# Patient Record
Sex: FEMALE | Race: WHITE | NOT HISPANIC OR LATINO | Employment: OTHER | URBAN - METROPOLITAN AREA
[De-identification: names, ages, dates, MRNs, and addresses within clinical notes are randomized per-mention and may not be internally consistent; named-entity substitution may affect disease eponyms.]

---

## 2017-04-18 ENCOUNTER — TELEPHONE (OUTPATIENT)
Dept: ORTHOPEDIC SURGERY | Facility: CLINIC | Age: 57
End: 2017-04-18

## 2017-05-24 ENCOUNTER — OFFICE VISIT (OUTPATIENT)
Dept: ORTHOPEDIC SURGERY | Facility: CLINIC | Age: 57
End: 2017-05-24

## 2017-05-24 VITALS — HEIGHT: 63 IN | TEMPERATURE: 97.6 F | WEIGHT: 190 LBS | BODY MASS INDEX: 33.66 KG/M2

## 2017-05-24 DIAGNOSIS — M25.512 ACUTE PAIN OF LEFT SHOULDER: ICD-10-CM

## 2017-05-24 DIAGNOSIS — Z96.651 HISTORY OF TOTAL KNEE ARTHROPLASTY, RIGHT: Primary | ICD-10-CM

## 2017-05-24 PROBLEM — Z96.659 HISTORY OF TOTAL KNEE ARTHROPLASTY: Status: ACTIVE | Noted: 2017-05-24

## 2017-05-24 PROCEDURE — 99212 OFFICE O/P EST SF 10 MIN: CPT | Performed by: ORTHOPAEDIC SURGERY

## 2017-05-24 PROCEDURE — 73560 X-RAY EXAM OF KNEE 1 OR 2: CPT | Performed by: ORTHOPAEDIC SURGERY

## 2017-08-10 ENCOUNTER — HOSPITAL ENCOUNTER (OUTPATIENT)
Dept: FAMILY MEDICINE CLINIC | Facility: CLINIC | Age: 57
Setting detail: SPECIMEN
Discharge: HOME | End: 2017-08-10
Attending: FAMILY MEDICINE | Admitting: FAMILY MEDICINE

## 2017-08-10 LAB
ALBUMIN SERPL-MCNC: 4.1 G/DL (ref 3.5–4.8)
ALBUMIN/GLOB SERPL: 1.5 {RATIO} (ref 1–1.7)
ALP SERPL-CCNC: 64 IU/L (ref 32–91)
ALT SERPL-CCNC: 17 IU/L (ref 14–54)
ANION GAP SERPL CALC-SCNC: 13.1 MMOL/L (ref 10–20)
AST SERPL-CCNC: 19 IU/L (ref 15–41)
BASOPHILS # BLD AUTO: 0 10*3/UL (ref 0–0.2)
BASOPHILS NFR BLD AUTO: 0 % (ref 0–2)
BILIRUB SERPL-MCNC: 0.8 MG/DL (ref 0.3–1.2)
BUN SERPL-MCNC: 13 MG/DL (ref 8–20)
BUN/CREAT SERPL: 13 (ref 5.4–26.2)
CALCIUM SERPL-MCNC: 9.2 MG/DL (ref 8.9–10.3)
CHLORIDE SERPL-SCNC: 102 MMOL/L (ref 101–111)
CHOLEST SERPL-MCNC: 212 MG/DL
CHOLEST/HDLC SERPL: 3.5 {RATIO}
CONV CO2: 31 MMOL/L (ref 22–32)
CONV LDL CHOLESTEROL DIRECT: 137 MG/DL (ref 0–100)
CONV TOTAL PROTEIN: 6.8 G/DL (ref 6.1–7.9)
CREAT UR-MCNC: 1 MG/DL (ref 0.4–1)
DIFFERENTIAL METHOD BLD: (no result)
EOSINOPHIL # BLD AUTO: 0.2 10*3/UL (ref 0–0.3)
EOSINOPHIL # BLD AUTO: 3 % (ref 0–3)
ERYTHROCYTE [DISTWIDTH] IN BLOOD BY AUTOMATED COUNT: 14 % (ref 11.5–14.5)
GLOBULIN UR ELPH-MCNC: 2.7 G/DL (ref 2.5–3.8)
GLUCOSE SERPL-MCNC: 89 MG/DL (ref 65–99)
HCT VFR BLD AUTO: 40.8 % (ref 35–49)
HDLC SERPL-MCNC: 61 MG/DL
HGB BLD-MCNC: 13.6 G/DL (ref 12–15)
LDLC/HDLC SERPL: 2.2 {RATIO}
LIPID INTERPRETATION: ABNORMAL
LYMPHOCYTES # BLD AUTO: 1.3 10*3/UL (ref 0.8–4.8)
LYMPHOCYTES NFR BLD AUTO: 24 % (ref 18–42)
MCH RBC QN AUTO: 30.8 PG (ref 26–32)
MCHC RBC AUTO-ENTMCNC: 33.4 G/DL (ref 32–36)
MCV RBC AUTO: 92.4 FL (ref 80–94)
MONOCYTES # BLD AUTO: 0.5 10*3/UL (ref 0.1–1.3)
MONOCYTES NFR BLD AUTO: 8 % (ref 2–11)
NEUTROPHILS # BLD AUTO: 3.5 10*3/UL (ref 2.3–8.6)
NEUTROPHILS NFR BLD AUTO: 65 % (ref 50–75)
NRBC BLD AUTO-RTO: 0 /100{WBCS}
NRBC/RBC NFR BLD MANUAL: 0 10*3/UL
PLATELET # BLD AUTO: 273 10*3/UL (ref 150–450)
PMV BLD AUTO: 7.8 FL (ref 7.4–10.4)
POTASSIUM SERPL-SCNC: 4.1 MMOL/L (ref 3.6–5.1)
RBC # BLD AUTO: 4.42 10*6/UL (ref 4–5.4)
SODIUM SERPL-SCNC: 142 MMOL/L (ref 136–144)
TRIGL SERPL-MCNC: 150 MG/DL
URATE SERPL-MCNC: 6.3 MG/DL (ref 2.6–8)
VLDLC SERPL CALC-MCNC: 13.3 MG/DL
WBC # BLD AUTO: 5.5 10*3/UL (ref 4.5–11.5)

## 2017-10-18 ENCOUNTER — TELEPHONE (OUTPATIENT)
Dept: ORTHOPEDIC SURGERY | Facility: CLINIC | Age: 57
End: 2017-10-18

## 2017-10-18 RX ORDER — AMOXICILLIN 500 MG/1
CAPSULE ORAL
Qty: 20 CAPSULE | Refills: 1 | Status: SHIPPED | OUTPATIENT
Start: 2017-10-18 | End: 2020-04-15

## 2018-02-07 ENCOUNTER — HOSPITAL ENCOUNTER (OUTPATIENT)
Dept: FAMILY MEDICINE CLINIC | Facility: CLINIC | Age: 58
Setting detail: SPECIMEN
Discharge: HOME | End: 2018-02-07
Attending: FAMILY MEDICINE | Admitting: FAMILY MEDICINE

## 2018-02-07 LAB
ALBUMIN SERPL-MCNC: 4 G/DL (ref 3.5–4.8)
ALBUMIN/GLOB SERPL: 1.2 {RATIO} (ref 1–1.7)
ALP SERPL-CCNC: 67 IU/L (ref 32–91)
ALT SERPL-CCNC: 18 IU/L (ref 14–54)
ANION GAP SERPL CALC-SCNC: 12.8 MMOL/L (ref 10–20)
AST SERPL-CCNC: 19 IU/L (ref 15–41)
BASOPHILS # BLD AUTO: 0 10*3/UL (ref 0–0.2)
BASOPHILS NFR BLD AUTO: 0 % (ref 0–2)
BILIRUB SERPL-MCNC: 0.8 MG/DL (ref 0.3–1.2)
BUN SERPL-MCNC: 16 MG/DL (ref 8–20)
BUN/CREAT SERPL: 16 (ref 5.4–26.2)
CALCIUM SERPL-MCNC: 9.3 MG/DL (ref 8.9–10.3)
CHLORIDE SERPL-SCNC: 100 MMOL/L (ref 101–111)
CHOLEST SERPL-MCNC: 228 MG/DL
CHOLEST/HDLC SERPL: 3.7 {RATIO}
CONV CO2: 30 MMOL/L (ref 22–32)
CONV LDL CHOLESTEROL DIRECT: 152 MG/DL (ref 0–100)
CONV TOTAL PROTEIN: 7.3 G/DL (ref 6.1–7.9)
CREAT UR-MCNC: 1 MG/DL (ref 0.4–1)
DIFFERENTIAL METHOD BLD: (no result)
EOSINOPHIL # BLD AUTO: 0.2 10*3/UL (ref 0–0.3)
EOSINOPHIL # BLD AUTO: 3 % (ref 0–3)
ERYTHROCYTE [DISTWIDTH] IN BLOOD BY AUTOMATED COUNT: 13.9 % (ref 11.5–14.5)
GLOBULIN UR ELPH-MCNC: 3.3 G/DL (ref 2.5–3.8)
GLUCOSE SERPL-MCNC: 93 MG/DL (ref 65–99)
HCT VFR BLD AUTO: 41.3 % (ref 35–49)
HDLC SERPL-MCNC: 62 MG/DL
HGB BLD-MCNC: 13.8 G/DL (ref 12–15)
LDLC/HDLC SERPL: 2.5 {RATIO}
LIPID INTERPRETATION: ABNORMAL
LYMPHOCYTES # BLD AUTO: 1.5 10*3/UL (ref 0.8–4.8)
LYMPHOCYTES NFR BLD AUTO: 30 % (ref 18–42)
MCH RBC QN AUTO: 30.9 PG (ref 26–32)
MCHC RBC AUTO-ENTMCNC: 33.4 G/DL (ref 32–36)
MCV RBC AUTO: 92.7 FL (ref 80–94)
MONOCYTES # BLD AUTO: 0.4 10*3/UL (ref 0.1–1.3)
MONOCYTES NFR BLD AUTO: 8 % (ref 2–11)
NEUTROPHILS # BLD AUTO: 2.9 10*3/UL (ref 2.3–8.6)
NEUTROPHILS NFR BLD AUTO: 59 % (ref 50–75)
NRBC BLD AUTO-RTO: 0 /100{WBCS}
NRBC/RBC NFR BLD MANUAL: 0 10*3/UL
PLATELET # BLD AUTO: 297 10*3/UL (ref 150–450)
PMV BLD AUTO: 7.8 FL (ref 7.4–10.4)
POTASSIUM SERPL-SCNC: 3.8 MMOL/L (ref 3.6–5.1)
RBC # BLD AUTO: 4.46 10*6/UL (ref 4–5.4)
SODIUM SERPL-SCNC: 139 MMOL/L (ref 136–144)
TRIGL SERPL-MCNC: 136 MG/DL
VLDLC SERPL CALC-MCNC: 13.8 MG/DL
WBC # BLD AUTO: 4.9 10*3/UL (ref 4.5–11.5)

## 2018-08-09 ENCOUNTER — HOSPITAL ENCOUNTER (OUTPATIENT)
Dept: MAMMOGRAPHY | Facility: HOSPITAL | Age: 58
Discharge: HOME | End: 2018-08-09
Attending: FAMILY MEDICINE | Admitting: FAMILY MEDICINE

## 2019-02-05 ENCOUNTER — HOSPITAL ENCOUNTER (OUTPATIENT)
Dept: FAMILY MEDICINE CLINIC | Facility: CLINIC | Age: 59
Setting detail: SPECIMEN
Discharge: HOME | End: 2019-02-05
Attending: FAMILY MEDICINE | Admitting: FAMILY MEDICINE

## 2019-02-05 LAB
ALBUMIN SERPL-MCNC: 4 G/DL (ref 3.5–4.8)
ALBUMIN/GLOB SERPL: 1.2 {RATIO} (ref 1–1.7)
ALP SERPL-CCNC: 68 IU/L (ref 32–91)
ALT SERPL-CCNC: 16 IU/L (ref 14–54)
ANION GAP SERPL CALC-SCNC: 12.3 MMOL/L (ref 10–20)
AST SERPL-CCNC: 19 IU/L (ref 15–41)
BILIRUB SERPL-MCNC: 0.5 MG/DL (ref 0.3–1.2)
BUN SERPL-MCNC: 12 MG/DL (ref 8–20)
BUN/CREAT SERPL: 13.3 (ref 5.4–26.2)
CALCIUM SERPL-MCNC: 9.3 MG/DL (ref 8.9–10.3)
CHLORIDE SERPL-SCNC: 100 MMOL/L (ref 101–111)
CHOLEST SERPL-MCNC: 233 MG/DL
CHOLEST/HDLC SERPL: 3.5 {RATIO}
CONV CO2: 30 MMOL/L (ref 22–32)
CONV LDL CHOLESTEROL DIRECT: 149 MG/DL (ref 0–100)
CONV TOTAL PROTEIN: 7.3 G/DL (ref 6.1–7.9)
CREAT UR-MCNC: 0.9 MG/DL (ref 0.4–1)
GLOBULIN UR ELPH-MCNC: 3.3 G/DL (ref 2.5–3.8)
GLUCOSE SERPL-MCNC: 86 MG/DL (ref 65–99)
HDLC SERPL-MCNC: 66 MG/DL
LDLC/HDLC SERPL: 2.3 {RATIO}
LIPID INTERPRETATION: ABNORMAL
POTASSIUM SERPL-SCNC: 3.3 MMOL/L (ref 3.6–5.1)
SODIUM SERPL-SCNC: 139 MMOL/L (ref 136–144)
TRIGL SERPL-MCNC: 134 MG/DL
VLDLC SERPL CALC-MCNC: 18.2 MG/DL

## 2019-10-04 RX ORDER — PRAVASTATIN SODIUM 80 MG/1
TABLET ORAL
Qty: 90 TABLET | Refills: 0 | Status: SHIPPED | OUTPATIENT
Start: 2019-10-04 | End: 2019-12-23

## 2019-10-04 RX ORDER — POTASSIUM CHLORIDE 750 MG/1
CAPSULE, EXTENDED RELEASE ORAL
Qty: 90 CAPSULE | Refills: 0 | Status: SHIPPED | OUTPATIENT
Start: 2019-10-04 | End: 2020-02-23

## 2019-10-04 RX ORDER — ACYCLOVIR 800 MG/1
TABLET ORAL
Qty: 90 TABLET | Refills: 0 | Status: SHIPPED | OUTPATIENT
Start: 2019-10-04 | End: 2019-12-23

## 2019-12-23 RX ORDER — PRAVASTATIN SODIUM 80 MG/1
TABLET ORAL
Qty: 30 TABLET | Refills: 0 | Status: SHIPPED | OUTPATIENT
Start: 2019-12-23 | End: 2020-01-23

## 2019-12-23 RX ORDER — ACYCLOVIR 800 MG/1
TABLET ORAL
Qty: 30 TABLET | Refills: 0 | Status: SHIPPED | OUTPATIENT
Start: 2019-12-23 | End: 2020-01-23

## 2020-01-23 RX ORDER — PRAVASTATIN SODIUM 80 MG/1
80 TABLET ORAL
Qty: 30 TABLET | Refills: 0 | Status: SHIPPED | OUTPATIENT
Start: 2020-01-23 | End: 2020-02-23

## 2020-01-23 RX ORDER — METOPROLOL SUCCINATE 50 MG/1
50 TABLET, EXTENDED RELEASE ORAL DAILY
Qty: 30 TABLET | Refills: 0 | Status: SHIPPED | OUTPATIENT
Start: 2020-01-23 | End: 2020-02-23

## 2020-01-23 RX ORDER — ACYCLOVIR 800 MG/1
800 TABLET ORAL DAILY
Qty: 30 TABLET | Refills: 0 | Status: SHIPPED | OUTPATIENT
Start: 2020-01-23 | End: 2020-02-23

## 2020-02-23 RX ORDER — METOPROLOL SUCCINATE 50 MG/1
TABLET, EXTENDED RELEASE ORAL
Qty: 30 TABLET | Refills: 0 | Status: SHIPPED | OUTPATIENT
Start: 2020-02-23 | End: 2020-04-07 | Stop reason: SDUPTHER

## 2020-02-23 RX ORDER — POTASSIUM CHLORIDE 750 MG/1
CAPSULE, EXTENDED RELEASE ORAL
Qty: 90 CAPSULE | Refills: 0 | Status: SHIPPED | OUTPATIENT
Start: 2020-02-23 | End: 2020-04-07 | Stop reason: SDUPTHER

## 2020-02-23 RX ORDER — CETIRIZINE HYDROCHLORIDE 10 MG/1
TABLET ORAL
Qty: 90 TABLET | Refills: 0 | Status: SHIPPED | OUTPATIENT
Start: 2020-02-23 | End: 2020-04-07 | Stop reason: SDUPTHER

## 2020-02-23 RX ORDER — TRIAMTERENE AND HYDROCHLOROTHIAZIDE 75; 50 MG/1; MG/1
TABLET ORAL
Qty: 90 TABLET | Refills: 0 | Status: SHIPPED | OUTPATIENT
Start: 2020-02-23 | End: 2020-04-07 | Stop reason: SDUPTHER

## 2020-02-23 RX ORDER — ERGOCALCIFEROL 1.25 MG/1
CAPSULE ORAL
Qty: 12 CAPSULE | Refills: 0 | Status: SHIPPED | OUTPATIENT
Start: 2020-02-23 | End: 2020-04-06 | Stop reason: SDUPTHER

## 2020-02-23 RX ORDER — ACYCLOVIR 800 MG/1
TABLET ORAL
Qty: 30 TABLET | Refills: 0 | Status: SHIPPED | OUTPATIENT
Start: 2020-02-23 | End: 2020-04-07 | Stop reason: SDUPTHER

## 2020-02-23 RX ORDER — PRAVASTATIN SODIUM 80 MG/1
TABLET ORAL
Qty: 90 TABLET | Refills: 0 | Status: SHIPPED | OUTPATIENT
Start: 2020-02-23 | End: 2020-04-07 | Stop reason: SDUPTHER

## 2020-04-06 RX ORDER — ERGOCALCIFEROL 1.25 MG/1
50000 CAPSULE ORAL WEEKLY
Qty: 12 CAPSULE | Refills: 0 | Status: SHIPPED | OUTPATIENT
Start: 2020-04-06 | End: 2021-02-09

## 2020-04-07 RX ORDER — POTASSIUM CHLORIDE 750 MG/1
10 CAPSULE, EXTENDED RELEASE ORAL DAILY
Qty: 90 CAPSULE | Refills: 0 | Status: SHIPPED | OUTPATIENT
Start: 2020-04-07 | End: 2021-03-09

## 2020-04-07 RX ORDER — METOPROLOL SUCCINATE 50 MG/1
50 TABLET, EXTENDED RELEASE ORAL DAILY
Qty: 30 TABLET | Refills: 0 | Status: SHIPPED | OUTPATIENT
Start: 2020-04-07 | End: 2021-03-11

## 2020-04-07 RX ORDER — ACYCLOVIR 800 MG/1
400 TABLET ORAL DAILY
Qty: 90 TABLET | Refills: 0 | Status: SHIPPED | OUTPATIENT
Start: 2020-04-07 | End: 2021-07-06

## 2020-04-07 RX ORDER — CETIRIZINE HYDROCHLORIDE 10 MG/1
10 TABLET ORAL DAILY
Qty: 90 TABLET | Refills: 0 | Status: SHIPPED | OUTPATIENT
Start: 2020-04-07 | End: 2021-06-09

## 2020-04-07 RX ORDER — PRAVASTATIN SODIUM 80 MG/1
80 TABLET ORAL
Qty: 90 TABLET | Refills: 0 | Status: SHIPPED | OUTPATIENT
Start: 2020-04-07 | End: 2021-12-09

## 2020-04-07 RX ORDER — TRIAMTERENE AND HYDROCHLOROTHIAZIDE 75; 50 MG/1; MG/1
1 TABLET ORAL DAILY
Qty: 90 TABLET | Refills: 0 | Status: SHIPPED | OUTPATIENT
Start: 2020-04-07 | End: 2021-07-29

## 2020-04-15 ENCOUNTER — OFFICE VISIT (OUTPATIENT)
Dept: FAMILY MEDICINE CLINIC | Facility: CLINIC | Age: 60
End: 2020-04-15

## 2020-04-15 VITALS
SYSTOLIC BLOOD PRESSURE: 141 MMHG | DIASTOLIC BLOOD PRESSURE: 80 MMHG | OXYGEN SATURATION: 98 % | WEIGHT: 201 LBS | TEMPERATURE: 98.2 F | HEART RATE: 60 BPM | BODY MASS INDEX: 34.31 KG/M2 | HEIGHT: 64 IN

## 2020-04-15 DIAGNOSIS — D64.9 ANEMIA, UNSPECIFIED TYPE: ICD-10-CM

## 2020-04-15 DIAGNOSIS — E78.2 MIXED HYPERLIPIDEMIA: Primary | ICD-10-CM

## 2020-04-15 DIAGNOSIS — L20.84 INTRINSIC ECZEMA: ICD-10-CM

## 2020-04-15 DIAGNOSIS — E55.9 VITAMIN D DEFICIENCY: ICD-10-CM

## 2020-04-15 DIAGNOSIS — I10 ESSENTIAL HYPERTENSION: ICD-10-CM

## 2020-04-15 PROBLEM — J30.9 ALLERGIC RHINITIS: Status: ACTIVE | Noted: 2018-02-07

## 2020-04-15 PROBLEM — N62 BREAST HYPERTROPHY: Status: ACTIVE | Noted: 2019-02-05

## 2020-04-15 PROBLEM — E78.5 HYPERLIPIDEMIA: Status: ACTIVE | Noted: 2017-08-10

## 2020-04-15 PROCEDURE — 99213 OFFICE O/P EST LOW 20 MIN: CPT | Performed by: FAMILY MEDICINE

## 2020-04-15 RX ORDER — CLOBETASOL PROPIONATE 0.5 MG/G
CREAM TOPICAL 2 TIMES DAILY
Qty: 30 G | Refills: 1 | Status: SHIPPED | OUTPATIENT
Start: 2020-04-15

## 2020-04-15 NOTE — PROGRESS NOTES
Subjective   Sarah Brannon is a 60 y.o. female.     Comes in for follow up on bp, chol and vit D def  C/O rash on her scalp  Has always used a medicated shampoo but it is getting worse  She has been using her sister's cream and it is working - clobetasol propionate 0.05%         The following portions of the patient's history were reviewed and updated as appropriate: allergies, current medications, past family history, past medical history, past social history, past surgical history and problem list.  Past Medical History:   Diagnosis Date   • Arthritis    • Baker's cyst of knee     RIGHT   • Depression    • Hiatal hernia    • History of palpitations    • Hyperlipemia    • Hypertension    • Migraine    • PONV (postoperative nausea and vomiting)     SLOW TO WAKE   • Seasonal allergies      Past Surgical History:   Procedure Laterality Date   • ENDOMETRIAL ABLATION      menustrual   • FOOT SURGERY      rt and lt foot   • LEEP     • MOLE REMOVAL     • NASAL SEPTUM SURGERY     • TOTAL KNEE ARTHROPLASTY Right 8/23/2016    Procedure: RT TOTAL KNEE ARTHROPLASTY WITH ROBERTO NAVIGATION, depuy;  Surgeon: Hakan Mccann MD;  Location: [facility];  Service:      Family History   Problem Relation Age of Onset   • Heart disease Mother    • Heart disease Father      Social History     Socioeconomic History   • Marital status:      Spouse name: Not on file   • Number of children: Not on file   • Years of education: Not on file   • Highest education level: Not on file   Tobacco Use   • Smoking status: Never Smoker   • Smokeless tobacco: Never Used   Substance and Sexual Activity   • Alcohol use: No   • Drug use: No   • Sexual activity: Defer         Current Outpatient Medications:   •  acyclovir (ZOVIRAX) 800 MG tablet, Take 0.5 tablets by mouth Daily., Disp: 90 tablet, Rfl: 0  •  buPROPion XL (WELLBUTRIN XL) 150 MG 24 hr tablet, Take 150 mg by mouth every morning., Disp: , Rfl:   •  buPROPion XL (WELLBUTRIN XL)  "300 MG 24 hr tablet, Take 300 mg by mouth every morning., Disp: , Rfl:   •  cetirizine (zyrTEC) 10 MG tablet, Take 1 tablet by mouth Daily., Disp: 90 tablet, Rfl: 0  •  metoprolol succinate XL (TOPROL-XL) 50 MG 24 hr tablet, Take 1 tablet by mouth Daily., Disp: 30 tablet, Rfl: 0  •  potassium chloride (MICRO-K) 10 MEQ CR capsule, Take 1 capsule by mouth Daily., Disp: 90 capsule, Rfl: 0  •  pravastatin (PRAVACHOL) 80 MG tablet, Take 1 tablet by mouth every night at bedtime., Disp: 90 tablet, Rfl: 0  •  triamterene-hydrochlorothiazide (MAXZIDE) 75-50 MG per tablet, Take 1 tablet by mouth Daily., Disp: 90 tablet, Rfl: 0  •  venlafaxine XR (EFFEXOR-XR) 150 MG 24 hr capsule, Take 300 mg by mouth daily., Disp: , Rfl:   •  vitamin D (ERGOCALCIFEROL) 1.25 MG (03567 UT) capsule capsule, Take 1 capsule by mouth 1 (One) Time Per Week., Disp: 12 capsule, Rfl: 0  •  clobetasol (TEMOVATE) 0.05 % cream, Apply  topically to the appropriate area as directed 2 (Two) Times a Day., Disp: 30 g, Rfl: 1    Review of Systems   Constitutional: Negative for diaphoresis, fatigue, fever, unexpected weight gain and unexpected weight loss.   Respiratory: Negative for cough, chest tightness and shortness of breath.    Cardiovascular: Negative for chest pain, palpitations and leg swelling.   Gastrointestinal: Negative for nausea and vomiting.   Skin: Positive for rash.   Neurological: Negative for dizziness, syncope and headache.   Psychiatric/Behavioral: Positive for stress. Negative for self-injury and suicidal ideas. The patient is nervous/anxious.      /80 (BP Location: Left arm, Patient Position: Sitting, Cuff Size: Adult)   Pulse 60   Temp 98.2 °F (36.8 °C) (Oral)   Ht 161.3 cm (63.5\")   Wt 91.2 kg (201 lb)   SpO2 98%   Breastfeeding No   BMI 35.05 kg/m²       Objective   Physical Exam   Constitutional: She appears well-developed and well-nourished. No distress. She is obese.  HENT:   Head: Normocephalic and atraumatic.   Neck: " Neck supple. No JVD present. No thyromegaly present.   Cardiovascular: Normal rate, regular rhythm, normal heart sounds and intact distal pulses. Exam reveals no gallop and no friction rub.   No murmur heard.  Pulmonary/Chest: Effort normal and breath sounds normal. No respiratory distress. She has no wheezes. She has no rales.   Musculoskeletal: She exhibits no edema.   Lymphadenopathy:     She has no cervical adenopathy.   Neurological: She is alert.   Skin: Skin is warm and dry.   Some scaling along the hairline   Nursing note and vitals reviewed.        Assessment/Plan   Problems Addressed this Visit        Cardiovascular and Mediastinum    Hyperlipidemia - Primary    Relevant Orders    Comprehensive Metabolic Panel    Lipid Panel    Hypertension    Relevant Orders    Comprehensive Metabolic Panel    Lipid Panel       Digestive    Vitamin D deficiency    Relevant Orders    Vitamin D 25 Hydroxy       Musculoskeletal and Integument    Intrinsic eczema    Relevant Medications    clobetasol (TEMOVATE) 0.05 % cream       Hematopoietic and Hemostatic    Anemia    Relevant Orders    CBC & Differential        She was counseled on the need for weight loss  She will continue her current medications  Gave her a prescription for some clobetasol propionate to help with the eczema  This may be some mild psoriasis  She was encouraged to work on stress reduction to help deal with the current pandemic  She did let me know that she has plans to have a breast reduction later this year once elective surgeries are allowed to restart

## 2020-04-16 PROBLEM — E55.9 VITAMIN D DEFICIENCY: Status: ACTIVE | Noted: 2018-02-11

## 2020-04-16 PROBLEM — N64.4 MASTODYNIA: Status: ACTIVE | Noted: 2018-08-07

## 2020-04-16 PROBLEM — I10 HYPERTENSION: Status: ACTIVE | Noted: 2017-08-10

## 2020-04-16 PROBLEM — L20.84 INTRINSIC ECZEMA: Status: ACTIVE | Noted: 2020-04-16

## 2020-04-16 PROBLEM — N62 MACROMASTIA: Status: ACTIVE | Noted: 2019-02-05

## 2020-06-08 ENCOUNTER — TRANSCRIBE ORDERS (OUTPATIENT)
Dept: ADMINISTRATIVE | Facility: HOSPITAL | Age: 60
End: 2020-06-08

## 2020-06-08 ENCOUNTER — HOSPITAL ENCOUNTER (OUTPATIENT)
Dept: CARDIOLOGY | Facility: HOSPITAL | Age: 60
Discharge: HOME | End: 2020-06-08
Admitting: SURGERY

## 2020-06-08 ENCOUNTER — LAB (OUTPATIENT)
Dept: LAB | Facility: HOSPITAL | Age: 60
End: 2020-06-08

## 2020-06-08 DIAGNOSIS — E87.8 ELECTROLYTE IMBALANCE: ICD-10-CM

## 2020-06-08 DIAGNOSIS — D64.9 ANEMIA, UNSPECIFIED TYPE: ICD-10-CM

## 2020-06-08 DIAGNOSIS — Z01.810 PRE-OPERATIVE CARDIOVASCULAR EXAMINATION: ICD-10-CM

## 2020-06-08 DIAGNOSIS — D64.9 ANEMIA, UNSPECIFIED TYPE: Primary | ICD-10-CM

## 2020-06-08 LAB
BASOPHILS # BLD AUTO: 0.04 10*3/MM3 (ref 0–0.2)
BASOPHILS NFR BLD AUTO: 0.5 % (ref 0–1.5)
DEPRECATED RDW RBC AUTO: 46.9 FL (ref 37–54)
EOSINOPHIL # BLD AUTO: 0.31 10*3/MM3 (ref 0–0.4)
EOSINOPHIL NFR BLD AUTO: 3.7 % (ref 0.3–6.2)
ERYTHROCYTE [DISTWIDTH] IN BLOOD BY AUTOMATED COUNT: 13.6 % (ref 12.3–15.4)
HCT VFR BLD AUTO: 38.5 % (ref 34–46.6)
HGB BLD-MCNC: 13.1 G/DL (ref 12–15.9)
IMM GRANULOCYTES # BLD AUTO: 0.02 10*3/MM3 (ref 0–0.05)
IMM GRANULOCYTES NFR BLD AUTO: 0.2 % (ref 0–0.5)
LYMPHOCYTES # BLD AUTO: 2.18 10*3/MM3 (ref 0.7–3.1)
LYMPHOCYTES NFR BLD AUTO: 26 % (ref 19.6–45.3)
MCH RBC QN AUTO: 31.6 PG (ref 26.6–33)
MCHC RBC AUTO-ENTMCNC: 34 G/DL (ref 31.5–35.7)
MCV RBC AUTO: 93 FL (ref 79–97)
MONOCYTES # BLD AUTO: 0.7 10*3/MM3 (ref 0.1–0.9)
MONOCYTES NFR BLD AUTO: 8.4 % (ref 5–12)
NEUTROPHILS # BLD AUTO: 5.12 10*3/MM3 (ref 1.7–7)
NEUTROPHILS NFR BLD AUTO: 61.2 % (ref 42.7–76)
NRBC BLD AUTO-RTO: 0 /100 WBC (ref 0–0.2)
PLATELET # BLD AUTO: 294 10*3/MM3 (ref 140–450)
PMV BLD AUTO: 9.4 FL (ref 6–12)
POTASSIUM BLD-SCNC: 3.6 MMOL/L (ref 3.5–5.2)
RBC # BLD AUTO: 4.14 10*6/MM3 (ref 3.77–5.28)
WBC NRBC COR # BLD: 8.37 10*3/MM3 (ref 3.4–10.8)

## 2020-06-08 PROCEDURE — 93005 ELECTROCARDIOGRAM TRACING: CPT | Performed by: SURGERY

## 2020-06-08 PROCEDURE — 36415 COLL VENOUS BLD VENIPUNCTURE: CPT

## 2020-06-08 PROCEDURE — 84132 ASSAY OF SERUM POTASSIUM: CPT

## 2020-06-08 PROCEDURE — 85025 COMPLETE CBC W/AUTO DIFF WBC: CPT

## 2020-06-09 PROCEDURE — 93010 ELECTROCARDIOGRAM REPORT: CPT | Performed by: INTERNAL MEDICINE

## 2020-06-16 ENCOUNTER — LAB REQUISITION (OUTPATIENT)
Dept: LAB | Facility: HOSPITAL | Age: 60
End: 2020-06-16

## 2020-06-16 DIAGNOSIS — Z00.00 ENCOUNTER FOR GENERAL ADULT MEDICAL EXAMINATION WITHOUT ABNORMAL FINDINGS: ICD-10-CM

## 2020-06-16 PROCEDURE — 88305 TISSUE EXAM BY PATHOLOGIST: CPT | Performed by: SURGERY

## 2020-06-17 LAB
CYTO UR: NORMAL
LAB AP CASE REPORT: NORMAL
LAB AP CLINICAL INFORMATION: NORMAL
PATH REPORT.FINAL DX SPEC: NORMAL
PATH REPORT.GROSS SPEC: NORMAL

## 2021-02-09 RX ORDER — ERGOCALCIFEROL 1.25 MG/1
CAPSULE ORAL
Qty: 12 CAPSULE | Refills: 0 | Status: SHIPPED | OUTPATIENT
Start: 2021-02-09 | End: 2021-08-09

## 2021-03-09 ENCOUNTER — LAB (OUTPATIENT)
Dept: FAMILY MEDICINE CLINIC | Facility: CLINIC | Age: 61
End: 2021-03-09

## 2021-03-09 ENCOUNTER — OFFICE VISIT (OUTPATIENT)
Dept: FAMILY MEDICINE CLINIC | Facility: CLINIC | Age: 61
End: 2021-03-09

## 2021-03-09 VITALS
WEIGHT: 198 LBS | BODY MASS INDEX: 33.8 KG/M2 | HEIGHT: 64 IN | HEART RATE: 60 BPM | OXYGEN SATURATION: 100 % | DIASTOLIC BLOOD PRESSURE: 85 MMHG | TEMPERATURE: 95.9 F | SYSTOLIC BLOOD PRESSURE: 142 MMHG

## 2021-03-09 DIAGNOSIS — M25.512 ACUTE PAIN OF LEFT SHOULDER: ICD-10-CM

## 2021-03-09 DIAGNOSIS — E55.9 VITAMIN D DEFICIENCY: ICD-10-CM

## 2021-03-09 DIAGNOSIS — Z11.59 NEED FOR HEPATITIS C SCREENING TEST: ICD-10-CM

## 2021-03-09 DIAGNOSIS — Z12.11 SCREENING FOR COLON CANCER: ICD-10-CM

## 2021-03-09 DIAGNOSIS — M54.50 LUMBAR BACK PAIN: ICD-10-CM

## 2021-03-09 DIAGNOSIS — E78.2 MIXED HYPERLIPIDEMIA: Primary | ICD-10-CM

## 2021-03-09 DIAGNOSIS — I10 ESSENTIAL HYPERTENSION: ICD-10-CM

## 2021-03-09 DIAGNOSIS — E78.2 MIXED HYPERLIPIDEMIA: ICD-10-CM

## 2021-03-09 LAB
25(OH)D3 SERPL-MCNC: 34.7 NG/ML (ref 30–100)
ALBUMIN SERPL-MCNC: 4.2 G/DL (ref 3.5–5.2)
ALBUMIN/GLOB SERPL: 1.4 G/DL
ALP SERPL-CCNC: 69 U/L (ref 39–117)
ALT SERPL W P-5'-P-CCNC: 16 U/L (ref 1–33)
ANION GAP SERPL CALCULATED.3IONS-SCNC: 8.6 MMOL/L (ref 5–15)
AST SERPL-CCNC: 20 U/L (ref 1–32)
BILIRUB SERPL-MCNC: 0.3 MG/DL (ref 0–1.2)
BUN SERPL-MCNC: 15 MG/DL (ref 8–23)
BUN/CREAT SERPL: 15.8 (ref 7–25)
CALCIUM SPEC-SCNC: 9.1 MG/DL (ref 8.6–10.5)
CHLORIDE SERPL-SCNC: 99 MMOL/L (ref 98–107)
CHOLEST SERPL-MCNC: 221 MG/DL (ref 0–200)
CO2 SERPL-SCNC: 32.4 MMOL/L (ref 22–29)
CREAT SERPL-MCNC: 0.95 MG/DL (ref 0.57–1)
GFR SERPL CREATININE-BSD FRML MDRD: 59 ML/MIN/1.73
GLOBULIN UR ELPH-MCNC: 3 GM/DL
GLUCOSE SERPL-MCNC: 79 MG/DL (ref 65–99)
HCV AB SER DONR QL: NORMAL
HDLC SERPL-MCNC: 55 MG/DL (ref 40–60)
LDLC SERPL CALC-MCNC: 129 MG/DL (ref 0–100)
LDLC/HDLC SERPL: 2.26 {RATIO}
POTASSIUM SERPL-SCNC: 3.8 MMOL/L (ref 3.5–5.2)
PROT SERPL-MCNC: 7.2 G/DL (ref 6–8.5)
SODIUM SERPL-SCNC: 140 MMOL/L (ref 136–145)
TRIGL SERPL-MCNC: 209 MG/DL (ref 0–150)
VLDLC SERPL-MCNC: 37 MG/DL (ref 5–40)

## 2021-03-09 PROCEDURE — 82306 VITAMIN D 25 HYDROXY: CPT | Performed by: FAMILY MEDICINE

## 2021-03-09 PROCEDURE — 80061 LIPID PANEL: CPT | Performed by: FAMILY MEDICINE

## 2021-03-09 PROCEDURE — 99214 OFFICE O/P EST MOD 30 MIN: CPT | Performed by: FAMILY MEDICINE

## 2021-03-09 PROCEDURE — 80053 COMPREHEN METABOLIC PANEL: CPT | Performed by: FAMILY MEDICINE

## 2021-03-09 PROCEDURE — 36415 COLL VENOUS BLD VENIPUNCTURE: CPT | Performed by: FAMILY MEDICINE

## 2021-03-09 PROCEDURE — 86803 HEPATITIS C AB TEST: CPT | Performed by: FAMILY MEDICINE

## 2021-03-09 RX ORDER — TIZANIDINE 4 MG/1
4 TABLET ORAL NIGHTLY PRN
Qty: 15 TABLET | Refills: 0 | Status: SHIPPED | OUTPATIENT
Start: 2021-03-09

## 2021-03-09 NOTE — PROGRESS NOTES
Subjective   Sarah Brannon is a 61 y.o. female.     Here for follow-up on blood pressure, cholesterol, and vitamin D  Low back pain yonas with standing  Sitting helps  Breast reduction in June 2020  Left shoulder pain for 3 weeks  Awoke with it feeling this way  Stiff as well  No change in routine or acute trauma  Pain has improved with ibuprofen       The following portions of the patient's history were reviewed and updated as appropriate: allergies, current medications, past family history, past medical history, past social history, past surgical history, and problem list.  Past Medical History:   Diagnosis Date   • Arthritis    • Baker's cyst of knee     RIGHT   • Depression    • Hiatal hernia    • History of breast augmentation 04/07/2020   • History of palpitations    • Hyperlipemia    • Hypertension    • Migraine    • PONV (postoperative nausea and vomiting)     SLOW TO WAKE   • Seasonal allergies      Past Surgical History:   Procedure Laterality Date   • BREAST AUGMENTATION Bilateral 06/2020   • ENDOMETRIAL ABLATION      menustrual   • FOOT SURGERY      rt and lt foot   • LEEP     • MOLE REMOVAL     • NASAL SEPTUM SURGERY     • TOTAL KNEE ARTHROPLASTY Right 8/23/2016    Procedure: RT TOTAL KNEE ARTHROPLASTY WITH ROBERTO NAVIGATION, depuy;  Surgeon: Hakan Mccann MD;  Location: [facility];  Service:      Family History   Problem Relation Age of Onset   • Heart disease Mother    • Heart disease Father      Social History     Socioeconomic History   • Marital status:      Spouse name: Not on file   • Number of children: Not on file   • Years of education: Not on file   • Highest education level: Not on file   Tobacco Use   • Smoking status: Never Smoker   • Smokeless tobacco: Never Used   Substance and Sexual Activity   • Alcohol use: No   • Drug use: No   • Sexual activity: Defer         Current Outpatient Medications:   •  acyclovir (ZOVIRAX) 800 MG tablet, Take 0.5 tablets by mouth Daily.,  "Disp: 90 tablet, Rfl: 0  •  buPROPion XL (WELLBUTRIN XL) 150 MG 24 hr tablet, Take 150 mg by mouth every morning., Disp: , Rfl:   •  buPROPion XL (WELLBUTRIN XL) 300 MG 24 hr tablet, Take 300 mg by mouth every morning., Disp: , Rfl:   •  cetirizine (zyrTEC) 10 MG tablet, Take 1 tablet by mouth Daily., Disp: 90 tablet, Rfl: 0  •  clobetasol (TEMOVATE) 0.05 % cream, Apply  topically to the appropriate area as directed 2 (Two) Times a Day., Disp: 30 g, Rfl: 1  •  metoprolol succinate XL (TOPROL-XL) 50 MG 24 hr tablet, Take 1 tablet by mouth Daily., Disp: 30 tablet, Rfl: 0  •  pravastatin (PRAVACHOL) 80 MG tablet, Take 1 tablet by mouth every night at bedtime., Disp: 90 tablet, Rfl: 0  •  triamterene-hydrochlorothiazide (MAXZIDE) 75-50 MG per tablet, Take 1 tablet by mouth Daily., Disp: 90 tablet, Rfl: 0  •  venlafaxine XR (EFFEXOR-XR) 150 MG 24 hr capsule, Take 300 mg by mouth daily., Disp: , Rfl:   •  vitamin D (ERGOCALCIFEROL) 1.25 MG (51386 UT) capsule capsule, TAKE 1 CAPSULE BY MOUTH ONE TIME A WEEK , Disp: 12 capsule, Rfl: 0  •  tiZANidine (ZANAFLEX) 4 MG tablet, Take 1 tablet by mouth At Night As Needed for Muscle Spasms., Disp: 15 tablet, Rfl: 0    Review of Systems   Constitutional: Negative for diaphoresis, fatigue, fever, unexpected weight gain and unexpected weight loss.   Respiratory: Negative for cough, chest tightness and shortness of breath.    Cardiovascular: Negative for chest pain, palpitations and leg swelling.   Gastrointestinal: Negative for nausea and vomiting.   Musculoskeletal: Positive for arthralgias and back pain.   Neurological: Negative for dizziness, syncope and headache.     /85 (BP Location: Right arm, Patient Position: Sitting, Cuff Size: Adult)   Pulse 60   Temp 95.9 °F (35.5 °C) (Temporal)   Ht 161.3 cm (63.5\")   Wt 89.8 kg (198 lb)   SpO2 100%   Breastfeeding No   BMI 34.52 kg/m²       Objective   Physical Exam  Vitals and nursing note reviewed.   Constitutional:       " General: She is not in acute distress.     Appearance: She is well-developed. She is obese.   HENT:      Head: Normocephalic and atraumatic.   Neck:      Thyroid: No thyromegaly.   Cardiovascular:      Rate and Rhythm: Normal rate and regular rhythm.      Heart sounds: Normal heart sounds. No murmur. No friction rub. No gallop.    Pulmonary:      Effort: Pulmonary effort is normal. No respiratory distress.      Breath sounds: Normal breath sounds. No wheezing or rales.   Musculoskeletal:      Left shoulder: Tenderness and bony tenderness present. No swelling or effusion. Decreased range of motion. Normal strength.      Cervical back: Neck supple.      Lumbar back: Normal.      Right lower leg: No edema.      Left lower leg: No edema.   Lymphadenopathy:      Cervical: No cervical adenopathy.   Skin:     General: Skin is warm and dry.   Neurological:      Mental Status: She is alert.   Psychiatric:         Mood and Affect: Mood normal.           Assessment/Plan   Problems Addressed this Visit        Cardiac and Vasculature    Hyperlipidemia - Primary    Relevant Orders    Comprehensive Metabolic Panel    Lipid Panel    Hypertension    Relevant Orders    Comprehensive Metabolic Panel    Lipid Panel       Endocrine and Metabolic    Vitamin D deficiency    Relevant Orders    Vitamin D 25 Hydroxy      Other Visit Diagnoses     Screening for colon cancer        Relevant Orders    Ambulatory Referral For Screening Colonoscopy    Lumbar back pain        Relevant Orders    XR Spine Lumbar 2 or 3 View    Acute pain of left shoulder        Relevant Orders    XR Shoulder 2+ View Left    Need for hepatitis C screening test        Relevant Orders    Hepatitis C Antibody      Diagnoses       Codes Comments    Mixed hyperlipidemia    -  Primary ICD-10-CM: E78.2  ICD-9-CM: 272.2     Essential hypertension     ICD-10-CM: I10  ICD-9-CM: 401.9     Vitamin D deficiency     ICD-10-CM: E55.9  ICD-9-CM: 268.9     Screening for colon cancer      ICD-10-CM: Z12.11  ICD-9-CM: V76.51     Lumbar back pain     ICD-10-CM: M54.5  ICD-9-CM: 724.2     Acute pain of left shoulder     ICD-10-CM: M25.512  ICD-9-CM: 719.41     Need for hepatitis C screening test     ICD-10-CM: Z11.59  ICD-9-CM: V73.89           Counseled on the need for weight loss  Labs ordered   X-rays of back and shoulder ordered  She will schedule a cpe with pap smear soon

## 2021-03-09 NOTE — PATIENT INSTRUCTIONS
Hot showers and warm compresses to neck and shoulders and low back    Keep working to lose weight through healthy eating and exercise.

## 2021-03-11 RX ORDER — METOPROLOL SUCCINATE 50 MG/1
TABLET, EXTENDED RELEASE ORAL
Qty: 90 TABLET | Refills: 3 | Status: SHIPPED | OUTPATIENT
Start: 2021-03-11 | End: 2022-03-14

## 2021-06-09 RX ORDER — CETIRIZINE HYDROCHLORIDE 10 MG/1
TABLET ORAL
Qty: 90 TABLET | Refills: 0 | Status: SHIPPED | OUTPATIENT
Start: 2021-06-09 | End: 2021-09-22

## 2021-07-06 RX ORDER — ACYCLOVIR 800 MG/1
TABLET ORAL
Qty: 90 TABLET | Refills: 2 | Status: ON HOLD | OUTPATIENT
Start: 2021-07-06 | End: 2022-06-07

## 2021-07-29 RX ORDER — TRIAMTERENE AND HYDROCHLOROTHIAZIDE 75; 50 MG/1; MG/1
TABLET ORAL
Qty: 90 TABLET | Refills: 0 | Status: SHIPPED | OUTPATIENT
Start: 2021-07-29 | End: 2021-12-09

## 2021-08-09 RX ORDER — ERGOCALCIFEROL 1.25 MG/1
CAPSULE ORAL
Qty: 12 CAPSULE | Refills: 1 | Status: SHIPPED | OUTPATIENT
Start: 2021-08-09 | End: 2021-09-20

## 2021-09-10 ENCOUNTER — OFFICE VISIT (OUTPATIENT)
Dept: FAMILY MEDICINE CLINIC | Facility: CLINIC | Age: 61
End: 2021-09-10

## 2021-09-10 VITALS
HEART RATE: 68 BPM | HEIGHT: 64 IN | TEMPERATURE: 98.6 F | DIASTOLIC BLOOD PRESSURE: 69 MMHG | OXYGEN SATURATION: 97 % | WEIGHT: 194 LBS | BODY MASS INDEX: 33.12 KG/M2 | SYSTOLIC BLOOD PRESSURE: 111 MMHG

## 2021-09-10 DIAGNOSIS — Z12.4 PAP SMEAR FOR CERVICAL CANCER SCREENING: ICD-10-CM

## 2021-09-10 DIAGNOSIS — Z12.11 SCREENING FOR COLON CANCER: ICD-10-CM

## 2021-09-10 DIAGNOSIS — Z23 NEED FOR VACCINATION: ICD-10-CM

## 2021-09-10 DIAGNOSIS — Z00.01 ENCOUNTER FOR GENERAL ADULT MEDICAL EXAMINATION WITH ABNORMAL FINDINGS: Primary | ICD-10-CM

## 2021-09-10 DIAGNOSIS — E66.9 OBESITY (BMI 30.0-34.9): ICD-10-CM

## 2021-09-10 PROCEDURE — 90715 TDAP VACCINE 7 YRS/> IM: CPT | Performed by: FAMILY MEDICINE

## 2021-09-10 PROCEDURE — 90471 IMMUNIZATION ADMIN: CPT | Performed by: FAMILY MEDICINE

## 2021-09-10 PROCEDURE — 82274 ASSAY TEST FOR BLOOD FECAL: CPT | Performed by: FAMILY MEDICINE

## 2021-09-10 PROCEDURE — 99396 PREV VISIT EST AGE 40-64: CPT | Performed by: FAMILY MEDICINE

## 2021-09-10 NOTE — PROGRESS NOTES
Subjective   Sarah Brannon is a 61 y.o. female.     Here for cpe/pap smear  Diarrhea for 2 weeks  Started BRAT diet  A month ago psych switched her from effexor to prozac but she switched back sec to worsening anxiety  H/o Prev colonoscopy  No sick household contacts  No blood in bowel movements  Refuses mammogram  Has appt with Dr. Dos Santos next week  Some dyspnea  Wakes up feeling like she gasping  Refuses sleep study  She has had her covid vaccines  She was having back and shoulder pain and finally had the x-rays I ordered earlier this year  These were reviewed with her        The following portions of the patient's history were reviewed and updated as appropriate: allergies, current medications, past family history, past medical history, past social history, past surgical history, and problem list.  Past Medical History:   Diagnosis Date   • Arthritis    • Baker's cyst of knee     RIGHT   • Depression    • Hiatal hernia    • History of breast augmentation 04/07/2020   • History of palpitations    • Hyperlipemia    • Hypertension    • Migraine    • PONV (postoperative nausea and vomiting)     SLOW TO WAKE   • Seasonal allergies      Past Surgical History:   Procedure Laterality Date   • BREAST AUGMENTATION Bilateral 06/2020   • ENDOMETRIAL ABLATION      menustrual   • FOOT SURGERY      rt and lt foot   • LEEP     • MOLE REMOVAL     • NASAL SEPTUM SURGERY     • TOTAL KNEE ARTHROPLASTY Right 8/23/2016    Procedure: RT TOTAL KNEE ARTHROPLASTY WITH ROBERTO NAVIGATION, depuy;  Surgeon: Hakan Mccann MD;  Location: [facility];  Service:      Family History   Problem Relation Age of Onset   • Heart disease Mother    • Heart disease Father      Social History     Socioeconomic History   • Marital status:      Spouse name: Not on file   • Number of children: Not on file   • Years of education: Not on file   • Highest education level: Not on file   Tobacco Use   • Smoking status: Never Smoker   •  Smokeless tobacco: Never Used   Vaping Use   • Vaping Use: Never used   Substance and Sexual Activity   • Alcohol use: No   • Drug use: No   • Sexual activity: Defer         Current Outpatient Medications:   •  acyclovir (ZOVIRAX) 800 MG tablet, TAKE 1/2 TABLET BY MOUTH ONE TIME A DAY , Disp: 90 tablet, Rfl: 2  •  buPROPion XL (WELLBUTRIN XL) 150 MG 24 hr tablet, Take 150 mg by mouth every morning., Disp: , Rfl:   •  buPROPion XL (WELLBUTRIN XL) 300 MG 24 hr tablet, Take 300 mg by mouth every morning., Disp: , Rfl:   •  cetirizine (zyrTEC) 10 MG tablet, 1 tab po qd, Disp: 90 tablet, Rfl: 0  •  clobetasol (TEMOVATE) 0.05 % cream, Apply  topically to the appropriate area as directed 2 (Two) Times a Day., Disp: 30 g, Rfl: 1  •  metoprolol succinate XL (TOPROL-XL) 50 MG 24 hr tablet, TAKE 1 TABLET BY MOUTH ONE TIME A DAY , Disp: 90 tablet, Rfl: 3  •  pravastatin (PRAVACHOL) 80 MG tablet, Take 1 tablet by mouth every night at bedtime., Disp: 90 tablet, Rfl: 0  •  tiZANidine (ZANAFLEX) 4 MG tablet, Take 1 tablet by mouth At Night As Needed for Muscle Spasms., Disp: 15 tablet, Rfl: 0  •  triamterene-hydrochlorothiazide (MAXZIDE) 75-50 MG per tablet, TAKE 1 TABLET BY MOUTH ONE TIME A DAY , Disp: 90 tablet, Rfl: 0  •  venlafaxine XR (EFFEXOR-XR) 150 MG 24 hr capsule, Take 300 mg by mouth daily., Disp: , Rfl:   •  vitamin D (ERGOCALCIFEROL) 1.25 MG (70244 UT) capsule capsule, TAKE 1 CAPSULE BY MOUTH ONE TIME A WEEK , Disp: 12 capsule, Rfl: 1    Review of Systems   Constitutional: Negative.    HENT: Negative.    Respiratory: Positive for shortness of breath. Negative for cough, choking, wheezing and stridor.    Cardiovascular: Negative.    Gastrointestinal: Negative.    Endocrine: Negative.    Genitourinary: Negative.    Musculoskeletal: Negative.    Skin: Negative.    Neurological: Negative.    Hematological: Negative.    Psychiatric/Behavioral: Negative.      /69 (BP Location: Left arm, Patient Position: Sitting, Cuff  "Size: Large Adult)   Pulse 68   Temp 98.6 °F (37 °C) (Temporal)   Ht 161.3 cm (63.5\")   Wt 88 kg (194 lb)   SpO2 97%   Breastfeeding No   BMI 33.83 kg/m²       Objective   Physical Exam  Vitals and nursing note reviewed. Exam conducted with a chaperone present.   Constitutional:       Appearance: Normal appearance. She is well-developed and well-groomed. She is obese.   HENT:      Head: Normocephalic and atraumatic.      Right Ear: Tympanic membrane, ear canal and external ear normal.      Left Ear: Tympanic membrane, ear canal and external ear normal.      Nose: Nose normal.      Mouth/Throat:      Mouth: Mucous membranes are moist.      Pharynx: Oropharynx is clear.   Eyes:      Extraocular Movements: Extraocular movements intact.      Conjunctiva/sclera: Conjunctivae normal.      Pupils: Pupils are equal, round, and reactive to light.   Neck:      Thyroid: No thyromegaly.      Vascular: No carotid bruit.   Cardiovascular:      Rate and Rhythm: Normal rate and regular rhythm.      Pulses: Normal pulses.      Heart sounds: Normal heart sounds.   Pulmonary:      Effort: Pulmonary effort is normal.      Breath sounds: Normal breath sounds.   Chest:      Breasts:         Right: Normal.         Left: Normal.   Abdominal:      General: Abdomen is flat. Bowel sounds are normal.      Palpations: Abdomen is soft. There is no hepatomegaly, splenomegaly or mass.      Tenderness: There is no abdominal tenderness.      Hernia: No hernia is present. There is no hernia in the left inguinal area or right inguinal area.   Genitourinary:     General: Normal vulva.      Exam position: Lithotomy position.      Labia:         Right: No rash, tenderness or lesion.         Left: No rash, tenderness or lesion.       Vagina: Normal. No vaginal discharge.      Cervix: Normal.      Uterus: Normal.       Adnexa: Right adnexa normal and left adnexa normal.      Rectum: Normal. Guaiac result negative.   Musculoskeletal:      Cervical " back: Normal range of motion and neck supple.      Right lower leg: No edema.      Left lower leg: No edema.      Comments: No vertebral tenderness.     Lymphadenopathy:      Cervical: No cervical adenopathy.      Upper Body:      Right upper body: No supraclavicular or axillary adenopathy.      Left upper body: No supraclavicular or axillary adenopathy.      Lower Body: No right inguinal adenopathy. No left inguinal adenopathy.   Skin:     General: Skin is warm and dry.      Findings: No lesion or rash.   Neurological:      General: No focal deficit present.      Mental Status: She is alert.      Motor: Motor function is intact.      Deep Tendon Reflexes: Reflexes are normal and symmetric.   Psychiatric:         Attention and Perception: Attention normal.         Mood and Affect: Mood normal.         Behavior: Behavior is cooperative.       Findings; occult blood negative/positive: negative      Assessment/Plan   Problems Addressed this Visit        Endocrine and Metabolic    Obesity (BMI 30.0-34.9)      Other Visit Diagnoses     Encounter for general adult medical examination with abnormal findings    -  Primary    Relevant Orders    Lipid Panel (Completed)    Comprehensive Metabolic Panel (Completed)    Vitamin D 25 Hydroxy (Completed)    Pap smear for cervical cancer screening        Relevant Orders    IGP,Aptima HPV,Age Gdln (Completed)    IGP, Aptima HPV, Rfx 16 / 18,45 (Completed)    Screening for colon cancer        Relevant Orders    POC FECAL OCCULT BLOOD BY IMMUNOASSAY    Need for vaccination        Relevant Orders    Tdap Vaccine Greater Than or Equal To 6yo IM (Completed)      Diagnoses       Codes Comments    Encounter for general adult medical examination with abnormal findings    -  Primary ICD-10-CM: Z00.01  ICD-9-CM: V70.0     Pap smear for cervical cancer screening     ICD-10-CM: Z12.4  ICD-9-CM: V76.2     Screening for colon cancer     ICD-10-CM: Z12.11  ICD-9-CM: V76.51     Need for vaccination      ICD-10-CM: Z23  ICD-9-CM: V05.9     Obesity (BMI 30.0-34.9)     ICD-10-CM: E66.9  ICD-9-CM: 278.00         She was counseled on the need for weight loss and encouraged to get a flu shot this Fall  tdap updated  Says she has had a colonoscopy  Encouraged her to use a TENS unit on her back/shoulder  Pap smear sent to pathology for eval  Labs ordered

## 2021-09-14 LAB
AGE GDLN ACOG TESTING: NORMAL
CYTOLOGIST CVX/VAG CYTO: NORMAL
CYTOLOGY CVX/VAG DOC CYTO: NORMAL
CYTOLOGY CVX/VAG DOC THIN PREP: NORMAL
DX ICD CODE: NORMAL
HIV 1 & 2 AB SER-IMP: NORMAL
HPV I/H RISK 4 DNA CVX QL PROBE+SIG AMP: NEGATIVE
Lab: NORMAL
OTHER STN SPEC: NORMAL
STAT OF ADQ CVX/VAG CYTO-IMP: NORMAL

## 2021-09-16 ENCOUNTER — LAB (OUTPATIENT)
Dept: FAMILY MEDICINE CLINIC | Facility: CLINIC | Age: 61
End: 2021-09-16

## 2021-09-16 DIAGNOSIS — Z00.01 ENCOUNTER FOR GENERAL ADULT MEDICAL EXAMINATION WITH ABNORMAL FINDINGS: ICD-10-CM

## 2021-09-16 PROCEDURE — 80053 COMPREHEN METABOLIC PANEL: CPT | Performed by: FAMILY MEDICINE

## 2021-09-16 PROCEDURE — 36415 COLL VENOUS BLD VENIPUNCTURE: CPT | Performed by: FAMILY MEDICINE

## 2021-09-16 PROCEDURE — 82306 VITAMIN D 25 HYDROXY: CPT | Performed by: FAMILY MEDICINE

## 2021-09-16 PROCEDURE — 80061 LIPID PANEL: CPT | Performed by: FAMILY MEDICINE

## 2021-09-17 LAB
25(OH)D3 SERPL-MCNC: 38.5 NG/ML
ALBUMIN SERPL-MCNC: 4.2 G/DL (ref 3.5–5.2)
ALBUMIN/GLOB SERPL: 1.6 G/DL
ALP SERPL-CCNC: 63 U/L (ref 39–117)
ALT SERPL W P-5'-P-CCNC: 12 U/L (ref 1–33)
ANION GAP SERPL CALCULATED.3IONS-SCNC: 9 MMOL/L (ref 5–15)
AST SERPL-CCNC: 16 U/L (ref 1–32)
BILIRUB SERPL-MCNC: 0.4 MG/DL (ref 0–1.2)
BUN SERPL-MCNC: 21 MG/DL (ref 8–23)
BUN/CREAT SERPL: 19.6 (ref 7–25)
CALCIUM SPEC-SCNC: 9 MG/DL (ref 8.6–10.5)
CHLORIDE SERPL-SCNC: 99 MMOL/L (ref 98–107)
CHOLEST SERPL-MCNC: 186 MG/DL (ref 0–200)
CO2 SERPL-SCNC: 31 MMOL/L (ref 22–29)
CREAT SERPL-MCNC: 1.07 MG/DL (ref 0.57–1)
GFR SERPL CREATININE-BSD FRML MDRD: 52 ML/MIN/1.73
GLOBULIN UR ELPH-MCNC: 2.7 GM/DL
GLUCOSE SERPL-MCNC: 81 MG/DL (ref 65–99)
HDLC SERPL-MCNC: 50 MG/DL (ref 40–60)
LDLC SERPL CALC-MCNC: 116 MG/DL (ref 0–100)
LDLC/HDLC SERPL: 2.27 {RATIO}
POTASSIUM SERPL-SCNC: 3.3 MMOL/L (ref 3.5–5.2)
PROT SERPL-MCNC: 6.9 G/DL (ref 6–8.5)
SODIUM SERPL-SCNC: 139 MMOL/L (ref 136–145)
TRIGL SERPL-MCNC: 112 MG/DL (ref 0–150)
VLDLC SERPL-MCNC: 20 MG/DL (ref 5–40)

## 2021-09-20 LAB — HEMOCCULT STL QL IA: NEGATIVE

## 2021-09-20 RX ORDER — POTASSIUM CHLORIDE 750 MG/1
10 TABLET, FILM COATED, EXTENDED RELEASE ORAL DAILY
Qty: 90 TABLET | Refills: 3 | Status: ON HOLD | OUTPATIENT
Start: 2021-09-20 | End: 2022-06-07

## 2021-09-22 RX ORDER — CETIRIZINE HYDROCHLORIDE 10 MG/1
TABLET ORAL
Qty: 90 TABLET | Refills: 3 | Status: SHIPPED | OUTPATIENT
Start: 2021-09-22 | End: 2022-09-25

## 2021-12-09 RX ORDER — TRIAMTERENE AND HYDROCHLOROTHIAZIDE 75; 50 MG/1; MG/1
TABLET ORAL
Qty: 90 TABLET | Refills: 0 | Status: SHIPPED | OUTPATIENT
Start: 2021-12-09 | End: 2022-03-14

## 2021-12-09 RX ORDER — PRAVASTATIN SODIUM 80 MG/1
TABLET ORAL
Qty: 90 TABLET | Refills: 0 | Status: SHIPPED | OUTPATIENT
Start: 2021-12-09 | End: 2022-03-14

## 2022-03-14 RX ORDER — TRIAMTERENE AND HYDROCHLOROTHIAZIDE 75; 50 MG/1; MG/1
TABLET ORAL
Qty: 90 TABLET | Refills: 0 | Status: SHIPPED | OUTPATIENT
Start: 2022-03-14 | End: 2022-04-22

## 2022-03-14 RX ORDER — PRAVASTATIN SODIUM 80 MG/1
TABLET ORAL
Qty: 90 TABLET | Refills: 0 | Status: SHIPPED | OUTPATIENT
Start: 2022-03-14 | End: 2022-04-22

## 2022-03-14 RX ORDER — METOPROLOL SUCCINATE 50 MG/1
TABLET, EXTENDED RELEASE ORAL
Qty: 90 TABLET | Refills: 0 | Status: SHIPPED | OUTPATIENT
Start: 2022-03-14 | End: 2022-04-22

## 2022-04-22 RX ORDER — PRAVASTATIN SODIUM 80 MG/1
TABLET ORAL
Qty: 90 TABLET | Refills: 0 | Status: SHIPPED | OUTPATIENT
Start: 2022-04-22 | End: 2022-06-08 | Stop reason: HOSPADM

## 2022-04-22 RX ORDER — METOPROLOL SUCCINATE 50 MG/1
TABLET, EXTENDED RELEASE ORAL
Qty: 90 TABLET | Refills: 0 | Status: SHIPPED | OUTPATIENT
Start: 2022-04-22 | End: 2022-07-27

## 2022-04-22 RX ORDER — TRIAMTERENE AND HYDROCHLOROTHIAZIDE 75; 50 MG/1; MG/1
TABLET ORAL
Qty: 90 TABLET | Refills: 0 | Status: SHIPPED | OUTPATIENT
Start: 2022-04-22 | End: 2022-06-08 | Stop reason: HOSPADM

## 2022-05-19 ENCOUNTER — OFFICE VISIT (OUTPATIENT)
Dept: FAMILY MEDICINE CLINIC | Facility: CLINIC | Age: 62
End: 2022-05-19

## 2022-05-19 VITALS
TEMPERATURE: 97.9 F | HEIGHT: 64 IN | HEART RATE: 64 BPM | SYSTOLIC BLOOD PRESSURE: 125 MMHG | OXYGEN SATURATION: 100 % | DIASTOLIC BLOOD PRESSURE: 79 MMHG | BODY MASS INDEX: 32.78 KG/M2 | WEIGHT: 192 LBS

## 2022-05-19 DIAGNOSIS — R10.2 PELVIC PAIN: ICD-10-CM

## 2022-05-19 DIAGNOSIS — E55.9 VITAMIN D DEFICIENCY: ICD-10-CM

## 2022-05-19 DIAGNOSIS — Z12.11 SCREENING FOR COLON CANCER: ICD-10-CM

## 2022-05-19 DIAGNOSIS — E78.2 MIXED HYPERLIPIDEMIA: Primary | ICD-10-CM

## 2022-05-19 DIAGNOSIS — I10 PRIMARY HYPERTENSION: ICD-10-CM

## 2022-05-19 DIAGNOSIS — Z12.31 BREAST CANCER SCREENING BY MAMMOGRAM: ICD-10-CM

## 2022-05-19 DIAGNOSIS — G47.9 SLEEP DISTURBANCE: ICD-10-CM

## 2022-05-19 PROCEDURE — 99213 OFFICE O/P EST LOW 20 MIN: CPT | Performed by: FAMILY MEDICINE

## 2022-05-19 RX ORDER — VENLAFAXINE HYDROCHLORIDE 75 MG/1
225 CAPSULE, EXTENDED RELEASE ORAL DAILY
COMMUNITY
Start: 2022-04-22 | End: 2022-06-08 | Stop reason: HOSPADM

## 2022-05-19 NOTE — PATIENT INSTRUCTIONS
Try taking the wellbutrin earlier in the morning and see if you sleep better  Keep working to lose weight through healthy eating and exercise.   Insomnia  Insomnia is a sleep disorder that makes it difficult to fall asleep or stay asleep. Insomnia can cause fatigue, low energy, difficulty concentrating, mood swings, and poor performance at work or school.  There are three different ways to classify insomnia:  Difficulty falling asleep.  Difficulty staying asleep.  Waking up too early in the morning.  Any type of insomnia can be long-term (chronic) or short-term (acute). Both are common. Short-term insomnia usually lasts for three months or less. Chronic insomnia occurs at least three times a week for longer than three months.  What are the causes?  Insomnia may be caused by another condition, situation, or substance, such as:  Anxiety.  Certain medicines.  Gastroesophageal reflux disease (GERD) or other gastrointestinal conditions.  Asthma or other breathing conditions.  Restless legs syndrome, sleep apnea, or other sleep disorders.  Chronic pain.  Menopause.  Stroke.  Abuse of alcohol, tobacco, or illegal drugs.  Mental health conditions, such as depression.  Caffeine.  Neurological disorders, such as Alzheimer's disease.  An overactive thyroid (hyperthyroidism).  Sometimes, the cause of insomnia may not be known.  What increases the risk?  Risk factors for insomnia include:  Gender. Women are affected more often than men.  Age. Insomnia is more common as you get older.  Stress.  Lack of exercise.  Irregular work schedule or working night shifts.  Traveling between different time zones.  Certain medical and mental health conditions.  What are the signs or symptoms?  If you have insomnia, the main symptom is having trouble falling asleep or having trouble staying asleep. This may lead to other symptoms, such as:  Feeling fatigued or having low energy.  Feeling nervous about going to sleep.  Not feeling rested in the  morning.  Having trouble concentrating.  Feeling irritable, anxious, or depressed.  How is this diagnosed?  This condition may be diagnosed based on:  Your symptoms and medical history. Your health care provider may ask about:  Your sleep habits.  Any medical conditions you have.  Your mental health.  A physical exam.  How is this treated?  Treatment for insomnia depends on the cause. Treatment may focus on treating an underlying condition that is causing insomnia. Treatment may also include:  Medicines to help you sleep.  Counseling or therapy.  Lifestyle adjustments to help you sleep better.  Follow these instructions at home:  Eating and drinking    Limit or avoid alcohol, caffeinated beverages, and cigarettes, especially close to bedtime. These can disrupt your sleep.  Do not eat a large meal or eat spicy foods right before bedtime. This can lead to digestive discomfort that can make it hard for you to sleep.    Sleep habits    Keep a sleep diary to help you and your health care provider figure out what could be causing your insomnia. Write down:  When you sleep.  When you wake up during the night.  How well you sleep.  How rested you feel the next day.  Any side effects of medicines you are taking.  What you eat and drink.  Make your bedroom a dark, comfortable place where it is easy to fall asleep.  Put up shades or blackout curtains to block light from outside.  Use a white noise machine to block noise.  Keep the temperature cool.  Limit screen use before bedtime. This includes:  Watching TV.  Using your smartphone, tablet, or computer.  Stick to a routine that includes going to bed and waking up at the same times every day and night. This can help you fall asleep faster. Consider making a quiet activity, such as reading, part of your nighttime routine.  Try to avoid taking naps during the day so that you sleep better at night.  Get out of bed if you are still awake after 15 minutes of trying to sleep. Keep  the lights down, but try reading or doing a quiet activity. When you feel sleepy, go back to bed.    General instructions  Take over-the-counter and prescription medicines only as told by your health care provider.  Exercise regularly, as told by your health care provider. Avoid exercise starting several hours before bedtime.  Use relaxation techniques to manage stress. Ask your health care provider to suggest some techniques that may work well for you. These may include:  Breathing exercises.  Routines to release muscle tension.  Visualizing peaceful scenes.  Make sure that you drive carefully. Avoid driving if you feel very sleepy.  Keep all follow-up visits as told by your health care provider. This is important.  Contact a health care provider if:  You are tired throughout the day.  You have trouble in your daily routine due to sleepiness.  You continue to have sleep problems, or your sleep problems get worse.  Get help right away if:  You have serious thoughts about hurting yourself or someone else.  If you ever feel like you may hurt yourself or others, or have thoughts about taking your own life, get help right away. You can go to your nearest emergency department or call:  Your local emergency services (911 in the [place]  A suicide crisis helpline, such as the National Suicide Prevention Lifeline at 1-428.603.6157. This is open 24 hours a day.  Summary  Insomnia is a sleep disorder that makes it difficult to fall asleep or stay asleep.  Insomnia can be long-term (chronic) or short-term (acute).  Treatment for insomnia depends on the cause. Treatment may focus on treating an underlying condition that is causing insomnia.  Keep a sleep diary to help you and your health care provider figure out what could be causing your insomnia.  This information is not intended to replace advice given to you by your health care provider. Make sure you discuss any questions you have with your health care provider.  Document  Revised: 10/28/2021 Document Reviewed: 10/28/2021  Elsevier Patient Education © 2021 [employer]

## 2022-05-19 NOTE — PROGRESS NOTES
Subjective   Sarah Brannon is a 62 y.o. female.     History of Present Illness   The patient comes in for follow-up on her blood pressure, cholesterol, and vitamin D deficiency.    Insomnia  The patient reports she has been experiencing insomnia for slightly less than 1 year. She states she sometimes forgets to take her medication during the weekend because she is not on her usual weekday schedule at that time due to sleeping later into the day. She reports she sleeps very well on the nights she does not take her medication, and she reports insomnia on the nights she does take the medication. The patient states she is still taking Wellbutrin  mg at approximately 9:00 AM. She adds she was taking 450 mg of bupropion, and she states Dr. Boykin decreased her dosage to 300 mg, and she reports the change has made no difference in her symptoms. She states she wakes daily at 5:30 AM and will take her medication at that time. The patient notes she does not typically sleep a lot, and she adds she is not rested if she does not dream while sleeping.      Health maintenance  The patient states she has not had a mammogram in 2 years because she was told to wait 1 year after undergoing a breast reduction in 06/2020. She reports the breast area is tender on occasion. The patient reports she is having irritable bowel syndrome symptoms and adds she needs to get a colonoscopy. She states she does not recall when she last underwent the procedure. The patient reports she has been trying to lose weight and has lost a couple of pounds. She adds she has been having cardiac palpitations, but she thinks it was because she does not take her blood pressure medications on the weekend. She states she takes her medication when she is symptomatic.    Left pelvic pain  The patient reports she has a stabbing pain on the left side of her pelvis approximately every other month. She denies any known family history of ovarian cancer. She  denies hysterectomy.    The following portions of the patient's history were reviewed and updated as appropriate: allergies, current medications, past family history, past medical history, past social history, past surgical history, and problem list.  Past Medical History:   Diagnosis Date   • Arthritis    • Baker's cyst of knee     RIGHT   • Depression    • Hiatal hernia    • History of breast augmentation 04/07/2020   • History of palpitations    • Hyperlipemia    • Hypertension    • Migraine    • PONV (postoperative nausea and vomiting)     SLOW TO WAKE   • Seasonal allergies      Past Surgical History:   Procedure Laterality Date   • BREAST AUGMENTATION Bilateral 06/2020   • ENDOMETRIAL ABLATION      menustrual   • FOOT SURGERY      rt and lt foot   • LEEP     • MOLE REMOVAL     • NASAL SEPTUM SURGERY     • TOTAL KNEE ARTHROPLASTY Right 8/23/2016    Procedure: RT TOTAL KNEE ARTHROPLASTY WITH [employer], depuy;  Surgeon: Hakan Mccann MD;  Location: [facility];  Service:      Family History   Problem Relation Age of Onset   • Heart disease Mother    • Heart disease Father      Social History     Socioeconomic History   • Marital status:    Tobacco Use   • Smoking status: Never Smoker   • Smokeless tobacco: Never Used   Vaping Use   • Vaping Use: Never used   Substance and Sexual Activity   • Alcohol use: No   • Drug use: No   • Sexual activity: Defer         Current Outpatient Medications:   •  acyclovir (ZOVIRAX) 800 MG tablet, TAKE 1/2 TABLET BY MOUTH ONE TIME A DAY , Disp: 90 tablet, Rfl: 2  •  buPROPion XL (WELLBUTRIN XL) 300 MG 24 hr tablet, Take 300 mg by mouth every morning., Disp: , Rfl:   •  cetirizine (zyrTEC) 10 MG tablet, TAKE 1 TABLET BY MOUTH EVERY DAY , Disp: 90 tablet, Rfl: 3  •  clobetasol (TEMOVATE) 0.05 % cream, Apply  topically to the appropriate area as directed 2 (Two) Times a Day., Disp: 30 g, Rfl: 1  •  metoprolol succinate XL (TOPROL-XL) 50 MG 24 hr tablet, TAKE 1  "TABLET BY MOUTH EVERY DAY, Disp: 90 tablet, Rfl: 0  •  potassium chloride (KLOR-CON) 10 MEQ CR tablet, Take 1 tablet by mouth Daily., Disp: 90 tablet, Rfl: 3  •  pravastatin (PRAVACHOL) 80 MG tablet, TAKE 1 TABLET BY MOUTH AT BEDTIME, Disp: 90 tablet, Rfl: 0  •  tiZANidine (ZANAFLEX) 4 MG tablet, Take 1 tablet by mouth At Night As Needed for Muscle Spasms., Disp: 15 tablet, Rfl: 0  •  triamterene-hydrochlorothiazide (MAXZIDE) 75-50 MG per tablet, TAKE 1 TABLET BY MOUTH EVERY DAY, Disp: 90 tablet, Rfl: 0  •  venlafaxine XR (EFFEXOR-XR) 75 MG 24 hr capsule, Take 225 mg by mouth Daily., Disp: , Rfl:     Review of Systems   Done and documented in HPI  /79 (BP Location: Left arm, Patient Position: Sitting, Cuff Size: Large Adult)   Pulse 64   Temp 97.9 °F (36.6 °C) (Temporal)   Ht 161.3 cm (63.5\")   Wt 87.1 kg (192 lb)   SpO2 100%   Breastfeeding No   BMI 33.48 kg/m²       Objective   Physical Exam  Vitals and nursing note reviewed.   Constitutional:       General: She is not in acute distress.     Appearance: She is well-developed and well-groomed. She is obese.   HENT:      Head: Normocephalic and atraumatic.   Neck:      Thyroid: No thyromegaly.   Cardiovascular:      Rate and Rhythm: Normal rate and regular rhythm.      Heart sounds: Normal heart sounds. No murmur heard.    No friction rub. No gallop.   Pulmonary:      Effort: Pulmonary effort is normal. No respiratory distress.      Breath sounds: Normal breath sounds. No wheezing or rales.      Comments: Lungs are clear to auscultation bilaterally.  Musculoskeletal:      Cervical back: Neck supple.      Right lower leg: No edema.      Left lower leg: No edema.   Lymphadenopathy:      Cervical: No cervical adenopathy.   Skin:     General: Skin is warm and dry.   Neurological:      Mental Status: She is alert.   Psychiatric:         Behavior: Behavior is cooperative.           Assessment & Plan   Problems Addressed this Visit        Cardiac and " Vasculature    Hyperlipidemia - Primary  - She will continue the Pravachol 80 mg.    Relevant Orders    Comprehensive Metabolic Panel    Lipid Panel    Hypertension  - This is well-controlled on metoprolol XL 50 mg daily and triamterene/hydrochlorothiazide 75 mg/50 mg.  - She was counseled on the need for weight loss.    Relevant Orders    Comprehensive Metabolic Panel    Lipid Panel       Endocrine and Metabolic    Vitamin D deficiency  - A CMP, a lipid panel, and a vitamin D were requested.    Relevant Orders    Vitamin D 25 Hydroxy      Other Visit Diagnoses     Screening for colon cancer      - She is due for colonoscopy and that was ordered.    Relevant Orders    Ambulatory Referral For Screening Colonoscopy (Completed)    Breast cancer screening by mammogram    - She is due for a mammogram and that was ordered. She was told when she had her breast reduction last year that she needed to wait a year before she had her mammogram, so she will be due next month.    Relevant Orders    Mammo Screening Digital Tomosynthesis Bilateral With CAD    Pelvic pain      - There is no family history of ovarian cancer or uterine cancer.  - I will send her for a pelvic ultrasound.    Relevant Orders    US Pelvis Transvaginal Non OB    Sleep disturbance   - I suspect it is related to the bupropion XL.  - She says that her psychiatrist decreased it from 450 mg down to 300 mg, but she has not noticed any difference.  - I suspect this is the cause because she takes it at about 9:00 AM. She will start taking it earlier than that.        Diagnoses       Codes Comments    Mixed hyperlipidemia    -  Primary ICD-10-CM: E78.2  ICD-9-CM: 272.2     Primary hypertension     ICD-10-CM: I10  ICD-9-CM: 401.9     Vitamin D deficiency     ICD-10-CM: E55.9  ICD-9-CM: 268.9     Screening for colon cancer     ICD-10-CM: Z12.11  ICD-9-CM: V76.51     Breast cancer screening by mammogram     ICD-10-CM: Z12.31  ICD-9-CM: V76.12     Pelvic pain      ICD-10-CM: R10.2  ICD-9-CM: [id number]     Sleep disturbance     ICD-10-CM: G47.9  ICD-9-CM: 780.50                   Transcribed from ambient dictation for Patricia Steele MD by Mikaela Amezcua.  05/19/22   11:07 EDT    Patient verbalized consent to the visit recording.

## 2022-05-24 RX ORDER — ERGOCALCIFEROL 1.25 MG/1
CAPSULE ORAL
Qty: 12 CAPSULE | Refills: 0 | OUTPATIENT
Start: 2022-05-24

## 2022-06-01 ENCOUNTER — APPOINTMENT (OUTPATIENT)
Dept: MAMMOGRAPHY | Facility: HOSPITAL | Age: 62
End: 2022-06-01

## 2022-06-01 ENCOUNTER — HOSPITAL ENCOUNTER (OUTPATIENT)
Dept: ULTRASOUND IMAGING | Facility: HOSPITAL | Age: 62
End: 2022-06-01

## 2022-06-06 ENCOUNTER — APPOINTMENT (OUTPATIENT)
Dept: CT IMAGING | Facility: HOSPITAL | Age: 62
End: 2022-06-06

## 2022-06-06 ENCOUNTER — APPOINTMENT (OUTPATIENT)
Dept: GENERAL RADIOLOGY | Facility: HOSPITAL | Age: 62
End: 2022-06-06

## 2022-06-06 ENCOUNTER — HOSPITAL ENCOUNTER (OUTPATIENT)
Facility: HOSPITAL | Age: 62
Setting detail: OBSERVATION
Discharge: HOME | End: 2022-06-08
Attending: EMERGENCY MEDICINE | Admitting: INTERNAL MEDICINE

## 2022-06-06 DIAGNOSIS — G45.9 TIA (TRANSIENT ISCHEMIC ATTACK): Primary | ICD-10-CM

## 2022-06-06 PROBLEM — E87.6 HYPOKALEMIA: Status: ACTIVE | Noted: 2022-06-06

## 2022-06-06 LAB
ABO GROUP BLD: NORMAL
ALBUMIN SERPL-MCNC: 4.1 G/DL (ref 3.5–5.2)
ALBUMIN/GLOB SERPL: 1.6 G/DL
ALP SERPL-CCNC: 69 U/L (ref 39–117)
ALT SERPL W P-5'-P-CCNC: 13 U/L (ref 1–33)
ANION GAP SERPL CALCULATED.3IONS-SCNC: 13 MMOL/L (ref 5–15)
APTT PPP: 28.8 SECONDS (ref 24–31)
AST SERPL-CCNC: 17 U/L (ref 1–32)
BASOPHILS # BLD AUTO: 0 10*3/MM3 (ref 0–0.2)
BASOPHILS NFR BLD AUTO: 0.5 % (ref 0–1.5)
BILIRUB SERPL-MCNC: 0.2 MG/DL (ref 0–1.2)
BLD GP AB SCN SERPL QL: NEGATIVE
BUN SERPL-MCNC: 19 MG/DL (ref 8–23)
BUN/CREAT SERPL: 18.4 (ref 7–25)
CALCIUM SPEC-SCNC: 8.9 MG/DL (ref 8.6–10.5)
CHLORIDE SERPL-SCNC: 98 MMOL/L (ref 98–107)
CO2 SERPL-SCNC: 26 MMOL/L (ref 22–29)
CREAT SERPL-MCNC: 1.03 MG/DL (ref 0.57–1)
DEPRECATED RDW RBC AUTO: 44.2 FL (ref 37–54)
EGFRCR SERPLBLD CKD-EPI 2021: 60.8 ML/MIN/1.73
EOSINOPHIL # BLD AUTO: 0.4 10*3/MM3 (ref 0–0.4)
EOSINOPHIL NFR BLD AUTO: 5.2 % (ref 0.3–6.2)
ERYTHROCYTE [DISTWIDTH] IN BLOOD BY AUTOMATED COUNT: 13.8 % (ref 12.3–15.4)
GLOBULIN UR ELPH-MCNC: 2.6 GM/DL
GLUCOSE BLDC GLUCOMTR-MCNC: 102 MG/DL (ref 70–105)
GLUCOSE SERPL-MCNC: 97 MG/DL (ref 65–99)
HCT VFR BLD AUTO: 41.5 % (ref 34–46.6)
HGB BLD-MCNC: 13.7 G/DL (ref 12–15.9)
HOLD SPECIMEN: NORMAL
HOLD SPECIMEN: NORMAL
INR PPP: 0.96 (ref 0.93–1.1)
LYMPHOCYTES # BLD AUTO: 2.6 10*3/MM3 (ref 0.7–3.1)
LYMPHOCYTES NFR BLD AUTO: 35.6 % (ref 19.6–45.3)
MCH RBC QN AUTO: 30.4 PG (ref 26.6–33)
MCHC RBC AUTO-ENTMCNC: 33.1 G/DL (ref 31.5–35.7)
MCV RBC AUTO: 91.8 FL (ref 79–97)
MONOCYTES # BLD AUTO: 0.5 10*3/MM3 (ref 0.1–0.9)
MONOCYTES NFR BLD AUTO: 6.4 % (ref 5–12)
NEUTROPHILS NFR BLD AUTO: 3.8 10*3/MM3 (ref 1.7–7)
NEUTROPHILS NFR BLD AUTO: 52.3 % (ref 42.7–76)
NRBC BLD AUTO-RTO: 0 /100 WBC (ref 0–0.2)
PLATELET # BLD AUTO: 299 10*3/MM3 (ref 140–450)
PMV BLD AUTO: 7.6 FL (ref 6–12)
POTASSIUM SERPL-SCNC: 3.2 MMOL/L (ref 3.5–5.2)
PROT SERPL-MCNC: 6.7 G/DL (ref 6–8.5)
PROTHROMBIN TIME: 9.9 SECONDS (ref 9.6–11.7)
RBC # BLD AUTO: 4.52 10*6/MM3 (ref 3.77–5.28)
RH BLD: POSITIVE
SARS-COV-2 RNA RESP QL NAA+PROBE: NOT DETECTED
SODIUM SERPL-SCNC: 137 MMOL/L (ref 136–145)
T&S EXPIRATION DATE: NORMAL
TROPONIN T SERPL-MCNC: <0.01 NG/ML (ref 0–0.03)
WBC NRBC COR # BLD: 7.2 10*3/MM3 (ref 3.4–10.8)
WHOLE BLOOD HOLD COAG: NORMAL
WHOLE BLOOD HOLD SPECIMEN: NORMAL

## 2022-06-06 PROCEDURE — 0 IOPAMIDOL PER 1 ML: Performed by: EMERGENCY MEDICINE

## 2022-06-06 PROCEDURE — 36415 COLL VENOUS BLD VENIPUNCTURE: CPT | Performed by: EMERGENCY MEDICINE

## 2022-06-06 PROCEDURE — 93005 ELECTROCARDIOGRAM TRACING: CPT | Performed by: EMERGENCY MEDICINE

## 2022-06-06 PROCEDURE — 70496 CT ANGIOGRAPHY HEAD: CPT

## 2022-06-06 PROCEDURE — 85730 THROMBOPLASTIN TIME PARTIAL: CPT | Performed by: EMERGENCY MEDICINE

## 2022-06-06 PROCEDURE — 82962 GLUCOSE BLOOD TEST: CPT

## 2022-06-06 PROCEDURE — U0003 INFECTIOUS AGENT DETECTION BY NUCLEIC ACID (DNA OR RNA); SEVERE ACUTE RESPIRATORY SYNDROME CORONAVIRUS 2 (SARS-COV-2) (CORONAVIRUS DISEASE [COVID-19]), AMPLIFIED PROBE TECHNIQUE, MAKING USE OF HIGH THROUGHPUT TECHNOLOGIES AS DESCRIBED BY CMS-2020-01-R: HCPCS | Performed by: NURSE PRACTITIONER

## 2022-06-06 PROCEDURE — 86901 BLOOD TYPING SEROLOGIC RH(D): CPT

## 2022-06-06 PROCEDURE — 71045 X-RAY EXAM CHEST 1 VIEW: CPT

## 2022-06-06 PROCEDURE — G0378 HOSPITAL OBSERVATION PER HR: HCPCS

## 2022-06-06 PROCEDURE — 84484 ASSAY OF TROPONIN QUANT: CPT | Performed by: EMERGENCY MEDICINE

## 2022-06-06 PROCEDURE — 99284 EMERGENCY DEPT VISIT MOD MDM: CPT

## 2022-06-06 PROCEDURE — 85610 PROTHROMBIN TIME: CPT | Performed by: EMERGENCY MEDICINE

## 2022-06-06 PROCEDURE — 86900 BLOOD TYPING SEROLOGIC ABO: CPT

## 2022-06-06 PROCEDURE — 86901 BLOOD TYPING SEROLOGIC RH(D): CPT | Performed by: EMERGENCY MEDICINE

## 2022-06-06 PROCEDURE — 96361 HYDRATE IV INFUSION ADD-ON: CPT

## 2022-06-06 PROCEDURE — 70498 CT ANGIOGRAPHY NECK: CPT

## 2022-06-06 PROCEDURE — 70450 CT HEAD/BRAIN W/O DYE: CPT

## 2022-06-06 PROCEDURE — 0042T HC CT CEREBRAL PERFUSION W/WO CONTRAST: CPT

## 2022-06-06 PROCEDURE — 80053 COMPREHEN METABOLIC PANEL: CPT | Performed by: EMERGENCY MEDICINE

## 2022-06-06 PROCEDURE — 85025 COMPLETE CBC W/AUTO DIFF WBC: CPT | Performed by: EMERGENCY MEDICINE

## 2022-06-06 PROCEDURE — 86900 BLOOD TYPING SEROLOGIC ABO: CPT | Performed by: EMERGENCY MEDICINE

## 2022-06-06 PROCEDURE — C9803 HOPD COVID-19 SPEC COLLECT: HCPCS

## 2022-06-06 PROCEDURE — 99218 PR INITIAL OBSERVATION CARE/DAY 30 MINUTES: CPT | Performed by: NURSE PRACTITIONER

## 2022-06-06 PROCEDURE — 86850 RBC ANTIBODY SCREEN: CPT | Performed by: EMERGENCY MEDICINE

## 2022-06-06 RX ORDER — ATORVASTATIN CALCIUM 40 MG/1
80 TABLET, FILM COATED ORAL NIGHTLY
Status: DISCONTINUED | OUTPATIENT
Start: 2022-06-06 | End: 2022-06-07

## 2022-06-06 RX ORDER — POTASSIUM CHLORIDE 7.45 MG/ML
10 INJECTION INTRAVENOUS
Status: DISCONTINUED | OUTPATIENT
Start: 2022-06-06 | End: 2022-06-08 | Stop reason: HOSPADM

## 2022-06-06 RX ORDER — POTASSIUM CHLORIDE 20 MEQ/1
40 TABLET, EXTENDED RELEASE ORAL ONCE
Status: COMPLETED | OUTPATIENT
Start: 2022-06-06 | End: 2022-06-06

## 2022-06-06 RX ORDER — SODIUM CHLORIDE 0.9 % (FLUSH) 0.9 %
10 SYRINGE (ML) INJECTION AS NEEDED
Status: DISCONTINUED | OUTPATIENT
Start: 2022-06-06 | End: 2022-06-08 | Stop reason: HOSPADM

## 2022-06-06 RX ORDER — SODIUM CHLORIDE 0.9 % (FLUSH) 0.9 %
3 SYRINGE (ML) INJECTION EVERY 12 HOURS SCHEDULED
Status: DISCONTINUED | OUTPATIENT
Start: 2022-06-06 | End: 2022-06-08 | Stop reason: HOSPADM

## 2022-06-06 RX ORDER — CLOPIDOGREL BISULFATE 75 MG/1
75 TABLET ORAL ONCE
Status: COMPLETED | OUTPATIENT
Start: 2022-06-06 | End: 2022-06-06

## 2022-06-06 RX ORDER — POTASSIUM CHLORIDE 1.5 G/1.77G
40 POWDER, FOR SOLUTION ORAL AS NEEDED
Status: DISCONTINUED | OUTPATIENT
Start: 2022-06-06 | End: 2022-06-08 | Stop reason: HOSPADM

## 2022-06-06 RX ORDER — SODIUM CHLORIDE 9 MG/ML
100 INJECTION, SOLUTION INTRAVENOUS CONTINUOUS
Status: DISCONTINUED | OUTPATIENT
Start: 2022-06-06 | End: 2022-06-08 | Stop reason: HOSPADM

## 2022-06-06 RX ORDER — ECHINACEA PURPUREA EXTRACT 125 MG
2 TABLET ORAL AS NEEDED
Status: DISCONTINUED | OUTPATIENT
Start: 2022-06-06 | End: 2022-06-08 | Stop reason: HOSPADM

## 2022-06-06 RX ORDER — ACETAMINOPHEN 650 MG/1
650 SUPPOSITORY RECTAL EVERY 4 HOURS PRN
Status: DISCONTINUED | OUTPATIENT
Start: 2022-06-06 | End: 2022-06-08 | Stop reason: HOSPADM

## 2022-06-06 RX ORDER — SODIUM CHLORIDE 0.9 % (FLUSH) 0.9 %
3-10 SYRINGE (ML) INJECTION AS NEEDED
Status: DISCONTINUED | OUTPATIENT
Start: 2022-06-06 | End: 2022-06-08 | Stop reason: HOSPADM

## 2022-06-06 RX ORDER — BISACODYL 10 MG
10 SUPPOSITORY, RECTAL RECTAL DAILY PRN
Status: DISCONTINUED | OUTPATIENT
Start: 2022-06-06 | End: 2022-06-08 | Stop reason: HOSPADM

## 2022-06-06 RX ORDER — ACETAMINOPHEN 325 MG/1
650 TABLET ORAL EVERY 4 HOURS PRN
Status: DISCONTINUED | OUTPATIENT
Start: 2022-06-06 | End: 2022-06-08 | Stop reason: HOSPADM

## 2022-06-06 RX ORDER — POTASSIUM CHLORIDE 20 MEQ/1
40 TABLET, EXTENDED RELEASE ORAL AS NEEDED
Status: DISCONTINUED | OUTPATIENT
Start: 2022-06-06 | End: 2022-06-08 | Stop reason: HOSPADM

## 2022-06-06 RX ADMIN — POTASSIUM CHLORIDE 40 MEQ: 1500 TABLET, EXTENDED RELEASE ORAL at 22:04

## 2022-06-06 RX ADMIN — SODIUM CHLORIDE 100 ML/HR: 9 INJECTION, SOLUTION INTRAVENOUS at 22:11

## 2022-06-06 RX ADMIN — IOPAMIDOL 50 ML: 755 INJECTION, SOLUTION INTRAVENOUS at 21:00

## 2022-06-06 RX ADMIN — Medication 3 ML: at 22:04

## 2022-06-06 RX ADMIN — ATORVASTATIN CALCIUM 80 MG: 40 TABLET, FILM COATED ORAL at 22:04

## 2022-06-06 RX ADMIN — IOPAMIDOL 100 ML: 755 INJECTION, SOLUTION INTRAVENOUS at 20:59

## 2022-06-06 RX ADMIN — CLOPIDOGREL BISULFATE 75 MG: 75 TABLET ORAL at 22:04

## 2022-06-07 ENCOUNTER — APPOINTMENT (OUTPATIENT)
Dept: MRI IMAGING | Facility: HOSPITAL | Age: 62
End: 2022-06-07

## 2022-06-07 ENCOUNTER — APPOINTMENT (OUTPATIENT)
Dept: CARDIOLOGY | Facility: HOSPITAL | Age: 62
End: 2022-06-07

## 2022-06-07 PROBLEM — J32.9 SINUSITIS: Status: ACTIVE | Noted: 2022-06-07

## 2022-06-07 LAB
ALBUMIN SERPL-MCNC: 3.8 G/DL (ref 3.5–5.2)
ALBUMIN/GLOB SERPL: 1.8 G/DL
ALP SERPL-CCNC: 59 U/L (ref 39–117)
ALT SERPL W P-5'-P-CCNC: 11 U/L (ref 1–33)
ANION GAP SERPL CALCULATED.3IONS-SCNC: 9 MMOL/L (ref 5–15)
AST SERPL-CCNC: 15 U/L (ref 1–32)
BILIRUB SERPL-MCNC: 0.2 MG/DL (ref 0–1.2)
BUN SERPL-MCNC: 18 MG/DL (ref 8–23)
BUN/CREAT SERPL: 20.5 (ref 7–25)
CALCIUM SPEC-SCNC: 8.3 MG/DL (ref 8.6–10.5)
CHLORIDE SERPL-SCNC: 102 MMOL/L (ref 98–107)
CHOLEST SERPL-MCNC: 157 MG/DL (ref 0–200)
CO2 SERPL-SCNC: 28 MMOL/L (ref 22–29)
CREAT SERPL-MCNC: 0.88 MG/DL (ref 0.57–1)
DEPRECATED RDW RBC AUTO: 44.6 FL (ref 37–54)
EGFRCR SERPLBLD CKD-EPI 2021: 73.5 ML/MIN/1.73
ERYTHROCYTE [DISTWIDTH] IN BLOOD BY AUTOMATED COUNT: 13.9 % (ref 12.3–15.4)
GLOBULIN UR ELPH-MCNC: 2.1 GM/DL
GLUCOSE BLDC GLUCOMTR-MCNC: 157 MG/DL (ref 70–105)
GLUCOSE SERPL-MCNC: 92 MG/DL (ref 65–99)
HBA1C MFR BLD: 5.7 % (ref 3.5–5.6)
HCT VFR BLD AUTO: 35.8 % (ref 34–46.6)
HDLC SERPL-MCNC: 37 MG/DL (ref 40–60)
HGB BLD-MCNC: 12 G/DL (ref 12–15.9)
LDLC SERPL CALC-MCNC: 92 MG/DL (ref 0–100)
LDLC/HDLC SERPL: 2.36 {RATIO}
MCH RBC QN AUTO: 30.9 PG (ref 26.6–33)
MCHC RBC AUTO-ENTMCNC: 33.5 G/DL (ref 31.5–35.7)
MCV RBC AUTO: 92.3 FL (ref 79–97)
PLATELET # BLD AUTO: 280 10*3/MM3 (ref 140–450)
PMV BLD AUTO: 7.9 FL (ref 6–12)
POTASSIUM SERPL-SCNC: 3.6 MMOL/L (ref 3.5–5.2)
PROCALCITONIN SERPL-MCNC: 0.06 NG/ML (ref 0–0.25)
PROT SERPL-MCNC: 5.9 G/DL (ref 6–8.5)
RBC # BLD AUTO: 3.88 10*6/MM3 (ref 3.77–5.28)
SODIUM SERPL-SCNC: 139 MMOL/L (ref 136–145)
TRIGL SERPL-MCNC: 163 MG/DL (ref 0–150)
TROPONIN T SERPL-MCNC: <0.01 NG/ML (ref 0–0.03)
TSH SERPL DL<=0.05 MIU/L-ACNC: 1.28 UIU/ML (ref 0.27–4.2)
VLDLC SERPL-MCNC: 28 MG/DL (ref 5–40)
WBC NRBC COR # BLD: 7.2 10*3/MM3 (ref 3.4–10.8)

## 2022-06-07 PROCEDURE — 93306 TTE W/DOPPLER COMPLETE: CPT

## 2022-06-07 PROCEDURE — 96374 THER/PROPH/DIAG INJ IV PUSH: CPT

## 2022-06-07 PROCEDURE — 97165 OT EVAL LOW COMPLEX 30 MIN: CPT

## 2022-06-07 PROCEDURE — 99244 OFF/OP CNSLTJ NEW/EST MOD 40: CPT | Performed by: INTERNAL MEDICINE

## 2022-06-07 PROCEDURE — 99214 OFFICE O/P EST MOD 30 MIN: CPT | Performed by: PSYCHIATRY & NEUROLOGY

## 2022-06-07 PROCEDURE — 80053 COMPREHEN METABOLIC PANEL: CPT | Performed by: NURSE PRACTITIONER

## 2022-06-07 PROCEDURE — G0378 HOSPITAL OBSERVATION PER HR: HCPCS

## 2022-06-07 PROCEDURE — 97162 PT EVAL MOD COMPLEX 30 MIN: CPT

## 2022-06-07 PROCEDURE — 84443 ASSAY THYROID STIM HORMONE: CPT | Performed by: NURSE PRACTITIONER

## 2022-06-07 PROCEDURE — 82962 GLUCOSE BLOOD TEST: CPT

## 2022-06-07 PROCEDURE — 84145 PROCALCITONIN (PCT): CPT | Performed by: NURSE PRACTITIONER

## 2022-06-07 PROCEDURE — 83036 HEMOGLOBIN GLYCOSYLATED A1C: CPT | Performed by: NURSE PRACTITIONER

## 2022-06-07 PROCEDURE — 70551 MRI BRAIN STEM W/O DYE: CPT

## 2022-06-07 PROCEDURE — 93306 TTE W/DOPPLER COMPLETE: CPT | Performed by: INTERNAL MEDICINE

## 2022-06-07 PROCEDURE — 99225 PR SBSQ OBSERVATION CARE/DAY 25 MINUTES: CPT | Performed by: INTERNAL MEDICINE

## 2022-06-07 PROCEDURE — 84484 ASSAY OF TROPONIN QUANT: CPT | Performed by: NURSE PRACTITIONER

## 2022-06-07 PROCEDURE — 92523 SPEECH SOUND LANG COMPREHEN: CPT

## 2022-06-07 PROCEDURE — 85027 COMPLETE CBC AUTOMATED: CPT | Performed by: NURSE PRACTITIONER

## 2022-06-07 PROCEDURE — 80061 LIPID PANEL: CPT | Performed by: NURSE PRACTITIONER

## 2022-06-07 PROCEDURE — 25010000002 LORAZEPAM PER 2 MG

## 2022-06-07 RX ORDER — ATORVASTATIN CALCIUM 20 MG/1
20 TABLET, FILM COATED ORAL NIGHTLY
Refills: 0 | Status: DISCONTINUED | OUTPATIENT
Start: 2022-06-07 | End: 2022-06-07

## 2022-06-07 RX ORDER — BUPROPION HYDROCHLORIDE 150 MG/1
300 TABLET ORAL DAILY
Status: DISCONTINUED | OUTPATIENT
Start: 2022-06-07 | End: 2022-06-08 | Stop reason: HOSPADM

## 2022-06-07 RX ORDER — LORAZEPAM 1 MG/1
2 TABLET ORAL ONCE
Status: DISCONTINUED | OUTPATIENT
Start: 2022-06-07 | End: 2022-06-07

## 2022-06-07 RX ORDER — LORAZEPAM 2 MG/ML
2 CONCENTRATE ORAL ONCE
Status: DISCONTINUED | OUTPATIENT
Start: 2022-06-07 | End: 2022-06-07

## 2022-06-07 RX ORDER — CETIRIZINE HYDROCHLORIDE 10 MG/1
10 TABLET ORAL DAILY
Status: DISCONTINUED | OUTPATIENT
Start: 2022-06-07 | End: 2022-06-08 | Stop reason: HOSPADM

## 2022-06-07 RX ORDER — METOPROLOL SUCCINATE 50 MG/1
50 TABLET, EXTENDED RELEASE ORAL DAILY
Status: DISCONTINUED | OUTPATIENT
Start: 2022-06-07 | End: 2022-06-08 | Stop reason: HOSPADM

## 2022-06-07 RX ORDER — LORAZEPAM 2 MG/ML
2 INJECTION INTRAMUSCULAR ONCE
Status: COMPLETED | OUTPATIENT
Start: 2022-06-07 | End: 2022-06-07

## 2022-06-07 RX ORDER — TIZANIDINE 4 MG/1
4 TABLET ORAL NIGHTLY PRN
Status: DISCONTINUED | OUTPATIENT
Start: 2022-06-07 | End: 2022-06-08 | Stop reason: HOSPADM

## 2022-06-07 RX ORDER — ATORVASTATIN CALCIUM 40 MG/1
40 TABLET, FILM COATED ORAL NIGHTLY
Status: DISCONTINUED | OUTPATIENT
Start: 2022-06-07 | End: 2022-06-08 | Stop reason: HOSPADM

## 2022-06-07 RX ORDER — CLOBETASOL PROPIONATE 0.5 MG/G
CREAM TOPICAL 2 TIMES DAILY
Status: DISCONTINUED | OUTPATIENT
Start: 2022-06-07 | End: 2022-06-08 | Stop reason: HOSPADM

## 2022-06-07 RX ORDER — VENLAFAXINE HYDROCHLORIDE 75 MG/1
225 CAPSULE, EXTENDED RELEASE ORAL DAILY
Status: DISCONTINUED | OUTPATIENT
Start: 2022-06-07 | End: 2022-06-08 | Stop reason: HOSPADM

## 2022-06-07 RX ADMIN — ATORVASTATIN CALCIUM 40 MG: 40 TABLET, FILM COATED ORAL at 21:30

## 2022-06-07 RX ADMIN — METOPROLOL SUCCINATE 50 MG: 50 TABLET, EXTENDED RELEASE ORAL at 09:51

## 2022-06-07 RX ADMIN — LORAZEPAM 2 MG: 2 INJECTION INTRAMUSCULAR; INTRAVENOUS at 07:27

## 2022-06-07 RX ADMIN — Medication 3 ML: at 21:30

## 2022-06-07 RX ADMIN — BUPROPION HYDROCHLORIDE 300 MG: 150 TABLET, EXTENDED RELEASE ORAL at 09:51

## 2022-06-07 RX ADMIN — CLOBETASOL PROPIONATE: 0.5 CREAM TOPICAL at 09:52

## 2022-06-07 RX ADMIN — CLOBETASOL PROPIONATE: 0.5 CREAM TOPICAL at 21:30

## 2022-06-07 RX ADMIN — VENLAFAXINE HYDROCHLORIDE 225 MG: 75 CAPSULE, EXTENDED RELEASE ORAL at 09:51

## 2022-06-07 RX ADMIN — Medication 3 ML: at 09:50

## 2022-06-07 RX ADMIN — CETIRIZINE HYDROCHLORIDE 10 MG: 10 TABLET ORAL at 09:52

## 2022-06-07 NOTE — PLAN OF CARE
Goal Outcome Evaluation:  Plan of Care Reviewed With: patient, spouse        Progress: no change  Outcome Evaluation: Informal speech language evaluation completed in setting of MRI + for R parietal acute infarct.     Question mild L sided labial weakness, however adequate ROM. Discussed and demonstrated ROM exercises for labial and lingual ROM.     Pt reports complaints of motor speech have resolved. Speech at conversation level 100% intelligible with no dysarthria or apraxia characteristics - no articulatory imprecision, adequate intensity, rate, quality. Discussed strategies for dysarthria and symptoms may reoccur with fatigue, stress, acute illness.     Auditory comprehension intact, can follow all commands and respond appropriately to all yes/no and wh- questions.     Expressive language intact. Pt endorses intermittent word finding difficulties with low frequency words. Discussed strategies for word finding and aging process versus abnormal characteristics. Pt and spouse verbalize understanding.     Cognition intact for functional recall of hospitalization course. Reports of word finding addressed as above.     Pt appears returned to baseline function. Discussed exercises, strategies for word finding and dysarthria. Further educated on SLP scope of practice, availability for OP referral if difficulties arise. Both verbalize understanding.     Will sign off as no acute needs are identified.

## 2022-06-07 NOTE — PLAN OF CARE
Problem: Adult Inpatient Plan of Care  Goal: Plan of Care Review  Outcome: Ongoing, Progressing  Flowsheets (Taken 6/7/2022 0337)  Plan of Care Reviewed With:   patient   spouse  Goal: Patient-Specific Goal (Individualized)  Outcome: Ongoing, Progressing  Goal: Absence of Hospital-Acquired Illness or Injury  Outcome: Ongoing, Progressing  Intervention: Identify and Manage Fall Risk  Recent Flowsheet Documentation  Taken 6/7/2022 0200 by Ale Lopez RN  Safety Promotion/Fall Prevention:   clutter free environment maintained   nonskid shoes/slippers when out of bed   room organization consistent   safety round/check completed  Taken 6/6/2022 2340 by Ale Lopez RN  Safety Promotion/Fall Prevention:   clutter free environment maintained   nonskid shoes/slippers when out of bed   room organization consistent   safety round/check completed  Intervention: Prevent and Manage VTE (Venous Thromboembolism) Risk  Recent Flowsheet Documentation  Taken 6/6/2022 2340 by Ale Lopez RN  VTE Prevention/Management: sequential compression devices on  Intervention: Prevent Infection  Recent Flowsheet Documentation  Taken 6/7/2022 0200 by Ale Lopez RN  Infection Prevention:   single patient room provided   rest/sleep promoted   personal protective equipment utilized   hand hygiene promoted  Taken 6/6/2022 2340 by Ale Lopez RN  Infection Prevention:   single patient room provided   personal protective equipment utilized   hand hygiene promoted  Goal: Optimal Comfort and Wellbeing  Outcome: Ongoing, Progressing  Intervention: Provide Person-Centered Care  Recent Flowsheet Documentation  Taken 6/6/2022 2340 by Ale Lopez RN  Trust Relationship/Rapport:   care explained   choices provided   questions answered   questions encouraged   reassurance provided   thoughts/feelings acknowledged  Goal: Readiness for Transition of Care  Outcome: Ongoing, Progressing  Intervention: Mutually Develop Transition  Plan  Recent Flowsheet Documentation  Taken 6/7/2022 0021 by Ale Lopez, RN  Transportation Anticipated: family or friend will provide  Patient/Family Anticipated Services at Transition: none  Patient/Family Anticipates Transition to: home  Taken 6/7/2022 0016 by Ale Lopez, RN  Equipment Currently Used at Home: none     Problem: Hypertension Comorbidity  Goal: Blood Pressure in Desired Range  Outcome: Ongoing, Progressing  Intervention: Maintain Blood Pressure Management  Recent Flowsheet Documentation  Taken 6/6/2022 2340 by Ale Lopez RN  Medication Review/Management: medications reviewed   Goal Outcome Evaluation:  Plan of Care Reviewed With: patient, spouse   Pt planning to have echo and poss MRI today. Pt and spouse very pleasant. Safety precautions maintained and call light within reach.

## 2022-06-07 NOTE — ED NOTES
"1945 started feeling \"electricity\" going down my right arm. At kitchen table speaking with ,  reports patient slurring words. Pt ok walking. Pt couldn't \"find laces\" to tie shoes. Patient reports all symptoms have resolved. Patient states episode lasted abt 10mins.   "

## 2022-06-07 NOTE — PLAN OF CARE
Goal Outcome Evaluation:  Plan of Care Reviewed With: patient        Progress: no change  Outcome Evaluation:     SLP orders generated via stroke order set. Pt has passed swallow screen in ED, placed on regular diet per MD orders. Skilled bedside swallow evaluation deferred at this time.     Complaints of slurred speech at admit. CT head negative for acute CVA, with MRI and neurology workup pending.     SLP will follow peripherally pending clinical course to determine if speech language evaluation is indicated.

## 2022-06-07 NOTE — PLAN OF CARE
Problem: Adult Inpatient Plan of Care  Goal: Plan of Care Review  Outcome: Ongoing, Progressing  Goal: Patient-Specific Goal (Individualized)  Outcome: Ongoing, Progressing  Goal: Absence of Hospital-Acquired Illness or Injury  Outcome: Ongoing, Progressing  Intervention: Identify and Manage Fall Risk  Recent Flowsheet Documentation  Taken 6/7/2022 0730 by Donna Morrison RN  Safety Promotion/Fall Prevention:   safety round/check completed   activity supervised   assistive device/personal items within reach   clutter free environment maintained   fall prevention program maintained   gait belt   nonskid shoes/slippers when out of bed  Intervention: Prevent and Manage VTE (Venous Thromboembolism) Risk  Recent Flowsheet Documentation  Taken 6/7/2022 0730 by Donna Morrison RN  Activity Management: up in chair  Intervention: Prevent Infection  Recent Flowsheet Documentation  Taken 6/7/2022 0730 by Donna Morrison RN  Infection Prevention:   hand hygiene promoted   rest/sleep promoted  Goal: Optimal Comfort and Wellbeing  Outcome: Ongoing, Progressing  Goal: Readiness for Transition of Care  Outcome: Ongoing, Progressing   Goal Outcome Evaluation: Patient denies any stroke symptoms at this time.

## 2022-06-07 NOTE — THERAPY EVALUATION
Patient Name: Sarah Brannon  : 1960    MRN: [id number]                              Today's Date: 2022       Admit Date: 2022    Visit Dx:     ICD-10-CM ICD-9-CM   1. TIA (transient ischemic attack)  G45.9 435.9     Patient Active Problem List   Diagnosis   • Primary osteoarthritis of right knee   • Status post right knee replacement   • Hx of total knee arthroplasty   • History of total knee arthroplasty   • Allergic rhinitis   • Anaclitic depression   • Anemia   • Breast hypertrophy   • Bunion   • Edema   • Encounter for general adult medical examination without abnormal findings   • Gout   • Hyperlipidemia   • Hypertension   • Macromastia   • Mastodynia   • Migraine headache   • Obesity (BMI 30.0-34.9)   • Presbyopia   • Vitamin D deficiency   • Intrinsic eczema   • TIA (transient ischemic attack)   • Hypokalemia   • Sinusitis     Past Medical History:   Diagnosis Date   • Arthritis    • Baker's cyst of knee     RIGHT   • Depression    • Hiatal hernia    • History of breast augmentation 2020   • History of palpitations    • Hyperlipemia    • Hypertension    • Migraine    • PONV (postoperative nausea and vomiting)     SLOW TO WAKE   • Seasonal allergies      Past Surgical History:   Procedure Laterality Date   • BREAST AUGMENTATION Bilateral 2020   • ENDOMETRIAL ABLATION      menustrual   • FOOT SURGERY      rt and lt foot   • LEEP     • MOLE REMOVAL     • NASAL SEPTUM SURGERY     • TOTAL KNEE ARTHROPLASTY Right 2016    Procedure: RT TOTAL KNEE ARTHROPLASTY WITH ROBERTO NAVIGATION, depuy;  Surgeon: Hakan Mccann MD;  Location: [facility] OR;  Service:       General Information     Row Name 22 1452          Physical Therapy Time and Intention    Document Type evaluation  -KB     Mode of Treatment physical therapy  -KB     Row Name 22 1452          General Information    Patient Profile Reviewed yes  -KB     Prior Level of Function independent:;community  mobility;gait;ADL's;driving;cooking;home management  -KB     Existing Precautions/Restrictions no known precautions/restrictions  -KB     Row Name 06/07/22 1452          Living Environment    People in Home spouse;child(ela), dependent  18 y/o son  -KB     Row Name 06/07/22 1452          Home Main Entrance    Number of Stairs, Main Entrance one  -KB     Row Name 06/07/22 1452          Stairs Within Home, Primary    Number of Stairs, Within Home, Primary none  -KB     Row Name 06/07/22 1452          Cognition    Orientation Status (Cognition) oriented x 4  -KB           User Key  (r) = Recorded By, (t) = Taken By, (c) = Cosigned By    Initials Name Provider Type    Sarah Santillan PT Physical Therapist               Mobility     Row Name 06/07/22 1537          Bed Mobility    Supine-Sit [place] (Bed Mobility) independent  -KB     Row Name 06/07/22 1537          Sit-Stand Transfer    Sit-Stand [place] (Transfers) independent  -KB     Row Name 06/07/22 1537          Gait/Stairs (Locomotion)    [place] Level (Gait) standby assist  -KB     Distance in Feet (Gait) 220 ft  -KB     Comment, (Gait/Stairs) Pt had slight lateral sway and a minor loss of balance which she self corrected. Pt attributed it to recent medication given.  -KB           User Key  (r) = Recorded By, (t) = Taken By, (c) = Cosigned By    Initials Name Provider Type    Sarah Santillan PT Physical Therapist               Obj/Interventions     Row Name 06/07/22 1544          Range of Motion Comprehensive    General Range of Motion no range of motion deficits identified  -     Row Name 06/07/22 1544          Strength Comprehensive (MMT)    General Manual Muscle Testing (MMT) Assessment no strength deficits identified  -     Comment, General Manual Muscle Testing (MMT) Assessment >3/5  -KB     Row Name 06/07/22 1544          Motor Skills    Motor Skills coordination  coordination equal bilaterally, ddk (-), dysmetria (-)  -      Coordination WNL  -     Row Name 06/07/22 1544          Balance    Static Sitting Balance independent  -KB     Position, Sitting Balance unsupported;sitting edge of bed  -KB     Static Standing Balance independent  -KB     Dynamic Standing Balance standby assist  -KB     Position/Device Used, Standing Balance unsupported  -     Row Name 06/07/22 1544          Sensory Assessment (Somatosensory)    Sensory Assessment (Somatosensory) sensation intact  -           User Key  (r) = Recorded By, (t) = Taken By, (c) = Cosigned By    Initials Name Provider Type    Sarah Santillan, PT Physical Therapist               Goals/Plan    No documentation.                Clinical Impression     Row Name 06/07/22 1547          Pain    Pretreatment Pain Rating 0/10 - no pain  -KB     Posttreatment Pain Rating 0/10 - no pain  -     Row Name 06/07/22 1547          Plan of Care Review    Plan of Care Reviewed With patient  -KB     Outcome Evaluation 63 y/o F who came to the hospital after an episode of confusion, slurred speech and left arm numbness and weakness. At baseline, pt lives with  and dependent son in one story home with 1 step upon entry. Pt independent with ADLs and drives. This date, pt denies any symptoms, independent with bed mobility and transitioning to stand from EOB. Ambulation of 220 ft with SBA and no AD. Vital sign WNL. Due to pts ability to function at baseline, anticipate d/c routine home.  -     Row Name 06/07/22 1547          Therapy Assessment/Plan (PT)    Patient/Family Therapy Goals Statement (PT) --  -KB     Rehab Potential (PT) good, to achieve stated therapy goals  -KB     Criteria for Skilled Interventions Met (PT) no problems identified which require skilled intervention  -     Therapy Frequency (PT) evaluation only  -     Row Name 06/07/22 1547          Positioning and Restraints    Pre-Treatment Position in bed  -KB     Post Treatment Position chair  -KB     In Bed call light  within reach;exit alarm on  -           User Key  (r) = Recorded By, (t) = Taken By, (c) = Cosigned By    Initials Name Provider Type    Sarah Santillan, SHANIKA Physical Therapist               Outcome Measures     Row Name 06/07/22 1554          Modified [place] Scale    Pre-Stroke Modified [place] Scale 0 - No Symptoms at all.  -KB     Modified [place] Scale 0 - No Symptoms at all.  -     Row Name 06/07/22 1554          Functional Assessment    Outcome Measure Options Modified [place]  -           User Key  (r) = Recorded By, (t) = Taken By, (c) = Cosigned By    Initials Name Provider Type    Sarah Santillan, PT Physical Therapist                             Physical Therapy Education                 Title: PT OT SLP Therapies (Done)     Topic: Physical Therapy (Done)     Point: Mobility training (Done)     Learning Progress Summary           Patient Acceptance, E, VU by BRISA at 6/7/2022 1556                               User Key     Initials Effective Dates Name Provider Type Discipline     05/25/22 -  Sarah Orlando PT Physical Therapist PT              PT Recommendation and Plan     Plan of Care Reviewed With: patient  Outcome Evaluation: 61 y/o F who came to the hospital after an episode of confusion, slurred speech and left arm numbness and weakness. At baseline, pt lives with  and dependent son in one story home with 1 step upon entry. Pt independent with ADLs and drives. This date, pt denies any symptoms, independent with bed mobility and transitioning to stand from EOB. Ambulation of 220 ft with SBA and no AD. Vital sign WNL. Due to pts ability to function at baseline, anticipate d/c routine home.     Time Calculation:    PT Charges     Row Name 06/07/22 1556             Time Calculation    Start Time 1300  -KB      Stop Time 1324  -KB      Time Calculation (min) 24 min  -KB      PT Received On 06/07/22  -              Time Calculation- PT    Total Timed Code Minutes- PT 0 minute(s)  -             User Key  (r) = Recorded By, (t) = Taken By, (c) = Cosigned By    Initials Name Provider Type    KB Sarah Orlando, PT Physical Therapist              Therapy Charges for Today     Code Description Service Date Service Provider Modifiers Qty    19501738059 HC PT EVAL MOD COMPLEXITY 4 6/7/2022 Sarah Orlando, PT GP 1          PT G-Codes  Outcome Measure Options: Modified [place]  AM-PAC 6 Clicks Score (PT): 24  Modified [place] Scale: 0 - No Symptoms at all.    SARAH ORLANDO, PT  6/7/2022

## 2022-06-07 NOTE — PLAN OF CARE
Goal Outcome Evaluation:  Plan of Care Reviewed With: patient, spouse           Outcome Evaluation: Sarah Brannon is a 62 y.o. female who was admitted last night after an episode of slurred speech and trouble communicating between herself and her , along with sensation changes from elbows to hands. She now has d/o TIA. At baseline, patient lives with her  in a one story home with one step to enter. She is typically independent with ADLs and IADLs including driving, cooking, shopping. She does not use assistive devices. Upon OT evaluation, all of patient's symptoms have been resolved. She reports feeling 100% back to normal. Evaulation findings indicate that she has no functional deficits. She is independent with ADLs and independent with functional distance mobility with no AD nor hands on assistance. OT signing off, as there is no need for pt to continue with skilled OT services unless change in medical status occurs. Pt safe to return home.

## 2022-06-07 NOTE — ED PROVIDER NOTES
Subjective   Chief complaint speech difficulty trouble with left arm    History of present illness 62-year-old female states she was sitting at home and had sudden onset of slurring of her words and trouble using her left arm and cannot really tie her shoe time at 7:45 PM.  She states last about 10 minutes and it subsequently has resolved.  She has a bit of dizziness with it but no headache no loss of vision or visual changes.  No chest pain neck arm jaw pain.  Nothing really seem to make it better or worse lasted 10 minutes moderate to severe and associated with the above when she was at home.  No recent injury illness.  Denies any pain.          Review of Systems   Constitutional: Negative for chills and fever.   HENT: Negative for congestion and sinus pressure.    Eyes: Negative for photophobia and visual disturbance.   Respiratory: Negative for chest tightness and shortness of breath.    Cardiovascular: Positive for palpitations. Negative for chest pain.   Gastrointestinal: Negative for abdominal pain and vomiting.   Endocrine: Negative for cold intolerance and heat intolerance.   Genitourinary: Negative for difficulty urinating and dysuria.   Musculoskeletal: Positive for arthralgias. Negative for back pain and neck pain.   Skin: Negative for color change and rash.   Neurological: Positive for dizziness, speech difficulty and weakness. Negative for facial asymmetry.   Psychiatric/Behavioral: Negative for agitation, behavioral problems and confusion.       Past Medical History:   Diagnosis Date   • Arthritis    • Baker's cyst of knee     RIGHT   • Depression    • Hiatal hernia    • History of breast augmentation 04/07/2020   • History of palpitations    • Hyperlipemia    • Hypertension    • Migraine    • PONV (postoperative nausea and vomiting)     SLOW TO WAKE   • Seasonal allergies        Allergies   Allergen Reactions   • Bee Venom Swelling   • Shellfish-Derived Products Swelling     Lips became swollen.        Past Surgical History:   Procedure Laterality Date   • BREAST AUGMENTATION Bilateral 06/2020   • ENDOMETRIAL ABLATION      menustrual   • FOOT SURGERY      rt and lt foot   • LEEP     • MOLE REMOVAL     • NASAL SEPTUM SURGERY     • TOTAL KNEE ARTHROPLASTY Right 8/23/2016    Procedure: RT TOTAL KNEE ARTHROPLASTY WITH ROBERTO NAVIGATION, depuy;  Surgeon: Hakan Mccann MD;  Location: [facility] OR;  Service:        Family History   Problem Relation Age of Onset   • Heart disease Mother    • Heart disease Father        Social History     Socioeconomic History   • Marital status:    Tobacco Use   • Smoking status: Never Smoker   • Smokeless tobacco: Never Used   Vaping Use   • Vaping Use: Never used   Substance and Sexual Activity   • Alcohol use: No   • Drug use: No   • Sexual activity: Defer     Prior to Admission medications    Medication Sig Start Date End Date Taking? Authorizing Provider   acyclovir (ZOVIRAX) 800 MG tablet TAKE 1/2 TABLET BY MOUTH ONE TIME A DAY  7/6/21   Patricia Steele MD   buPROPion XL (WELLBUTRIN XL) 300 MG 24 hr tablet Take 300 mg by mouth every morning. 7/25/16   Provider, MD Jody   cetirizine (zyrTEC) 10 MG tablet TAKE 1 TABLET BY MOUTH EVERY DAY  9/22/21   Patricia Steele MD   Cholecalciferol 1.25 MG (54735 UT) tablet Take 1 tablet by mouth 1 (One) Time Per Week. 5/25/22   Patricia Steele MD   clobetasol (TEMOVATE) 0.05 % cream Apply  topically to the appropriate area as directed 2 (Two) Times a Day. 4/15/20   Patricia Steele MD   metoprolol succinate XL (TOPROL-XL) 50 MG 24 hr tablet TAKE 1 TABLET BY MOUTH EVERY DAY 4/22/22   Patricia Steele MD   potassium chloride (KLOR-CON) 10 MEQ CR tablet Take 1 tablet by mouth Daily. 9/20/21   Patricia Steele MD   pravastatin (PRAVACHOL) 80 MG tablet TAKE 1 TABLET BY MOUTH AT BEDTIME 4/22/22   Patricia Steele MD   tiZANidine (ZANAFLEX) 4 MG tablet Take  1 tablet by mouth At Night As Needed for Muscle Spasms. 3/9/21   Patricia Steele MD   triamterene-hydrochlorothiazide (MAXZIDE) 75-50 MG per tablet TAKE 1 TABLET BY MOUTH EVERY DAY 4/22/22   Patricia Steele MD   venlafaxine XR (EFFEXOR-XR) 75 MG 24 hr capsule Take 225 mg by mouth Daily. 4/22/22   Provider, MD Jody           Objective   Physical Exam  62-year-old female awake alert in no acute distress at this time initial blood pressure 120/82 temperature 90.2 patient has a heart rate 93 respiration of 12 sats 99% on room air.  HEENT extraocular muscles are intact pupils equal round reactive there is no photophobia no papilledema.  Neck supple no adenopathy no meningeal signs no JVD no bruits.  Lungs clear no retractions heart regular without murmur.  Abdomen soft without tenderness good bowel sounds no peritoneal findings or pulsatile masses.  Extremities pulses are equal throughout upper and lower extremities no edema no cords or Homans' sign or evidence of DVT.  Skin is warm and dry without rashes or cellulitic changes neurologic awake alert orientated x4 no facial asymmetry there is no nystagmus peripheral vision intact confrontation tongue soft palate normal.  No drift the arms or legs normal finger-to-nose patient is able stand and walk without difficulty there is no ataxia she can tie her shoe without difficulty NIH is 0  Procedures           ED Course      Results for orders placed or performed during the hospital encounter of 06/06/22   COVID-19,CEPHEID/VIVIAN,COR/KYLIE/PAD/ROMI IN-HOUSE(OR EMERGENT/ADD-ON),NP SWAB IN TRANSPORT MEDIA 3-4 HR TAT, RT-PCR - Swab, Nasopharynx    Specimen: Nasopharynx; Swab   Result Value Ref Range    COVID19 Not Detected Not Detected - Ref. Range   Comprehensive Metabolic Panel    Specimen: Blood   Result Value Ref Range    Glucose 97 65 - 99 mg/dL    BUN 19 8 - 23 mg/dL    Creatinine 1.03 (H) 0.57 - 1.00 mg/dL    Sodium 137 136 - 145 mmol/L    Potassium  3.2 (L) 3.5 - 5.2 mmol/L    Chloride 98 98 - 107 mmol/L    CO2 26.0 22.0 - 29.0 mmol/L    Calcium 8.9 8.6 - 10.5 mg/dL    Total Protein 6.7 6.0 - 8.5 g/dL    Albumin 4.10 3.50 - 5.20 g/dL    ALT (SGPT) 13 1 - 33 U/L    AST (SGOT) 17 1 - 32 U/L    Alkaline Phosphatase 69 39 - 117 U/L    Total Bilirubin 0.2 0.0 - 1.2 mg/dL    Globulin 2.6 gm/dL    A/G Ratio 1.6 g/dL    BUN/Creatinine Ratio 18.4 7.0 - 25.0    Anion Gap 13.0 5.0 - 15.0 mmol/L    eGFR 60.8 >60.0 mL/min/1.73   Protime-INR    Specimen: Blood   Result Value Ref Range    Protime 9.9 9.6 - 11.7 Seconds    INR 0.96 0.93 - 1.10   aPTT    Specimen: Blood   Result Value Ref Range    PTT 28.8 24.0 - 31.0 seconds   Troponin    Specimen: Blood   Result Value Ref Range    Troponin T <0.010 0.000 - 0.030 ng/mL   CBC Auto Differential    Specimen: Blood   Result Value Ref Range    WBC 7.20 3.40 - 10.80 10*3/mm3    RBC 4.52 3.77 - 5.28 10*6/mm3    Hemoglobin 13.7 12.0 - 15.9 g/dL    Hematocrit 41.5 34.0 - 46.6 %    MCV 91.8 79.0 - 97.0 fL    MCH 30.4 26.6 - 33.0 pg    MCHC 33.1 31.5 - 35.7 g/dL    RDW 13.8 12.3 - 15.4 %    RDW-SD 44.2 37.0 - 54.0 fl    MPV 7.6 6.0 - 12.0 fL    Platelets 299 140 - 450 10*3/mm3    Neutrophil % 52.3 42.7 - 76.0 %    Lymphocyte % 35.6 19.6 - 45.3 %    Monocyte % 6.4 5.0 - 12.0 %    Eosinophil % 5.2 0.3 - 6.2 %    Basophil % 0.5 0.0 - 1.5 %    Neutrophils, Absolute 3.80 1.70 - 7.00 10*3/mm3    Lymphocytes, Absolute 2.60 0.70 - 3.10 10*3/mm3    Monocytes, Absolute 0.50 0.10 - 0.90 10*3/mm3    Eosinophils, Absolute 0.40 0.00 - 0.40 10*3/mm3    Basophils, Absolute 0.00 0.00 - 0.20 10*3/mm3    nRBC 0.0 0.0 - 0.2 /100 WBC   POC Glucose Once    Specimen: Blood   Result Value Ref Range    Glucose 102 70 - 105 mg/dL   ECG 12 Lead   Result Value Ref Range    QT Interval 409 ms   Type & Screen    Specimen: Blood   Result Value Ref Range    ABO Type O     RH type Positive     Antibody Screen Negative     T&S Expiration Date 6/9/2022 11:59:59 PM     Green Top (Gel)   Result Value Ref Range    Extra Tube hold    Lavender Top   Result Value Ref Range    Extra Tube hold for add-on    Gold Top - SST   Result Value Ref Range    Extra Tube hold    Light Blue Top   Result Value Ref Range    Extra Tube Hold for add-ons.      CT Angiogram Neck    Result Date: 6/6/2022   1. No large vessel occlusion or acute cerebral thrombus. 2. Variant anatomy as discussed above. 3. Carotid artery atherosclerotic vascular disease with no hemodynamically significant stenosis. 4. Additional vascular and nonvascular findings as noted above.  Electronically Signed By-Rei Smiley MD On:6/6/2022 9:38 PM This report was finalized on [id number] by  Rei Smiley MD.    XR Chest 1 View    Result Date: 6/6/2022  No active disease.  Electronically Signed By-Rei Smiley MD On:6/6/2022 9:28 PM This report was finalized on [id number] by  Rei Smiley MD.    CT Head Without Contrast Stroke Protocol    Result Date: 6/6/2022  IMPRESSION :  1. Volume loss secondary to cerebral atrophy. 2. Suggestion of chronic microvascular ischemia. 3. No acute intracranial findings. 4. Acute and chronic paranasal sinus disease.  Electronically Signed By-Rei Smiley MD On:6/6/2022 8:44 PM This report was finalized on [id number] by  Rei Smiley MD.    CT Angiogram Head w AI Analysis of LVO    Result Date: 6/6/2022   1. No large vessel occlusion or acute cerebral thrombus. 2. Variant anatomy as discussed above. 3. Carotid artery atherosclerotic vascular disease with no hemodynamically significant stenosis. 4. Additional vascular and nonvascular findings as noted above.  Electronically Signed By-Rei Smiley MD On:6/6/2022 9:38 PM This report was finalized on [id number] by  Rei Smiley MD.    CT CEREBRAL PERFUSION WITH & WITHOUT CONTRAST    Result Date: 6/6/2022  Normal study.  Electronically Signed By-Rei Smiley MD On:6/6/2022 9:08 PM This report was finalized on [id number] by  Rei Smiley  MD.    Medications   sodium chloride 0.9 % flush 10 mL (has no administration in time range)   sodium chloride 0.9 % flush 3 mL (3 mL Intravenous Given 6/6/22 2204)   sodium chloride 0.9 % flush 3-10 mL (has no administration in time range)   atorvastatin (LIPITOR) tablet 80 mg (80 mg Oral Given 6/6/22 2204)   bisacodyl (DULCOLAX) suppository 10 mg (has no administration in time range)   sodium chloride 0.9 % infusion (100 mL/hr Intravenous New Bag 6/6/22 2211)   acetaminophen (TYLENOL) tablet 650 mg (has no administration in time range)     Or   acetaminophen (TYLENOL) suppository 650 mg (has no administration in time range)   potassium chloride (K-DUR,KLOR-CON) CR tablet 40 mEq (has no administration in time range)     Or   potassium chloride (KLOR-CON) packet 40 mEq (has no administration in time range)     Or   potassium chloride 10 mEq in 100 mL IVPB (has no administration in time range)   sodium chloride nasal spray 2 spray (has no administration in time range)   iopamidol (ISOVUE-370) 76 % injection 100 mL (100 mL Intravenous Given 6/6/22 2059)   iopamidol (ISOVUE-370) 76 % injection 50 mL (50 mL Intravenous Given 6/6/22 2100)   potassium chloride (K-DUR,KLOR-CON) CR tablet 40 mEq (40 mEq Oral Given 6/6/22 2204)   clopidogrel (PLAVIX) tablet 75 mg (75 mg Oral Given 6/6/22 2204)            EKG my interpretation normal sinus rhythm rate of 76 normal axis hypertrophy no acute elevation QTC of 460 nonspecific T wave changes noted laterally abnormal EKG                                      MDM  Number of Diagnoses or Management Options  TIA (transient ischemic attack): new and requires workup  Diagnosis management comments: Medical decision making.  Patient IV established blood sugar checked was unremarkable a code stroke was initiated neurology notified Dr. bryant.  EKG obtained reviewed by me is a sinus rhythm without acute ST elevation or acute changes otherwise labs obtained reviewed by me COVID-19 negative  chemistries unremarkable potassium 3.2 coags unremarkable troponin negative CBC unremarkable patient underwent a CT head without obtained reviewed by radiology as well as me volume loss and atrophy chronic microvascular ischemia no acute findings acute and chronic paranasal sinus disease.  Patient underwent a chest x-ray obtained reviewed by me was unremarkable as well as radiology.  CT scan perfusion read by radiology is unremarkable and the report was reviewed by me.  CTA of the head and neck: Radiology report no large vessel occlusion or acute cerebral thrombosis.  Variant anatomy carotid artery atherosclerosis but no hemodynamically significant stenosis.  Report reviewed by me.  The patient repeat exam was resting comfortably she is awake alert speech is normal and there is no facial asymmetry and she moves everything without difficulty NIH was still 0.  At this point we talked about the findings she states she cannot take aspirin.  She was given Plavix 75 mg p.o.  She is not a tPA candidate as her symptoms lasted 10 minutes and they have resolved and her NIH is 0.  Is not a interventional candidate and there is no large vessel occlusion.  She voiced understanding I talked her family.  I see no evidence at this time of acute stroke by clinical findings although this is a TIA.  She has a history of ocular migraines but this does not strike me as this I see no evidence of meningitis or encephalitis or acute myocardial infarction.  No other suggestion of acute inflammatory disorder such as arteritis based on her history.  No evidence to suggest dissection.  Not complete list of all possibilities and I think most likely secondary to a TIA.  Hospitalist nurse practitioner notified and neurologist notified stable otherwise unremarkable ER course.       Amount and/or Complexity of Data Reviewed  Clinical lab tests: reviewed  Tests in the radiology section of CPT®: reviewed  Discuss the patient with other providers:  yes    Risk of Complications, Morbidity, and/or Mortality  Presenting problems: high  Diagnostic procedures: high  Management options: high    Patient Progress  Patient progress: stable      Final diagnoses:   TIA (transient ischemic attack)       ED Disposition  ED Disposition     ED Disposition   Decision to Admit    Condition   --    Comment   Level of Care: Telemetry [5]   Diagnosis: TIA (transient ischemic attack) [992155]   Admitting Physician: LATESHA FELIX [[id number]]   Attending Physician: LATESHA FELIX [[id number]]   Bed Request Comments: JABARI               No follow-up provider specified.       Medication List      No changes were made to your prescriptions during this visit.          Pepe Small MD  06/06/22 2864

## 2022-06-07 NOTE — ED NOTES
Pt states she noticed L arm tingling and  stated she was slurring her words. Pt is now alert and oriented and NIH zero

## 2022-06-07 NOTE — H&P
[facility] Medicine Services      Patient Name: Sarah Brannon  : 1960  MRN: [id number]  Primary Care Physician:  Patricia Steele MD  Date of admission: 2022      Subjective      Chief Complaint: Slurred speech    History of Present Illness: Sarah Brannon is a very pleasant 62 y.o. female who presented to [facility] on 2022 complaining of a sudden episode of confusion, slurred speech and left arm numbness tingling and weakness at 7:45 PM tonight.  Patient denies any headache, visual disturbances, chest pain, dyspnea, nausea, or fever.  She does report a sinus infection and some dizziness..   at bedside assisting with history.  He reports the slurred speech and confusion and left arm weakness lasted about 10 minutes and has totally resolved before she arrived to the emergency department.  NIH is currently 0.  She has a past medical history of hypertension hyperlipidemia, migraines depression and seasonal allergies.  Findings today in emergency department include:K 3.2, Cr 1.03, CXR per radiology no acute cardiopulmonary process, EKS sinus rhythm, ,     CT head per radiology  1. Volume loss secondary to cerebral atrophy.  2. Suggestion of chronic microvascular ischemia.  3. No acute intracranial findings.  4. Acute and chronic paranasal sinus disease.     CT head perfusion  Normal study.     CTA head and neck  1. No large vessel occlusion or acute cerebral thrombus.  2. Variant anatomy as discussed above.  3. Carotid artery atherosclerotic vascular disease with no  hemodynamically significant stenosis.  4. Additional vascular and nonvascular findings as noted above.    Neurology was consulted from the emergency department and she will be admitted for further evaluation and treatment.  2D echo in a.m. ordered, MRI brain ordered.  Patient does have a history of a total knee replacement unclear if MRI compatible.  She reports an  intolerance to aspirin (stomach upset) and was given Plavix in the emergency department.  She is on home statin.    Review of Systems   Constitutional: Negative.   HENT: Positive for congestion.    Eyes: Negative.    Cardiovascular: Negative.    Respiratory: Negative.    Endocrine: Negative.    Hematologic/Lymphatic: Negative.    Skin: Negative.    Musculoskeletal: Positive for arthritis.   Gastrointestinal: Negative.    Genitourinary: Negative.    Neurological: Positive for difficulty with concentration, light-headedness, numbness and paresthesias.   Psychiatric/Behavioral: Positive for depression.   Allergic/Immunologic: Negative.    All other systems reviewed and are negative.       Personal History     Past Medical History:   Diagnosis Date   • Arthritis    • Baker's cyst of knee     RIGHT   • Depression    • Hiatal hernia    • History of breast augmentation 04/07/2020   • History of palpitations    • Hyperlipemia    • Hypertension    • Migraine    • PONV (postoperative nausea and vomiting)     SLOW TO WAKE   • Seasonal allergies        Past Surgical History:   Procedure Laterality Date   • BREAST AUGMENTATION Bilateral 06/2020   • ENDOMETRIAL ABLATION      menustrual   • FOOT SURGERY      rt and lt foot   • LEEP     • MOLE REMOVAL     • NASAL SEPTUM SURGERY     • TOTAL KNEE ARTHROPLASTY Right 8/23/2016    Procedure: RT TOTAL KNEE ARTHROPLASTY WITH ROBERTO NAVIGATION, depuy;  Surgeon: Hakan Mccann MD;  Location: [facility];  Service:        Family History: family history includes Heart disease in her father and mother. Otherwise pertinent FHx was reviewed and not pertinent to current issue.    Social History:  reports that she has never smoked. She has never used smokeless tobacco. She reports that she does not drink alcohol and does not use drugs.    Home Medications:  Prior to Admission Medications     Prescriptions Last Dose Informant Patient Reported? Taking?    acyclovir (ZOVIRAX) 800 MG tablet   No  No    TAKE 1/2 TABLET BY MOUTH ONE TIME A DAY     buPROPion XL (WELLBUTRIN XL) 300 MG 24 hr tablet  Medication Bottle Yes No    Take 300 mg by mouth every morning.    cetirizine (zyrTEC) 10 MG tablet   No No    TAKE 1 TABLET BY MOUTH EVERY DAY     Cholecalciferol 1.25 MG (46689 UT) tablet   No No    Take 1 tablet by mouth 1 (One) Time Per Week.    clobetasol (TEMOVATE) 0.05 % cream   No No    Apply  topically to the appropriate area as directed 2 (Two) Times a Day.    metoprolol succinate XL (TOPROL-XL) 50 MG 24 hr tablet   No No    TAKE 1 TABLET BY MOUTH EVERY DAY    potassium chloride (KLOR-CON) 10 MEQ CR tablet   No No    Take 1 tablet by mouth Daily.    pravastatin (PRAVACHOL) 80 MG tablet   No No    TAKE 1 TABLET BY MOUTH AT BEDTIME    tiZANidine (ZANAFLEX) 4 MG tablet   No No    Take 1 tablet by mouth At Night As Needed for Muscle Spasms.    triamterene-hydrochlorothiazide (MAXZIDE) 75-50 MG per tablet   No No    TAKE 1 TABLET BY MOUTH EVERY DAY    venlafaxine XR (EFFEXOR-XR) 75 MG 24 hr capsule   Yes No    Take 225 mg by mouth Daily.            Allergies:  Allergies   Allergen Reactions   • Bee Venom Swelling   • Shellfish-Derived Products Swelling     Lips became swollen.       Objective      Vitals:   Temp:  [98.2 °F (36.8 °C)] 98.2 °F (36.8 °C)  Heart Rate:  [70-93] 70  Resp:  [12-21] 21  BP: (120-127)/(64-82) 126/75    Physical Exam  Vitals reviewed.   Constitutional:       Appearance: Normal appearance. She is obese.   HENT:      Head: Normocephalic and atraumatic.      Right Ear: External ear normal.      Left Ear: External ear normal.      Nose: Nose normal.      Mouth/Throat:      Mouth: Mucous membranes are moist.   Eyes:      Extraocular Movements: Extraocular movements intact.   Cardiovascular:      Rate and Rhythm: Normal rate and regular rhythm.      Pulses: Normal pulses.      Heart sounds: Normal heart sounds.   Pulmonary:      Effort: Pulmonary effort is normal.      Breath sounds: Normal  breath sounds.   Abdominal:      Palpations: Abdomen is soft.   Genitourinary:     Comments: deferred  Musculoskeletal:         General: Normal range of motion.      Cervical back: Normal range of motion and neck supple.   Skin:     General: Skin is warm and dry.   Neurological:      General: No focal deficit present.      Mental Status: She is alert and oriented to person, place, and time. Mental status is at baseline.   Psychiatric:         Mood and Affect: Mood normal.         Behavior: Behavior normal.         Thought Content: Thought content normal.         Judgment: Judgment normal.         Result Review    Result Review:  I have personally reviewed the results from the time of this admission to 6/7/2022 00:06 EDT and agree with these findings:  [x]  Laboratory  []  Microbiology  [x]  Radiology  [x]  EKG/Telemetry   []  Cardiology/Vascular   []  Pathology  []  Old records  []  Other:  Most notable findings include: K 3.2, Cr 1.03, CXR per radiology no acute cardiopulmonary process, EKS sinus rhythm, , troponin less than 0.010     CT head per radiology  1. Volume loss secondary to cerebral atrophy.  2. Suggestion of chronic microvascular ischemia.  3. No acute intracranial findings.  4. Acute and chronic paranasal sinus disease.     CT head perfusion  Normal study.     CTA head and neck  1. No large vessel occlusion or acute cerebral thrombus.  2. Variant anatomy as discussed above.  3. Carotid artery atherosclerotic vascular disease with no  hemodynamically significant stenosis.  4. Additional vascular and nonvascular findings as noted above.    Assessment & Plan        Active Hospital Problems:  Active Hospital Problems    Diagnosis    • TIA (transient ischemic attack)    • Hypokalemia    • Sinusitis    • Hyperlipidemia    • Hypertension    • Obesity (BMI 30.0-34.9)    • Migraine headache      Plan:    Slurred speech confusion left arm numbness tingling and weakness resolved NIH 0, CT head CTA head and  neck CT perfusion scan negative for acute per radiology, possible TIA, refused aspirin due to intolerance given Plavix in ED on statin, TIA stroke orders in place, neurology consulted 2D echo in a.m. MRI brain in a.m. unclear if compatible given TKR    Hypokalemia potassium 3.2,, replacement protocol in place repeat BMP in a.m.    Acute sinusitis per CT and patient complaints of nasal congestion, WBC not elevated likely viral, procalcitonin in a.m., Ocean nasal spray every hour as needed    Hyperlipidemia, on statin    Hypertension Home meds unverified at this time reorder pending verification from pharmacy currently controlled monitor BP will add antihypertensives as needed as needed until meds verified    Obesity BMI 33.39 lifestyle management education    Past medical history migraine headaches, noted no home meds    Depression, home meds unverified at this time reorder pending verification from pharmacy      DVT prophylaxis:  Mechanical DVT prophylaxis orders are present.    CODE STATUS:    Code Status (Patient has no pulse and is not breathing): CPR (Attempt to Resuscitate)  Medical Interventions (Patient has pulse or is breathing): Full Support    Admission Status:  I believe this patient meets observation status.    I discussed the patient's findings and my recommendations with patient and family.    This patient has been examined wearing appropriate Personal Protective Equipment . 06/07/22      Signature: Electronically signed by JULES Bunn, 06/07/22, 12:10 AM EDT.

## 2022-06-07 NOTE — PROGRESS NOTES
[facility] Daily Progress Note    Patient Name: Sarah Brannon  : 1960  MRN: [id number]  Primary Care Physician:  Patricia Steele MD  Date of admission: 2022      Subjective      Chief Complaint: Transient loss of speech and electric-like feelings through her arms.    Review of Systems   Constitutional: Negative.   HENT: Negative.    Eyes: Negative.    Cardiovascular: Negative.    Respiratory: Negative.    Endocrine: Negative.    Hematologic/Lymphatic: Negative.    Skin: Negative.    Musculoskeletal: Negative.    Gastrointestinal: Negative.    Genitourinary: Negative.    Neurological: Positive for difficulty with concentration and paresthesias.        Slurred and garbled speech which has resolved   Psychiatric/Behavioral: Negative.    Allergic/Immunologic: Negative.    All other systems reviewed and are negative.         Objective      Vitals:   Temp:  [98.2 °F (36.8 °C)-98.6 °F (37 °C)] 98.6 °F (37 °C)  Heart Rate:  [70-93] 73  Resp:  [11-21] 11  BP: (120-140)/(64-82) 140/65    Physical Exam  Vitals and nursing note reviewed.   Constitutional:       General: She is not in acute distress.     Appearance: Normal appearance. She is well-developed. She is not ill-appearing, toxic-appearing or diaphoretic.   HENT:      Head: Normocephalic and atraumatic.      Right Ear: Ear canal and external ear normal.      Left Ear: Ear canal and external ear normal.      Nose: Nose normal. No congestion or rhinorrhea.      Mouth/Throat:      Mouth: Mucous membranes are moist.      Pharynx: No oropharyngeal exudate.   Eyes:      General: No scleral icterus.        Right eye: No discharge.         Left eye: No discharge.      Extraocular Movements: Extraocular movements intact.      Conjunctiva/sclera: Conjunctivae normal.      Pupils: Pupils are equal, round, and reactive to light.   Neck:      Thyroid: No thyromegaly.      Vascular: No carotid bruit or JVD.       Trachea: No tracheal deviation.   Cardiovascular:      Rate and Rhythm: Normal rate and regular rhythm.      Pulses: Normal pulses.      Heart sounds: Normal heart sounds. No murmur heard.    No friction rub. No gallop.   Pulmonary:      Effort: Pulmonary effort is normal. No respiratory distress.      Breath sounds: Normal breath sounds. No stridor. No wheezing, rhonchi or rales.   Chest:      Chest wall: No tenderness.   Abdominal:      General: Bowel sounds are normal. There is no distension.      Palpations: Abdomen is soft. There is no mass.      Tenderness: There is no abdominal tenderness. There is no guarding or rebound.      Hernia: No hernia is present.   Musculoskeletal:         General: No swelling, tenderness, deformity or signs of injury. Normal range of motion.      Cervical back: Normal range of motion and neck supple. No rigidity. No muscular tenderness.      Right lower leg: No edema.      Left lower leg: No edema.   Lymphadenopathy:      Cervical: No cervical adenopathy.   Skin:     General: Skin is warm and dry.      Coloration: Skin is not jaundiced or pale.      Findings: No bruising, erythema or rash.   Neurological:      General: No focal deficit present.      Mental Status: She is alert and oriented to person, place, and time. Mental status is at baseline.      Cranial Nerves: No cranial nerve deficit.      Sensory: No sensory deficit.      Motor: No weakness or abnormal muscle tone.      Coordination: Coordination normal.   Psychiatric:         Mood and Affect: Mood normal.         Behavior: Behavior normal.         Thought Content: Thought content normal.         Judgment: Judgment normal.             Result Review    Result Review:  I have personally reviewed the results from the time of this admission to 6/7/2022 13:33 EDT and agree with these findings:  [x]  Laboratory  [x]  Microbiology  [x]  Radiology  []  EKG/Telemetry   []  Cardiology/Vascular   []  Pathology  []  Old records  []   Other:  Most notable findings include:           Assessment & Plan      Brief Patient Summary:  Sarah Brannon is a 62 y.o. female who was admitted last night after an episode of slurred speech and trouble communicating between herself and her .  The patient also felt electrical shocks she says going from her elbows down into her fingers.  She is never had this before, and thankfully it has dissipated.  Her  brought her to the hospital immediately where a code stroke was called and neurology evaluated her.  All of her symptoms at this point, have resolved.      atorvastatin, 40 mg, Oral, Nightly  buPROPion XL, 300 mg, Oral, Daily  cetirizine, 10 mg, Oral, Daily  clobetasol, , Topical, BID  metoprolol succinate XL, 50 mg, Oral, Daily  sodium chloride, 3 mL, Intravenous, Q12H  venlafaxine XR, 225 mg, Oral, Daily       sodium chloride, 100 mL/hr, Last Rate: Stopped (06/07/22 1221)         Active Hospital Problems:  Active Hospital Problems    Diagnosis    • Sinusitis    • TIA (transient ischemic attack)    • Hypokalemia    • Hyperlipidemia    • Hypertension    • Obesity (BMI 30.0-34.9)    • Migraine headache      Plan:   Slurred speech confusion left arm numbness tingling and weakness resolved NIH 0, CT head CTA head and neck CT perfusion scan negative for acute per radiology, possible TIA, refused aspirin due to intolerance given Plavix in ED on statin, TIA stroke orders in place, neurology consulted 2D echo in a.m. MRI brain in a.m. unclear if compatible given TKR  -We will ask cardiology to see for MINOO and an event monitor.  -She has been seen by neurology who agrees with the plan for cardiology to weigh in at this time to as it may be cardiac in etiology affecting her neurologic status.    Hypokalemia potassium 3.2,, replacement protocol in place repeat BMP in a.m.     Acute sinusitis per CT and patient complaints of nasal congestion, WBC not elevated likely viral, procalcitonin in a.m., Power  nasal spray every hour as needed     Hyperlipidemia, on statin     Hypertension Home meds unverified at this time reorder pending verification from pharmacy currently controlled monitor BP will add antihypertensives as needed as needed until meds verified     Obesity BMI 33.39 lifestyle management education     Past medical history migraine headaches, noted no home meds     Depression, home meds unverified at this time reorder pending verification from pharmacy       DVT prophylaxis:  Mechanical DVT prophylaxis orders are present.    CODE STATUS:    Code Status (Patient has no pulse and is not breathing): CPR (Attempt to Resuscitate)  Medical Interventions (Patient has pulse or is breathing): Full Support      Disposition:  I expect patient to be discharged when her work-up is complete.    This patient has been examined wearing appropriate Personal Protective Equipment and discussed with hospital infection control department. 06/07/22      Electronically signed by Teetee Thurston MD, 06/07/22, 13:33 EDT.  [facility] Hospitalist Team

## 2022-06-07 NOTE — CASE MANAGEMENT/SOCIAL WORK
Discharge Planning Assessment  [facility]     Patient Name: Sarah Brannon  MRN: [id number]  Today's Date: 6/7/2022    Admit Date: 6/6/2022     Discharge Needs Assessment     Row Name 06/07/22 135       Living Environment    People in Home child(ela), dependent;spouse    Name(s) of People in Home David (spouse)    Current Living Arrangements home    Primary Care Provided by self    Provides Primary Care For no one    Family Caregiver if Needed spouse    Quality of Family Relationships helpful;involved;supportive    Able to Return to Prior Arrangements yes    Living Arrangement Comments Lives w/spouse & son.       Resource/Environmental Concerns    Resource/Environmental Concerns none    Transportation Concerns none       Transition Planning    Patient/Family Anticipates Transition to home with family    Patient/Family Anticipated Services at Transition none    Transportation Anticipated family or friend will provide       Discharge Needs Assessment    Readmission Within the Last 30 Days no previous admission in last 30 days    Equipment Currently Used at Home none    Concerns to be Addressed discharge planning    Anticipated Changes Related to Illness none    Equipment Needed After Discharge none    Provided Post Acute Provider List? N/A    Provided Post Acute Provider Quality & Resource List? N/A               Discharge Plan     Row Name 06/07/22 9787       Plan    Plan Anticipates home. Lives w/spouse & son.    Patient/Family in Agreement with Plan yes    Plan Comments CM to patient's room to complete initial assessment. Patient lives w/spouse & son. Spouse to transport at discharge. No DME. IADL. Patient drives & is retired. Home pharmacy is [facility], but is agreeable to Meds to Bed. CM updated pharmacy.              Continued Care and Services - Admitted Since 6/6/2022    Coordination has not been started for this encounter.       Expected Discharge Date and Time     Expected Discharge Date Expected Discharge  Time    Jun 9, 2022          Demographic Summary     Row Name 06/07/22 1350       General Information    Admission Type observation    Arrived From emergency department    Required Notices Provided Observation Status Notice    Referral Source emergency department    Reason for Consult discharge planning    Preferred Language English       Contact Information    Permission Granted to Share Info With                Functional Status     Row Name 06/07/22 1351       Functional Status    Usual Activity Tolerance good    Current Activity Tolerance good       Functional Status, IADL    Medications independent    Meal Preparation independent    Housekeeping independent    Laundry independent    Shopping independent       Mental Status    General Appearance WDL WDL       Mental Status Summary    Recent Changes in Mental Status/Cognitive Functioning no changes       Employment/    Employment Status retired              Met with patient in room wearing PPE: mask, face shield/goggles, gloves, gown.      Maintained distance greater than six feet and spent less than 15 minutes in the room.    Autumn Locke RN, MSN  Care Manager  [phone]

## 2022-06-07 NOTE — PLAN OF CARE
Goal Outcome Evaluation:      63 y/o F who came to the hospital after an episode of confusion, slurred speech and left arm numbness and weakness. At baseline, pt lives with  and dependent son in one story home with 1 step upon entry. Pt independent with ADLs and drives. This date, pt denies any symptoms, independent with bed mobility and transitioning to stand from EOB. Ambulation of 220 ft with SBA and no AD. Vital sign WNL. Due to pts ability to function at baseline, anticipate d/c routine home.

## 2022-06-07 NOTE — THERAPY EVALUATION
Acute Care - Speech Language Pathology Initial Evaluation   Kranthi     Patient Name: Sarah Brannon  : 1960  MRN: [id number]  Today's Date: 2022               Admit Date: 2022     Visit Dx:    ICD-10-CM ICD-9-CM   1. TIA (transient ischemic attack)  G45.9 435.9     Patient Active Problem List   Diagnosis   • Primary osteoarthritis of right knee   • Status post right knee replacement   • Hx of total knee arthroplasty   • History of total knee arthroplasty   • Allergic rhinitis   • Anaclitic depression   • Anemia   • Breast hypertrophy   • Bunion   • Edema   • Encounter for general adult medical examination without abnormal findings   • Gout   • Hyperlipidemia   • Hypertension   • Macromastia   • Mastodynia   • Migraine headache   • Obesity (BMI 30.0-34.9)   • Presbyopia   • Vitamin D deficiency   • Intrinsic eczema   • TIA (transient ischemic attack)   • Hypokalemia   • Sinusitis     Past Medical History:   Diagnosis Date   • Arthritis    • Baker's cyst of knee     RIGHT   • Depression    • Hiatal hernia    • History of breast augmentation 2020   • History of palpitations    • Hyperlipemia    • Hypertension    • Migraine    • PONV (postoperative nausea and vomiting)     SLOW TO WAKE   • Seasonal allergies      Past Surgical History:   Procedure Laterality Date   • BREAST AUGMENTATION Bilateral 2020   • ENDOMETRIAL ABLATION      menustrual   • FOOT SURGERY      rt and lt foot   • LEEP     • MOLE REMOVAL     • NASAL SEPTUM SURGERY     • TOTAL KNEE ARTHROPLASTY Right 2016    Procedure: RT TOTAL KNEE ARTHROPLASTY WITH ROBERTO NAVIGATION, depuy;  Surgeon: Hakan Mccann MD;  Location: [facility] OR;  Service:        SLP Recommendation and Plan  Pt appears returned to baseline function. Discussed exercises, strategies for word finding and dysarthria. Further educated on SLP scope of practice, availability for OP referral if difficulties arise. Both verbalize understanding.   Will sign  off as no acute needs are identified.       SLP EVALUATION (last 72 hours)     SLP SLC Evaluation     Row Name 06/07/22 1600       Communication Assessment/Intervention    Document Type evaluation  -AB    Subjective Information no complaints  -AB    Patient Observations alert;cooperative;agree to therapy  -AB    Patient/Family/Caregiver Comments/Observations Pt cooperative and alert, upright in chair self feding lunch. Spouse is at bedside.  -AB    Patient Effort good  -AB    Symptoms Noted During/After Treatment none  -AB            General Information    Patient Profile Reviewed yes  -AB    Pertinent History Of Current Problem 62 y.o. female who presented to [facility] on 6/6/2022 complaining of a sudden episode of confusion, slurred speech and left arm numbness tingling and weakness at 7:45 PM tonight.  Patient denies any headache, visual disturbances, chest pain, dyspnea, nausea, or fever.  She does report a sinus infection and some dizziness..   at bedside assisting with history.  He reports the slurred speech and confusion and left arm weakness lasted about 10 minutes and has totally resolved before she arrived to the emergency department.  NIH is currently 0.  She has a past medical history of hypertension hyperlipidemia, migraines depression and seasonal allergies. MRI positive  for R parietal acute/subacute infarct.  -AB    Precautions/Limitations, Vision WFL  -AB    Precautions/Limitations, Hearing WFL  -AB    Prior Level of Function-Communication WFL  -AB    Plans/Goals Discussed with patient;spouse/S.O.;agreed upon  -AB    Barriers to Rehab none identified  -AB    Standardized Assessment Used --  informal measures  -AB            Pain    Additional Documentation Pain Scale: Numbers Pre/Post-Treatment (Group)  -AB            Pain Scale: Numbers Pre/Post-Treatment    Pretreatment Pain Rating 0/10 - no pain  -AB    Posttreatment Pain Rating 0/10 - no pain  -AB            Comprehension  Assessment/Intervention    Comprehension Assessment/Intervention Auditory Comprehension  -AB            Auditory Comprehension Assessment/Intervention    Auditory Comprehension (Communication) WNL  -AB    Able to Identify Objects/Pictures (Communication) WNL  -AB    Answers Questions (Communication) WFL  -AB    Able to Follow Commands (Communication) WFL  -AB    Auditory Comprehension Communication, Comment Auditory comprehension intact, can follow all commands and respond appropriately to all yes/no and wh- questions.  -AB            Expression Assessment/Intervention    Expression Assessment/Intervention verbal expression  -AB            Verbal Expression Assessment/Intervention    Verbal Expression WNL  -AB    Sentence Formulation WNL  -AB    Conversational Discourse/Fluency WNL  -AB    Verbal Expression, Comment Expressive language intact. Pt endorses intermittent word finding difficulties with low frequency words. Discussed strategies for word finding and aging process versus abnormal characteristics. Pt and spouse verbalize understanding.  -AB            Oral Motor Structure and Function    Oral Motor Structure and Function WFL  -AB    Oral Lesions or Structural Abnormalities and/or variants none  -AB    Dentition Assessment natural, present and adequate  -AB    Mucosal Quality moist, healthy  -AB            Oral Musculature and Cranial Nerve Assessment    Oral Motor General Assessment WFL  -AB    Oral Motor, Comment Question mild L sided labial weakness, however adequate ROM. Discussed and demonstrated ROM exercises for labial and lingual ROM.  -AB            Motor Speech Assessment/Intervention    Motor Speech Function WFL  -AB    Motor Speech, Comment Pt reports complaints of motor speech have resolved. Speech at conversation level 100% intelligible with no dysarthria or apraxia characteristics - no articulatory imprecision, adequate intensity, rate, quality. Discussed strategies for dysarthria and symptoms  may reoccur with fatigue, stress, acute illness.  -AB            Cognitive Assessment Intervention- SLP    Cognitive Function (Cognition) WNL  -AB    Orientation Status (Cognition) WNL  -AB    Memory (Cognitive) WFL  -AB    Attention (Cognitive) WNL  -AB    Problem Solving (Cognitive) WNL  -AB    Cognition, Comment Cognition intact for functional recall of hospitalization course. Reports of word finding addressed as above.  -AB            SLP Evaluation Clinical Impressions    SLP Diagnosis Functional cognitive linguistic skills  -AB    SLC Criteria for Skilled Therapy Interventions Met baseline status;no problems identified which require skilled intervention  -AB    Functional Impact no impact on function  -AB            SLP Treatment Clinical Impressions    Care Plan Review evaluation/treatment results reviewed;care plan/treatment goals reviewed;risks/benefits reviewed;current/potential barriers reviewed;patient/other agree to care plan  -AB    Care Plan Review, Other Participant(s) spouse  -AB            Recommendations    Therapy Frequency (SLP SLC) evaluation only  -AB    Anticipated Discharge Disposition (SLP) home  -AB          User Key  (r) = Recorded By, (t) = Taken By, (c) = Cosigned By    Initials Name Effective Dates    AB Violeta Stanley, MS CCC-SLP 08/01/21 -                    EDUCATION  The patient has been educated in the following areas:     Cognitive Impairment Communication Impairment.                      Time Calculation:         Therapy Charges for Today     Code Description Service Date Service Provider Modifiers Qty    [id number] HC ST EVAL SPEECH AND PROD W LANG  5 6/7/2022 Violeta Stanley, MS CCC-SLP GN 1                 PPE  Patient was not wearing a face mask during this therapy encounter. Therapist used appropriate personal protective equipment including mask, eye protection and gloves.  Mask used was standard procedure mask. Appropriate PPE was worn during the entire therapy  session. The patient did not cough during this evaluation. Therapist was within 6 feet for 15 minutes or more during the evaluation. Hand hygiene was completed before and after therapy session. Patient is not in enhanced droplet precautions.         Violeta Stanley MS CCC-SLP  6/7/2022

## 2022-06-07 NOTE — CONSULTS
Primary Care Provider: Patricia Steele*     Consult requested by: Hospitalist admitting team    Reason for Consultation: Neurological evaluation/code stroke    History taken from: patient chart RN    Chief complaint: Transient left side numbness and possible weakness     SUBJECTIVE:    History of present illness: Background per H&P: Sarah Brannon is a 62 y.o. year old female who was evaluated in room 270 at [facility]    Source of information is the patient and the medical records    The patient reports that she was in her usual state of health and she was gluing something and suddenly she realized that she could not do much of the stuff with the left upper extremity she said that it felt numb initially but then she could not really control it.  There was no seizure-like activity.  Her  saw her and reported to her that she was talking strange.  The patient states that she to herself was talking fine but her  definitely noted something different.  It concerned them and they decided to come to the hospital and by the time she was in the car all the symptoms had resolved    So when she presented to the ER her NIH was 0  She had normal CT, CTA and also CTP was done    She does not take aspirin    Incidentally her MRI shows a right parietal infarct and looking at it it looks cortical  Though asymptomatic but she needs cardiac work-up and possible anticoagulation if cardiac sources are found.  My recommendation is MINOO and event monitor or loop recorder    No headaches or migraines or seizures    As per admitting, Sarah Brannon is a very pleasant 62 y.o. female who presented to [facility] on 6/6/2022 complaining of a sudden episode of confusion, slurred speech and left arm numbness tingling and weakness at 7:45 PM tonight.  Patient denies any headache, visual disturbances, chest pain, dyspnea, nausea, or fever.  She does report a sinus infection and some dizziness..    at bedside assisting with history.  He reports the slurred speech and confusion and left arm weakness lasted about 10 minutes and has totally resolved before she arrived to the emergency department.  NIH is currently 0.  She has a past medical history of hypertension hyperlipidemia, migraines depression and seasonal allergies.  Findings today in emergency department include:K 3.2, Cr 1.03, CXR per radiology no acute cardiopulmonary process, EKS sinus rhythm, ,      CT head per radiology  1. Volume loss secondary to cerebral atrophy.  2. Suggestion of chronic microvascular ischemia.  3. No acute intracranial findings.  4. Acute and chronic paranasal sinus disease.     CT head perfusion  Normal study.     CTA head and neck  1. No large vessel occlusion or acute cerebral thrombus.  2. Variant anatomy as discussed above.  3. Carotid artery atherosclerotic vascular disease with no  hemodynamically significant stenosis.  4. Additional vascular and nonvascular findings as noted above.     Neurology was consulted from the emergency department and she will be admitted for further evaluation and treatment.  2D echo in a.m. ordered, MRI brain ordered.  Patient does have a history of a total knee replacement unclear if MRI compatible.  She reports an intolerance to aspirin (stomach upset) and was given Plavix in the emergency department.  She is on home statin.  - Portions of the above HPI were copied from previous encounters and edited as appropriate. PMH as detailed below.     Review of Systems   No fever chills rigors or sweats  No weight issues  No sleep problems  HEENT:  No speech problem, vision changes, facial asymmetry or pain, or neck problem  Chest: No chest pain, clubbing, cyanosis, orthopnea palpitations  Pulmonary:  No shortness of air, cough or expectoration  Abdomen:  No swelling/tension, constipation,diarrhea or pain  No genitourinary symptoms  Extremity problems as discussed  No back problem  No  psychotic issues  Neurologic issues as discussed  No hematologic, dermatologic or endocrine problems        PATIENT HISTORY:  Past Medical History:   Diagnosis Date   • Arthritis    • Baker's cyst of knee     RIGHT   • Depression    • Hiatal hernia    • History of breast augmentation 04/07/2020   • History of palpitations    • Hyperlipemia    • Hypertension    • Migraine    • PONV (postoperative nausea and vomiting)     SLOW TO WAKE   • Seasonal allergies    ,   Past Surgical History:   Procedure Laterality Date   • BREAST AUGMENTATION Bilateral 06/2020   • ENDOMETRIAL ABLATION      menustrual   • FOOT SURGERY      rt and lt foot   • LEEP     • MOLE REMOVAL     • NASAL SEPTUM SURGERY     • TOTAL KNEE ARTHROPLASTY Right 8/23/2016    Procedure: RT TOTAL KNEE ARTHROPLASTY WITH ROBERTO NAVIGATION, depuy;  Surgeon: Hakan Mccann MD;  Location: [facility] OR;  Service:    ,   Family History   Problem Relation Age of Onset   • Heart disease Mother    • Heart disease Father    ,   Social History     Tobacco Use   • Smoking status: Never Smoker   • Smokeless tobacco: Never Used   Vaping Use   • Vaping Use: Never used   Substance Use Topics   • Alcohol use: No   • Drug use: No   ,   Prior to Admission medications    Medication Sig Start Date End Date Taking? Authorizing Provider   buPROPion XL (WELLBUTRIN XL) 300 MG 24 hr tablet Take 300 mg by mouth every morning. 7/25/16  Yes Provider, MD Jody   cetirizine (zyrTEC) 10 MG tablet TAKE 1 TABLET BY MOUTH EVERY DAY  9/22/21  Yes Patricia Steele MD   clobetasol (TEMOVATE) 0.05 % cream Apply  topically to the appropriate area as directed 2 (Two) Times a Day. 4/15/20  Yes Patricia Steele MD   metoprolol succinate XL (TOPROL-XL) 50 MG 24 hr tablet TAKE 1 TABLET BY MOUTH EVERY DAY 4/22/22  Yes Patricia Steele MD   pravastatin (PRAVACHOL) 80 MG tablet TAKE 1 TABLET BY MOUTH AT BEDTIME 4/22/22  Yes Patricia Steele MD   tiZANidine  (ZANAFLEX) 4 MG tablet Take 1 tablet by mouth At Night As Needed for Muscle Spasms. 3/9/21  Yes Patricia Steele MD   triamterene-hydrochlorothiazide (MAXZIDE) 75-50 MG per tablet TAKE 1 TABLET BY MOUTH EVERY DAY 4/22/22  Yes Patricia Steele MD   venlafaxine XR (EFFEXOR-XR) 75 MG 24 hr capsule Take 225 mg by mouth Daily. 4/22/22  Yes ProviderJody MD   acyclovir (ZOVIRAX) 800 MG tablet TAKE 1/2 TABLET BY MOUTH ONE TIME A DAY  7/6/21 6/7/22 Yes Patricia Steele MD   Cholecalciferol 1.25 MG (52053 UT) tablet Take 1 tablet by mouth 1 (One) Time Per Week. 5/25/22 6/7/22  Patricia Steele MD   potassium chloride (KLOR-CON) 10 MEQ CR tablet Take 1 tablet by mouth Daily. 9/20/21 6/7/22  Patricia Steele MD    Allergies:  Bee venom and Shellfish-derived products    Current Facility-Administered Medications   Medication Dose Route Frequency Provider Last Rate Last Admin   • acetaminophen (TYLENOL) tablet 650 mg  650 mg Oral Q4H PRN Noelle Medley APRN        Or   • acetaminophen (TYLENOL) suppository 650 mg  650 mg Rectal Q4H PRN Noelle Medley APRN       • atorvastatin (LIPITOR) tablet 20 mg  20 mg Oral Nightly Teetee Thurston MD       • bisacodyl (DULCOLAX) suppository 10 mg  10 mg Rectal Daily PRN Noelle Medley APRN       • buPROPion XL (WELLBUTRIN XL) 24 hr tablet 300 mg  300 mg Oral Daily Teetee Thurston MD   300 mg at 06/07/22 0951   • cetirizine (zyrTEC) tablet 10 mg  10 mg Oral Daily Teetee Thurston MD   10 mg at 06/07/22 0952   • clobetasol (TEMOVATE) 0.05 % cream   Topical BID Teetee Thurston MD   Given at 06/07/22 0952   • metoprolol succinate XL (TOPROL-XL) 24 hr tablet 50 mg  50 mg Oral Daily Teetee Thurston MD   50 mg at 06/07/22 0951   • potassium chloride (K-DUR,KLOR-CON) CR tablet 40 mEq  40 mEq Oral PRN Noelle Medley APRN        Or   • potassium chloride (KLOR-CON) packet 40 mEq  40 mEq Oral PRN Noelle Medley,  APRN        Or   • potassium chloride 10 mEq in 100 mL IVPB  10 mEq Intravenous Q1H PRN Noelle Medley APRN       • sodium chloride 0.9 % flush 10 mL  10 mL Intravenous PRN Pepe Small MD       • sodium chloride 0.9 % flush 3 mL  3 mL Intravenous Q12H Noelle Medley APRN   3 mL at 06/07/22 0950   • sodium chloride 0.9 % flush 3-10 mL  3-10 mL Intravenous PRN Noelle Medley APRN       • sodium chloride 0.9 % infusion  100 mL/hr Intravenous Continuous Noelle Medley APRN 100 mL/hr at 06/06/22 2211 100 mL/hr at 06/06/22 2211   • sodium chloride nasal spray 2 spray  2 spray Each Nare PRN Noelle Medley APRN       • tiZANidine (ZANAFLEX) tablet 4 mg  4 mg Oral Nightly PRN Teetee Thurston MD       • venlafaxine XR (EFFEXOR-XR) 24 hr capsule 225 mg  225 mg Oral Daily Teetee Thurston MD   225 mg at 06/07/22 0951        ________________________________________________________        OBJECTIVE:  Upon today's exam, the patient resting comfortably in bed in no acute distress  Neurologic Exam    The patient is awake and alert and oriented x3     Cranial nerve examination demonstrate:  Full fields of vision to confrontation  Pupils are round, reactive to light and accommodation and size of about 3 mm  No ptosis or nystagmus  Funduscopic examination was not successful  Eye movements are conjugate     Sensation on the face and scalp are normal  Muscles of mastication are normal and symmetric  Muscles of  facial expression are normal and symmetric  Hearing is intact bilaterally  Head turning and shoulder shrugs were unremarkable  Tongue was midline  I could not visualize her oropharynx or uvula     Motor examination:  Normal bulk, tone and strength was 5/5  No fasciculations     Sensory examination:  Intact for soft touch, pain and position sensation  Romberg was not evaluated     Reflexes:  0/4     Coordination:  Normal finger-to-nose to finger, rapid alternating movements and toe to  finger     Gait:  Deferred     Toe signs:  [place]    ________________________________________________________   RESULTS REVIEW:    VITAL SIGNS:   Temp:  [98.2 °F (36.8 °C)-98.6 °F (37 °C)] 98.6 °F (37 °C)  Heart Rate:  [70-93] 73  Resp:  [11-21] 11  BP: (120-140)/(64-82) 140/65     LABS:      Lab 06/07/22  0344 06/06/22 2045   WBC 7.20 7.20   HEMOGLOBIN 12.0 13.7   HEMATOCRIT 35.8 41.5   PLATELETS 280 299   NEUTROS ABS  --  3.80   LYMPHS ABS  --  2.60   MONOS ABS  --  0.50   EOS ABS  --  0.40   MCV 92.3 91.8   PROCALCITONIN 0.06  --    PROTIME  --  9.9   APTT  --  28.8         Lab 06/07/22  0344 06/06/22 2045   SODIUM 139 137   POTASSIUM 3.6 3.2*   CHLORIDE 102 98   CO2 28.0 26.0   ANION GAP 9.0 13.0   BUN 18 19   CREATININE 0.88 1.03*   EGFR 73.5 60.8   GLUCOSE 92 97   CALCIUM 8.3* 8.9   TSH 1.280  --          Lab 06/07/22  0344 06/06/22 2045   TOTAL PROTEIN 5.9* 6.7   ALBUMIN 3.80 4.10   GLOBULIN 2.1 2.6   ALT (SGPT) 11 13   AST (SGOT) 15 17   BILIRUBIN 0.2 0.2   ALK PHOS 59 69         Lab 06/07/22  0344 06/06/22 2045   TROPONIN T <0.010 <0.010   PROTIME  --  9.9   INR  --  0.96         Lab 06/07/22 0344   CHOLESTEROL 157   LDL CHOL 92   HDL CHOL 37*   TRIGLYCERIDES 163*         Lab 06/06/22 2045   ABO TYPING O   RH TYPING Positive   ANTIBODY SCREEN Negative             Lab Results   Component Value Date    TSH 1.280 06/07/2022    LDL 92 06/07/2022       IMAGING STUDIES:  CT Angiogram Neck    Result Date: 6/6/2022   1. No large vessel occlusion or acute cerebral thrombus. 2. Variant anatomy as discussed above. 3. Carotid artery atherosclerotic vascular disease with no hemodynamically significant stenosis. 4. Additional vascular and nonvascular findings as noted above.  Electronically Signed By-Rei Smiley MD On:6/6/2022 9:38 PM This report was finalized on [id number] by  Rei Smiley MD.    MRI Brain Without Contrast    Result Date: 6/7/2022   1. Small areas of acute/subacute infarct in the right parietal  lobe. No hemorrhage. 2. Mild chronic small vessel ischemic change and mild generalized parenchymal volume loss. 3. Paranasal sinus mucosal disease with a fluid level in the left maxillary sinus suggesting acute sinusitis.  Electronically Signed By-Leroy Medina MD On:6/7/2022 8:45 AM This report was finalized on [id number] by  Leroy Medina MD.    XR Chest 1 View    Result Date: 6/6/2022  No active disease.  Electronically Signed By-Rei Smiley MD On:6/6/2022 9:28 PM This report was finalized on [id number] by  Rei Smiley MD.    CT Head Without Contrast Stroke Protocol    Result Date: 6/6/2022  IMPRESSION :  1. Volume loss secondary to cerebral atrophy. 2. Suggestion of chronic microvascular ischemia. 3. No acute intracranial findings. 4. Acute and chronic paranasal sinus disease.  Electronically Signed By-Rei Smiley MD On:6/6/2022 8:44 PM This report was finalized on [id number] by  Rei Smiley MD.    CT Angiogram Head w AI Analysis of LVO    Result Date: 6/6/2022   1. No large vessel occlusion or acute cerebral thrombus. 2. Variant anatomy as discussed above. 3. Carotid artery atherosclerotic vascular disease with no hemodynamically significant stenosis. 4. Additional vascular and nonvascular findings as noted above.  Electronically Signed By-Rei Smiley MD On:6/6/2022 9:38 PM This report was finalized on [id number] by  Rei Smiley MD.    CT CEREBRAL PERFUSION WITH & WITHOUT CONTRAST    Result Date: 6/6/2022  Normal study.  Electronically Signed By-Rei Smiley MD On:6/6/2022 9:08 PM This report was finalized on [id number] by  Rei Smiley MD.      I reviewed the patient's new clinical results.    ________________________________________________________     PROBLEM LIST:    Hyperlipidemia    Hypertension    Migraine headache    Obesity (BMI 30.0-34.9)    TIA (transient ischemic attack)    Hypokalemia    Sinusitis            ASSESSMENT/PLAN:  Is a very interesting 62-year-old female with right  cortical infarct    Her NIH is 0 and it was 0 when she presented to the ER, and thus she was not alteplase or intervention candidate as CTA was negative    This is a cortical infarct and she needs work-up.  My recommendation is cardiology consultation for MINOO and event monitor or loop recorder    She does not like aspirin and it causes symptoms so I agree with Plavix and Lipitor and modification of risk factors    If we find something on MINOO or event monitor or loop recorder then anticoagulation may be considered    Modification of stroke risk factors:   - Blood pressure should be less than 130/80 outpatient, HbA1c less than 6.5, LDL less than 70; b12>500 and smoking cessation if applicable. We would be grateful if the primary team / primary care physician would keep a close watch on the above targets.  - Stroke education  - Follow up with neurologist of choice      I discussed the patient's findings and my recommendations with patient    Yossi Vicente MD  06/07/22  10:39 EDT

## 2022-06-08 ENCOUNTER — READMISSION MANAGEMENT (OUTPATIENT)
Dept: CALL CENTER | Facility: HOSPITAL | Age: 62
End: 2022-06-08

## 2022-06-08 VITALS
RESPIRATION RATE: 16 BRPM | DIASTOLIC BLOOD PRESSURE: 57 MMHG | BODY MASS INDEX: 35.86 KG/M2 | OXYGEN SATURATION: 99 % | WEIGHT: 202.38 LBS | HEART RATE: 64 BPM | SYSTOLIC BLOOD PRESSURE: 120 MMHG | HEIGHT: 63 IN | TEMPERATURE: 97.9 F

## 2022-06-08 LAB
ANION GAP SERPL CALCULATED.3IONS-SCNC: 10 MMOL/L (ref 5–15)
BASOPHILS # BLD AUTO: 0 10*3/MM3 (ref 0–0.2)
BASOPHILS NFR BLD AUTO: 0.3 % (ref 0–1.5)
BH CV ECHO MEAS - ACS: 1.74 CM
BH CV ECHO MEAS - AO MAX PG: 7.9 MMHG
BH CV ECHO MEAS - AO MEAN PG: 3.3 MMHG
BH CV ECHO MEAS - AO ROOT DIAM: 2.8 CM
BH CV ECHO MEAS - AO V2 MAX: 140.3 CM/SEC
BH CV ECHO MEAS - AO V2 VTI: 29.8 CM
BH CV ECHO MEAS - AVA(I,D): 2.17 CM2
BH CV ECHO MEAS - EDV(CUBED): 95.2 ML
BH CV ECHO MEAS - EDV(MOD-SP4): 76.6 ML
BH CV ECHO MEAS - EF(MOD-BP): 58 %
BH CV ECHO MEAS - EF(MOD-SP4): 57.9 %
BH CV ECHO MEAS - ESV(CUBED): 23.9 ML
BH CV ECHO MEAS - ESV(MOD-SP4): 32.3 ML
BH CV ECHO MEAS - FS: 36.9 %
BH CV ECHO MEAS - IVS/LVPW: 1.15 CM
BH CV ECHO MEAS - IVSD: 1.02 CM
BH CV ECHO MEAS - LA A2CS (ATRIAL LENGTH): 4.7 CM
BH CV ECHO MEAS - LV DIASTOLIC VOL/BSA (35-75): 40.7 CM2
BH CV ECHO MEAS - LV MASS(C)D: 146.1 GRAMS
BH CV ECHO MEAS - LV MAX PG: 6.3 MMHG
BH CV ECHO MEAS - LV MEAN PG: 2.6 MMHG
BH CV ECHO MEAS - LV SYSTOLIC VOL/BSA (12-30): 17.1 CM2
BH CV ECHO MEAS - LV V1 MAX: 125.2 CM/SEC
BH CV ECHO MEAS - LV V1 VTI: 26.7 CM
BH CV ECHO MEAS - LVIDD: 4.6 CM
BH CV ECHO MEAS - LVIDS: 2.9 CM
BH CV ECHO MEAS - LVOT AREA: 2.41 CM2
BH CV ECHO MEAS - LVOT DIAM: 1.75 CM
BH CV ECHO MEAS - LVPWD: 0.88 CM
BH CV ECHO MEAS - MR MAX PG: 43.1 MMHG
BH CV ECHO MEAS - MR MAX VEL: 328.2 CM/SEC
BH CV ECHO MEAS - MV A MAX VEL: 42.6 CM/SEC
BH CV ECHO MEAS - MV DEC SLOPE: 351.3 CM/SEC2
BH CV ECHO MEAS - MV DEC TIME: 0.28 MSEC
BH CV ECHO MEAS - MV E MAX VEL: 98.2 CM/SEC
BH CV ECHO MEAS - MV E/A: 2.3
BH CV ECHO MEAS - MV MAX PG: 3.2 MMHG
BH CV ECHO MEAS - MV MEAN PG: 0.82 MMHG
BH CV ECHO MEAS - MV V2 VTI: 23 CM
BH CV ECHO MEAS - MVA(VTI): 2.8 CM2
BH CV ECHO MEAS - PA V2 MAX: 83.9 CM/SEC
BH CV ECHO MEAS - PULM A REVS DUR: 0.05 SEC
BH CV ECHO MEAS - PULM A REVS VEL: 18.3 CM/SEC
BH CV ECHO MEAS - PULM DIAS VEL: 65.8 CM/SEC
BH CV ECHO MEAS - PULM S/D: 1.25
BH CV ECHO MEAS - PULM SYS VEL: 82.4 CM/SEC
BH CV ECHO MEAS - RV MAX PG: 1.72 MMHG
BH CV ECHO MEAS - RV V1 MAX: 65.5 CM/SEC
BH CV ECHO MEAS - RV V1 VTI: 18.4 CM
BH CV ECHO MEAS - RVDD: 2.12 CM
BH CV ECHO MEAS - SI(MOD-SP4): 23.5 ML/M2
BH CV ECHO MEAS - SV(LVOT): 64.4 ML
BH CV ECHO MEAS - SV(MOD-SP4): 44.4 ML
BH CV ECHO MEAS - TR MAX PG: 20.5 MMHG
BH CV ECHO MEAS - TR MAX VEL: 219.6 CM/SEC
BUN SERPL-MCNC: 15 MG/DL (ref 8–23)
BUN/CREAT SERPL: 17.2 (ref 7–25)
CALCIUM SPEC-SCNC: 8.5 MG/DL (ref 8.6–10.5)
CHLORIDE SERPL-SCNC: 104 MMOL/L (ref 98–107)
CO2 SERPL-SCNC: 28 MMOL/L (ref 22–29)
CREAT SERPL-MCNC: 0.87 MG/DL (ref 0.57–1)
DEPRECATED RDW RBC AUTO: 43.3 FL (ref 37–54)
EGFRCR SERPLBLD CKD-EPI 2021: 74.5 ML/MIN/1.73
EOSINOPHIL # BLD AUTO: 0.3 10*3/MM3 (ref 0–0.4)
EOSINOPHIL NFR BLD AUTO: 4.7 % (ref 0.3–6.2)
ERYTHROCYTE [DISTWIDTH] IN BLOOD BY AUTOMATED COUNT: 13.6 % (ref 12.3–15.4)
GLUCOSE BLDC GLUCOMTR-MCNC: 89 MG/DL (ref 70–105)
GLUCOSE SERPL-MCNC: 97 MG/DL (ref 65–99)
HCT VFR BLD AUTO: 35.9 % (ref 34–46.6)
HGB BLD-MCNC: 12 G/DL (ref 12–15.9)
LYMPHOCYTES # BLD AUTO: 2.3 10*3/MM3 (ref 0.7–3.1)
LYMPHOCYTES NFR BLD AUTO: 36.4 % (ref 19.6–45.3)
MAXIMAL PREDICTED HEART RATE: 156 BPM
MCH RBC QN AUTO: 30.7 PG (ref 26.6–33)
MCHC RBC AUTO-ENTMCNC: 33.5 G/DL (ref 31.5–35.7)
MCV RBC AUTO: 91.5 FL (ref 79–97)
MONOCYTES # BLD AUTO: 0.5 10*3/MM3 (ref 0.1–0.9)
MONOCYTES NFR BLD AUTO: 7.2 % (ref 5–12)
NEUTROPHILS NFR BLD AUTO: 3.3 10*3/MM3 (ref 1.7–7)
NEUTROPHILS NFR BLD AUTO: 51.4 % (ref 42.7–76)
NRBC BLD AUTO-RTO: 0 /100 WBC (ref 0–0.2)
PLATELET # BLD AUTO: 273 10*3/MM3 (ref 140–450)
PMV BLD AUTO: 7.5 FL (ref 6–12)
POTASSIUM SERPL-SCNC: 4.1 MMOL/L (ref 3.5–5.2)
RBC # BLD AUTO: 3.92 10*6/MM3 (ref 3.77–5.28)
SODIUM SERPL-SCNC: 142 MMOL/L (ref 136–145)
STRESS TARGET HR: 133 BPM
WBC NRBC COR # BLD: 6.3 10*3/MM3 (ref 3.4–10.8)

## 2022-06-08 PROCEDURE — 99214 OFFICE O/P EST MOD 30 MIN: CPT | Performed by: INTERNAL MEDICINE

## 2022-06-08 PROCEDURE — 80048 BASIC METABOLIC PNL TOTAL CA: CPT | Performed by: INTERNAL MEDICINE

## 2022-06-08 PROCEDURE — G0378 HOSPITAL OBSERVATION PER HR: HCPCS

## 2022-06-08 PROCEDURE — 85025 COMPLETE CBC W/AUTO DIFF WBC: CPT | Performed by: INTERNAL MEDICINE

## 2022-06-08 PROCEDURE — 82962 GLUCOSE BLOOD TEST: CPT

## 2022-06-08 PROCEDURE — 99217 PR OBSERVATION CARE DISCHARGE MANAGEMENT: CPT | Performed by: INTERNAL MEDICINE

## 2022-06-08 RX ORDER — ATORVASTATIN CALCIUM 40 MG/1
40 TABLET, FILM COATED ORAL NIGHTLY
Qty: 30 TABLET | Refills: 0 | Status: CANCELLED | OUTPATIENT
Start: 2022-06-08 | End: 2022-07-08

## 2022-06-08 RX ORDER — ATORVASTATIN CALCIUM 40 MG/1
40 TABLET, FILM COATED ORAL NIGHTLY
Qty: 30 TABLET | Refills: 0 | Status: SHIPPED | OUTPATIENT
Start: 2022-06-08 | End: 2022-07-08 | Stop reason: SDUPTHER

## 2022-06-08 RX ORDER — VENLAFAXINE HYDROCHLORIDE 75 MG/1
225 CAPSULE, EXTENDED RELEASE ORAL DAILY
Qty: 90 CAPSULE | Refills: 0 | Status: SHIPPED | OUTPATIENT
Start: 2022-06-09 | End: 2022-07-09

## 2022-06-08 RX ORDER — ECHINACEA PURPUREA EXTRACT 125 MG
2 TABLET ORAL AS NEEDED
Qty: 1 EACH | Refills: 0 | Status: CANCELLED | OUTPATIENT
Start: 2022-06-08

## 2022-06-08 RX ADMIN — CETIRIZINE HYDROCHLORIDE 10 MG: 10 TABLET ORAL at 08:30

## 2022-06-08 RX ADMIN — METOPROLOL SUCCINATE 50 MG: 50 TABLET, EXTENDED RELEASE ORAL at 08:30

## 2022-06-08 RX ADMIN — VENLAFAXINE HYDROCHLORIDE 225 MG: 75 CAPSULE, EXTENDED RELEASE ORAL at 08:30

## 2022-06-08 RX ADMIN — Medication 3 ML: at 08:31

## 2022-06-08 RX ADMIN — BUPROPION HYDROCHLORIDE 300 MG: 150 TABLET, EXTENDED RELEASE ORAL at 08:30

## 2022-06-08 RX ADMIN — CLOBETASOL PROPIONATE: 0.5 CREAM TOPICAL at 08:31

## 2022-06-08 NOTE — PLAN OF CARE
Problem: Adjustment to Illness (Stroke, Ischemic/Transient Ischemic Attack)  Goal: Optimal Coping  Outcome: Ongoing, Progressing     Problem: Bowel Elimination Impaired (Stroke, Ischemic/Transient Ischemic Attack)  Goal: Effective Bowel Elimination  Outcome: Ongoing, Progressing     Problem: Cerebral Tissue Perfusion (Stroke, Ischemic/Transient Ischemic Attack)  Goal: Optimal Cerebral Tissue Perfusion  Outcome: Ongoing, Progressing     Problem: Cognitive Impairment (Stroke, Ischemic/Transient Ischemic Attack)  Goal: Optimal Cognitive Function  Outcome: Ongoing, Progressing     Problem: Communication Impairment (Stroke, Ischemic/Transient Ischemic Attack)  Goal: Improved Communication Skills  Outcome: Ongoing, Progressing     Problem: Functional Ability Impaired (Stroke, Ischemic/Transient Ischemic Attack)  Goal: Optimal Functional Ability  Outcome: Ongoing, Progressing  Intervention: Optimize Functional Ability  Recent Flowsheet Documentation  Taken 6/8/2022 1591 by Donna Morrison RN  Activity Management:   activity adjusted per tolerance   activity encouraged     Problem: Respiratory Compromise (Stroke, Ischemic/Transient Ischemic Attack)  Goal: Effective Oxygenation and Ventilation  Outcome: Ongoing, Progressing     Problem: Sensorimotor Impairment (Stroke, Ischemic/Transient Ischemic Attack)  Goal: Improved Sensorimotor Function  Outcome: Ongoing, Progressing     Problem: Swallowing Impairment (Stroke, Ischemic/Transient Ischemic Attack)  Goal: Optimal Eating and Swallowing without Aspiration  Outcome: Ongoing, Progressing     Problem: Urinary Elimination Impaired (Stroke, Ischemic/Transient Ischemic Attack)  Goal: Effective Urinary Elimination  Outcome: Ongoing, Progressing   Goal Outcome Evaluation:  Patient up in room. Denies any deficits at this time.

## 2022-06-08 NOTE — NURSING NOTE
Talked to Abby VICENTE about Event monitor system being down.  Patient is unable to receive event monitor today.  After talking with Dr. Garcia, she said it is fine for patient to be discharged today and come back for event monitor placement as an outpatient.

## 2022-06-08 NOTE — PLAN OF CARE
Goal Outcome Evaluation:  discharge instructions given.  Patient verbalized understanding.  Denies further questions or needs.

## 2022-06-08 NOTE — CONSULTS
Referring Provider: Hospitalist  Reason for Consultation: [facility]    Patient Care Team:  Patricia Steele MD as PCP - General    Chief complaint weakness and loss of speech    Subjective .     History of present illness:  Sarah Brannon is a 62 y.o. female with history of hypertension hyperlipidemia presented to the hospital with after she had an episode of weakness in her arms and loss of speech and disturbance in speech.  Patient did not have any headaches or visual disturbances.  No weakness of her legs.  No complains of any chest pain or shortness of breath.  No PND orthopnea.  She has been having some palpitation the last few days.  No syncope or swelling of the feet.  She has been taking her medicines regularly.  In the ER patient is noted to have sinus rhythm but had acute CVA and was admitted to the hospital and neurology has been consulted.    Review of Systems   Constitutional: Negative for fever and malaise/fatigue.   HENT: Negative for ear pain and nosebleeds.    Eyes: Negative for blurred vision and double vision.   Cardiovascular: Negative for chest pain, dyspnea on exertion and palpitations.   Respiratory: Negative for cough and shortness of breath.    Skin: Negative for rash.   Musculoskeletal: Negative for joint pain.   Gastrointestinal: Negative for abdominal pain, nausea and vomiting.   Neurological: Positive for brief paralysis. Negative for focal weakness and headaches.   Psychiatric/Behavioral: Negative for depression. The patient is not nervous/anxious.    All other systems reviewed and are negative.      History  Past Medical History:   Diagnosis Date   • Arthritis    • Baker's cyst of knee     RIGHT   • Depression    • Hiatal hernia    • History of breast augmentation 04/07/2020   • History of palpitations    • Hyperlipemia    • Hypertension    • Migraine    • PONV (postoperative nausea and vomiting)     SLOW TO WAKE   • Seasonal allergies        Past Surgical History:    Procedure Laterality Date   • BREAST AUGMENTATION Bilateral 06/2020   • ENDOMETRIAL ABLATION      menustrual   • FOOT SURGERY      rt and lt foot   • LEEP     • MOLE REMOVAL     • NASAL SEPTUM SURGERY     • TOTAL KNEE ARTHROPLASTY Right 8/23/2016    Procedure: RT TOTAL KNEE ARTHROPLASTY WITH ROBERTO NAVIGATION, depuy;  Surgeon: Hakan Mccann MD;  Location: [facility];  Service:        Family History   Problem Relation Age of Onset   • Heart disease Mother    • Heart disease Father        Social History     Tobacco Use   • Smoking status: Never Smoker   • Smokeless tobacco: Never Used   Vaping Use   • Vaping Use: Never used   Substance Use Topics   • Alcohol use: No   • Drug use: No        Medications Prior to Admission   Medication Sig Dispense Refill Last Dose   • buPROPion XL (WELLBUTRIN XL) 300 MG 24 hr tablet Take 300 mg by mouth every morning.   Past Week at Unknown time   • cetirizine (zyrTEC) 10 MG tablet TAKE 1 TABLET BY MOUTH EVERY DAY  90 tablet 3 Past Week at Unknown time   • clobetasol (TEMOVATE) 0.05 % cream Apply  topically to the appropriate area as directed 2 (Two) Times a Day. 30 g 1 Past Week at Unknown time   • metoprolol succinate XL (TOPROL-XL) 50 MG 24 hr tablet TAKE 1 TABLET BY MOUTH EVERY DAY 90 tablet 0 Past Week at Unknown time   • pravastatin (PRAVACHOL) 80 MG tablet TAKE 1 TABLET BY MOUTH AT BEDTIME 90 tablet 0 Past Week at Unknown time   • tiZANidine (ZANAFLEX) 4 MG tablet Take 1 tablet by mouth At Night As Needed for Muscle Spasms. 15 tablet 0 Past Month at Unknown time   • triamterene-hydrochlorothiazide (MAXZIDE) 75-50 MG per tablet TAKE 1 TABLET BY MOUTH EVERY DAY 90 tablet 0 Past Week at Unknown time   • venlafaxine XR (EFFEXOR-XR) 75 MG 24 hr capsule Take 225 mg by mouth Daily.   Past Week at Unknown time         Bee venom and Shellfish-derived products    Scheduled Meds:atorvastatin, 40 mg, Oral, Nightly  buPROPion XL, 300 mg, Oral, Daily  cetirizine, 10 mg, Oral,  "Daily  clobetasol, , Topical, BID  metoprolol succinate XL, 50 mg, Oral, Daily  sodium chloride, 3 mL, Intravenous, Q12H  venlafaxine XR, 225 mg, Oral, Daily      Continuous Infusions:sodium chloride, 100 mL/hr, Last Rate: Stopped (06/07/22 1221)      PRN Meds:.•  acetaminophen **OR** acetaminophen  •  bisacodyl  •  potassium chloride **OR** potassium chloride **OR** potassium chloride  •  sodium chloride  •  sodium chloride  •  sodium chloride  •  tiZANidine    Objective     VITAL SIGNS  Vitals:    06/07/22 0633 06/07/22 0702 06/07/22 1423 06/07/22 1822   BP: 140/65 140/65 131/78 110/42   BP Location: Left arm  Left arm Right arm   Patient Position: Sitting   Lying   Pulse: 73   67   Resp: 11  17 20   Temp: 98.6 °F (37 °C)  97.5 °F (36.4 °C) 98.3 °F (36.8 °C)   TempSrc: Oral  Oral Oral   SpO2: 97%  98% 96%   Weight: 89.4 kg (197 lb 1.5 oz) 89.4 kg (197 lb)     Height:  160 cm (63\")         Flowsheet Rows    Flowsheet Row First Filed Value   Admission Height 160 cm (63\") Documented at 06/06/2022 2021   Admission Weight 86.7 kg (191 lb 2.2 oz) Documented at 06/06/2022 2021           TELEMETRY: Sinus rhythm    Physical Exam:  Constitutional:       Appearance: Well-developed.   Eyes:      General: No scleral icterus.     Conjunctiva/sclera: Conjunctivae normal.      Pupils: Pupils are equal, round, and reactive to light.   HENT:      Head: Normocephalic and atraumatic.   Neck:      Vascular: No carotid bruit or JVD.   Pulmonary:      Effort: Pulmonary effort is normal.      Breath sounds: Normal breath sounds. No wheezing. No rales.   Cardiovascular:      Normal rate. Regular rhythm.   Pulses:     Intact distal pulses.   Abdominal:      General: Bowel sounds are normal.      Palpations: Abdomen is soft.   Musculoskeletal: Normal range of motion.      Cervical back: Normal range of motion and neck supple. Skin:     General: Skin is warm and dry.      Findings: No rash.   Neurological:      Mental Status: Alert.      " "Comments: No focal deficits          Results Review:   I reviewed the patient's new clinical results.  Lab Results (last 24 hours)     Procedure Component Value Units Date/Time    POC Glucose Once [[id number]]  (Abnormal) Collected: 06/07/22 1500    Specimen: Blood Updated: 06/07/22 1501     Glucose 157 mg/dL      Comment: Serial Number: [id number]:  [id number]       Hemoglobin A1c [[id number]]  (Abnormal) Collected: 06/07/22 0344    Specimen: Blood Updated: 06/07/22 1103     Hemoglobin A1C 5.7 %     Narrative:      Hemoglobin A1C Reference Range:    <5.7 %        Normal  5.7-6.4 %     Increased risk for diabetes  > 6.4 %        Diabetes       These guidelines have been recommended by the American Diabetic Association for Hgb A1c.      The following 2010 guidelines have been recommended by the American Diabetes Association for Hemoglobin A1c.    HBA1c 5.7-6.4% Increased risk for future diabetes (pre-diabetes)  HBA1c     >6.4% Diabetes      Procalcitonin [[id number]]  (Normal) Collected: 06/07/22 0344    Specimen: Blood Updated: 06/07/22 0456     Procalcitonin 0.06 ng/mL     Narrative:      As a Marker for Sepsis (Non-Neonates):    1. <0.5 ng/mL represents a low risk of severe sepsis and/or septic shock.  2. >2 ng/mL represents a high risk of severe sepsis and/or septic shock.    As a Marker for Lower Respiratory Tract Infections that require antibiotic therapy:    PCT on Admission    Antibiotic Therapy       6-12 Hrs later    >0.5                Strongly Recommended  >0.25 - <0.5        Recommended   0.1 - 0.25          Discouraged              Remeasure/reassess PCT  <0.1                Strongly Discouraged     Remeasure/reassess PCT    As 28 day mortality risk marker: \"Change in Procalcitonin Result\" (>80% or <=80%) if Day 0 (or Day 1) and Day 4 values are available. Refer to http://www.[facility]-pct-calculator.com    Change in PCT <=80%  A decrease of PCT levels below or equal to 80% defines a positive change in " PCT test result representing a higher risk for 28-day all-cause mortality of patients diagnosed with severe sepsis for septic shock.    Change in PCT >80%  A decrease of PCT levels of more than 80% defines a negative change in PCT result representing a lower risk for 28-day all-cause mortality of patients diagnosed with severe sepsis or septic shock.       TSH [[id number]]  (Normal) Collected: 06/07/22 0344    Specimen: Blood Updated: 06/07/22 0456     TSH 1.280 uIU/mL     Troponin [[id number]]  (Normal) Collected: 06/07/22 0344    Specimen: Blood Updated: 06/07/22 0456     Troponin T <0.010 ng/mL     Narrative:      Troponin T Reference Range:  <= 0.03 ng/mL-   Negative for AMI  >0.03 ng/mL-     Abnormal for myocardial necrosis.  Clinicians would have to utilize clinical acumen, EKG, Troponin and serial changes to determine if it is an Acute Myocardial Infarction or myocardial injury due to an underlying chronic condition.       Results may be falsely decreased if patient taking Biotin.      Comprehensive Metabolic Panel [[id number]]  (Abnormal) Collected: 06/07/22 0344    Specimen: Blood Updated: 06/07/22 0455     Glucose 92 mg/dL      BUN 18 mg/dL      Creatinine 0.88 mg/dL      Sodium 139 mmol/L      Potassium 3.6 mmol/L      Comment: Slight hemolysis detected by analyzer. Results may be affected.        Chloride 102 mmol/L      CO2 28.0 mmol/L      Calcium 8.3 mg/dL      Total Protein 5.9 g/dL      Albumin 3.80 g/dL      ALT (SGPT) 11 U/L      AST (SGOT) 15 U/L      Alkaline Phosphatase 59 U/L      Total Bilirubin 0.2 mg/dL      Globulin 2.1 gm/dL      A/G Ratio 1.8 g/dL      BUN/Creatinine Ratio 20.5     Anion Gap 9.0 mmol/L      eGFR 73.5 mL/min/1.73      Comment: National Kidney Foundation and American Society of Nephrology (ASN) Task Force recommended calculation based on the Chronic Kidney Disease Epidemiology Collaboration (CKD-EPI) equation refit without adjustment for race.       Narrative:      GFR Normal  >60  Chronic Kidney Disease <60  Kidney Failure <15      Lipid Panel [[id number]]  (Abnormal) Collected: 06/07/22 0344    Specimen: Blood Updated: 06/07/22 0455     Total Cholesterol 157 mg/dL      Triglycerides 163 mg/dL      HDL Cholesterol 37 mg/dL      LDL Cholesterol  92 mg/dL      VLDL Cholesterol 28 mg/dL      LDL/HDL Ratio 2.36    Narrative:      Cholesterol Reference Ranges  (U.S. Department of Health and Human Services ATP III Classifications)    Desirable          <200 mg/dL  Borderline High    200-239 mg/dL  High Risk          >240 mg/dL      Triglyceride Reference Ranges  (U.S. Department of Health and Human Services ATP III Classifications)    Normal           <150 mg/dL  Borderline High  150-199 mg/dL  High             200-499 mg/dL  Very High        >500 mg/dL    HDL Reference Ranges  (U.S. Department of Health and Human Services ATP III Classifications)    Low     <40 mg/dl (major risk factor for CHD)  High    >60 mg/dl ('negative' risk factor for CHD)        LDL Reference Ranges  (U.S. Department of Health and Human Services ATP III Classifications)    Optimal          <100 mg/dL  Near Optimal     100-129 mg/dL  Borderline High  130-159 mg/dL  High             160-189 mg/dL  Very High        >189 mg/dL    CBC (No Diff) [[id number]]  (Normal) Collected: 06/07/22 0344    Specimen: Blood Updated: 06/07/22 0435     WBC 7.20 10*3/mm3      RBC 3.88 10*6/mm3      Hemoglobin 12.0 g/dL      Hematocrit 35.8 %      MCV 92.3 fL      MCH 30.9 pg      MCHC 33.5 g/dL      RDW 13.9 %      RDW-SD 44.6 fl      MPV 7.9 fL      Platelets 280 10*3/mm3     COVID PRE-OP / PRE-PROCEDURE SCREENING ORDER (NO ISOLATION) - Swab, Nasopharynx [[id number]]  (Normal) Collected: 06/06/22 2157    Specimen: Swab from Nasopharynx Updated: 06/06/22 2238    Narrative:      The following orders were created for panel order COVID PRE-OP / PRE-PROCEDURE SCREENING ORDER (NO ISOLATION) - Swab, Nasopharynx.  Procedure                                Abnormality         Status                     ---------                               -----------         ------                     COVID-19,CEPHEID/VIVIAN,CO...[[id number]]  Normal              Final result                 Please view results for these tests on the individual orders.    COVID-19,CEPHEID/VIVIAN,COR/KYLIE/PAD/ROMI IN-HOUSE(OR EMERGENT/ADD-ON),NP SWAB IN TRANSPORT MEDIA 3-4 HR TAT, RT-PCR - Swab, Nasopharynx [[id number]]  (Normal) Collected: 06/06/22 2157    Specimen: Swab from Nasopharynx Updated: 06/06/22 2238     COVID19 Not Detected    Narrative:      Fact sheet for providers: https://www.fda.gov/media/351235/download     Fact sheet for patients: https://www.fda.gov/media/415599/download  Fact sheet for providers: https://www.fda.gov/media/686822/download     Fact sheet for patients: https://www.fda.gov/media/488668/download    [place] Draw [[id number]] Collected: 06/06/22 2045    Specimen: Blood Updated: 06/06/22 2147    Narrative:      The following orders were created for panel order [place] Draw.  Procedure                               Abnormality         Status                     ---------                               -----------         ------                     Green Top (Gel)[[id number]]                                  Final result               Lavender Top[[id number]]                                     Final result               Gold Top - SST[[id number]]                                   Final result               Light Blue Top[[id number]]                                   Final result                 Please view results for these tests on the individual orders.    Light Blue Top [[id number]] Collected: 06/06/22 2045    Specimen: Blood Updated: 06/06/22 2147     Extra Tube Hold for add-ons.     Comment: Auto resulted       Lavender Top [[id number]] Collected: 06/06/22 2045    Specimen: Blood Updated: 06/06/22 2147     Extra Tube hold for add-on     Comment: Auto resulted       Green Top  (Gel) [[id number]] Collected: 06/06/22 2045    Specimen: Blood Updated: 06/06/22 2123     Extra Tube hold    Troponin [[id number]]  (Normal) Collected: 06/06/22 2045    Specimen: Blood Updated: 06/06/22 2118     Troponin T <0.010 ng/mL     Narrative:      Troponin T Reference Range:  <= 0.03 ng/mL-   Negative for AMI  >0.03 ng/mL-     Abnormal for myocardial necrosis.  Clinicians would have to utilize clinical acumen, EKG, Troponin and serial changes to determine if it is an Acute Myocardial Infarction or myocardial injury due to an underlying chronic condition.       Results may be falsely decreased if patient taking Biotin.      Comprehensive Metabolic Panel [[id number]]  (Abnormal) Collected: 06/06/22 2045    Specimen: Blood Updated: 06/06/22 2115     Glucose 97 mg/dL      BUN 19 mg/dL      Creatinine 1.03 mg/dL      Sodium 137 mmol/L      Potassium 3.2 mmol/L      Comment: Slight hemolysis detected by analyzer. Results may be affected.        Chloride 98 mmol/L      CO2 26.0 mmol/L      Calcium 8.9 mg/dL      Total Protein 6.7 g/dL      Albumin 4.10 g/dL      ALT (SGPT) 13 U/L      AST (SGOT) 17 U/L      Alkaline Phosphatase 69 U/L      Total Bilirubin 0.2 mg/dL      Globulin 2.6 gm/dL      A/G Ratio 1.6 g/dL      BUN/Creatinine Ratio 18.4     Anion Gap 13.0 mmol/L      eGFR 60.8 mL/min/1.73      Comment: National Kidney Foundation and American Society of Nephrology (ASN) Task Force recommended calculation based on the Chronic Kidney Disease Epidemiology Collaboration (CKD-EPI) equation refit without adjustment for race.       Narrative:      GFR Normal >60  Chronic Kidney Disease <60  Kidney Failure <15      Gold Top - SST [[id number]] Collected: 06/06/22 2045    Specimen: Blood Updated: 06/06/22 2101     Extra Tube hold    Protime-INR [[id number]]  (Normal) Collected: 06/06/22 2045    Specimen: Blood Updated: 06/06/22 2100     Protime 9.9 Seconds      INR 0.96    aPTT [[id number]]  (Normal) Collected: 06/06/22 2045     Specimen: Blood Updated: 06/06/22 2100     PTT 28.8 seconds     CBC & Differential [[id number]]  (Normal) Collected: 06/06/22 2045    Specimen: Blood Updated: 06/06/22 2049    Narrative:      The following orders were created for panel order CBC & Differential.  Procedure                               Abnormality         Status                     ---------                               -----------         ------                     CBC Auto Differential[[id number]]        Normal              Final result                 Please view results for these tests on the individual orders.    CBC Auto Differential [[id number]]  (Normal) Collected: 06/06/22 2045    Specimen: Blood Updated: 06/06/22 2049     WBC 7.20 10*3/mm3      RBC 4.52 10*6/mm3      Hemoglobin 13.7 g/dL      Hematocrit 41.5 %      MCV 91.8 fL      MCH 30.4 pg      MCHC 33.1 g/dL      RDW 13.8 %      RDW-SD 44.2 fl      MPV 7.6 fL      Platelets 299 10*3/mm3      Neutrophil % 52.3 %      Lymphocyte % 35.6 %      Monocyte % 6.4 %      Eosinophil % 5.2 %      Basophil % 0.5 %      Neutrophils, Absolute 3.80 10*3/mm3      Lymphocytes, Absolute 2.60 10*3/mm3      Monocytes, Absolute 0.50 10*3/mm3      Eosinophils, Absolute 0.40 10*3/mm3      Basophils, Absolute 0.00 10*3/mm3      nRBC 0.0 /100 WBC     POC Glucose Once [[id number]]  (Normal) Collected: 06/06/22 2028    Specimen: Blood Updated: 06/06/22 2029     Glucose 102 mg/dL      Comment: Serial Number: [id number]:  [id number]             Imaging Results (Last 24 Hours)     Procedure Component Value Units Date/Time    MRI Brain Without Contrast [[id number]] Collected: 06/07/22 0840     Updated: 06/07/22 0847    Narrative:      Examination: MRI BRAIN WO CONTRAST-     Date of Exam: 6/7/2022 7:43 AM     Indication: Stroke, follow up; G45.9-Transient cerebral ischemic attack,  unspecified.     Comparison: CT head and CT head angiogram and perfusion 6/6/2022.     Technique: Standard non-contrast MR pulse  sequences of the brain were  obtained.     Findings:  There are small areas of cortical and subcortical diffusion restriction  in the lateral posterior right parietal lobe compatible with  acute/subacute infarct. There is no evidence of acute or chronic  intracranial hemorrhage.  There is mild generalized parenchymal volume  loss. There are patchy areas of FLAIR hyperintense signal within the  cerebral white matter. No mass effect or midline shift. No abnormal  extra-axial collections.  The major vascular flow voids appear intact.  The basal ganglia, brainstem and cerebellum appear within normal limits.   Calvarial and superficial soft tissue signal is within normal limits.   Orbits appear unremarkable.  There is mucosal thickening and a fluid  level in the left maxillary sinus. There is mild frontal, ethmoid and  right maxillary sinus mucosal thickening. The mastoid air cells appear  well aerated. Midline structures are intact.        Impression:         1. Small areas of acute/subacute infarct in the right parietal lobe. No  hemorrhage.  2. Mild chronic small vessel ischemic change and mild generalized  parenchymal volume loss.  3. Paranasal sinus mucosal disease with a fluid level in the left  maxillary sinus suggesting acute sinusitis.     Electronically Signed By-Leryo Medina MD On:6/7/2022 8:45 AM  This report was finalized on [id number] by  Leroy Medina MD.    CT Angiogram Head w AI Analysis of LVO [[id number]] Collected: 06/06/22 2130     Updated: 06/06/22 2140    Narrative:         DATE OF EXAM:  6/6/2022 8:58 PM     PROCEDURE:  CT ANGIOGRAM HEAD W AI ANALYSIS OF LVO-, CT ANGIOGRAM NECK-     INDICATIONS:   Acute cerebral vascular accident, slurred speech, loss of sensation in  the left upper extremity, hypertension.     COMPARISON:   CT head and cerebral perfusion study also performed on 06/06/2022.     TECHNIQUE:  CTA of the head and CTA of the neck was performed after the  intravenous  administration of Isovue 370. Reconstructed coronal and sagittal images  were also obtained. In addition, a 3 D volume rendered image was  obtained after post processing. Automated exposure control and iterative  reconstruction methods were used. AI analysis of LVO was utilized for  the CTA Head imaging portion of the study.      FINDINGS:  VASCULAR FINDINGS: There is no large vessel occlusion or filling defect  to indicate cerebral thrombus. There is fetal origin of both posterior  cerebral arteries which is an anatomic variant. The anterior, middle,  and posterior cerebral arteries are patent. The basilar artery is  patent. There is no saccular aneurysm or arteriovenous malformation.  There are atherosclerotic vascular calcifications at both carotid  bifurcations with no hemodynamically significant stenosis. The right  vertebral artery is widely patent and is the dominant blood supply to  the basilar artery. The left vertebral artery is also patent. It is  smaller in size and arises directly from the thoracic aortic arch. There  are atherosclerotic vascular calcifications within the thoracic aorta.  The innominate, right subclavian, and left subclavian arteries are  patent.     NONVASCULAR FINDINGS: There are no enhancing intracranial lesions. There  is acute and chronic paranasal sinus disease. The parotid and  submandibular glands are normal. The parapharyngeal fat is preserved.  The nasopharynx, palatine tonsils, vallecula, epiglottis, piriform  sinuses, and larynx are normal. The thyroid gland is not enlarged. The  pulmonary apices are clear. There are degenerative changes within the  cervical spine.          Impression:         1. No large vessel occlusion or acute cerebral thrombus.  2. Variant anatomy as discussed above.  3. Carotid artery atherosclerotic vascular disease with no  hemodynamically significant stenosis.  4. Additional vascular and nonvascular findings as noted above.      Electronically Signed By-Rei Smiley MD On:6/6/2022 9:38 PM  This report was finalized on [id number] by  Rei Smiley MD.    CT Angiogram Neck [[id number]] Collected: 06/06/22 2130     Updated: 06/06/22 2140    Narrative:         DATE OF EXAM:  6/6/2022 8:58 PM     PROCEDURE:  CT ANGIOGRAM HEAD W AI ANALYSIS OF LVO-, CT ANGIOGRAM NECK-     INDICATIONS:   Acute cerebral vascular accident, slurred speech, loss of sensation in  the left upper extremity, hypertension.     COMPARISON:   CT head and cerebral perfusion study also performed on 06/06/2022.     TECHNIQUE:  CTA of the head and CTA of the neck was performed after the intravenous  administration of Isovue 370. Reconstructed coronal and sagittal images  were also obtained. In addition, a 3 D volume rendered image was  obtained after post processing. Automated exposure control and iterative  reconstruction methods were used. AI analysis of LVO was utilized for  the CTA Head imaging portion of the study.      FINDINGS:  VASCULAR FINDINGS: There is no large vessel occlusion or filling defect  to indicate cerebral thrombus. There is fetal origin of both posterior  cerebral arteries which is an anatomic variant. The anterior, middle,  and posterior cerebral arteries are patent. The basilar artery is  patent. There is no saccular aneurysm or arteriovenous malformation.  There are atherosclerotic vascular calcifications at both carotid  bifurcations with no hemodynamically significant stenosis. The right  vertebral artery is widely patent and is the dominant blood supply to  the basilar artery. The left vertebral artery is also patent. It is  smaller in size and arises directly from the thoracic aortic arch. There  are atherosclerotic vascular calcifications within the thoracic aorta.  The innominate, right subclavian, and left subclavian arteries are  patent.     NONVASCULAR FINDINGS: There are no enhancing intracranial lesions. There  is acute and chronic  paranasal sinus disease. The parotid and  submandibular glands are normal. The parapharyngeal fat is preserved.  The nasopharynx, palatine tonsils, vallecula, epiglottis, piriform  sinuses, and larynx are normal. The thyroid gland is not enlarged. The  pulmonary apices are clear. There are degenerative changes within the  cervical spine.          Impression:         1. No large vessel occlusion or acute cerebral thrombus.  2. Variant anatomy as discussed above.  3. Carotid artery atherosclerotic vascular disease with no  hemodynamically significant stenosis.  4. Additional vascular and nonvascular findings as noted above.     Electronically Signed By-Rei Smiley MD On:6/6/2022 9:38 PM  This report was finalized on [id number] by  Rei Smiley MD.    XR Chest 1 View [[id number]] Collected: 06/06/22 2127     Updated: 06/06/22 2130    Narrative:      DATE OF EXAM:  6/6/2022 9:20 PM     PROCEDURE:  XR CHEST 1 VW-     INDICATIONS:  Acute cerebral vascular accident, confusion, blurred vision, slurred  speech.      COMPARISON:  11/13/2012.     TECHNIQUE:   Single radiographic view of the chest was obtained.     FINDINGS:  The heart size is normal. The pulmonary vascular markings are normal.  The lungs and pleural spaces are clear of active disease.  There are  atherosclerotic vascular calcifications in the thoracic aorta. There is  spondylosis of the thoracic spine.       Impression:      No active disease.     Electronically Signed By-Rei Smiley MD On:6/6/2022 9:28 PM  This report was finalized on [id number] by  Rei Smiley MD.    CT CEREBRAL PERFUSION WITH & WITHOUT CONTRAST [[id number]] Collected: 06/06/22 2106     Updated: 06/06/22 2110    Narrative:         DATE OF EXAM:  6/6/2022 8:58 PM     PROCEDURE:   CT CEREBRAL PERFUSION W WO CONTRAST-     INDICATIONS:   Left-sided weakness, slurred speech, loss of sensation in the left upper  extremity.     COMPARISON:  No Comparisons Available     TECHNIQUE:   Routine  transaxial cuts were obtained through the head without  administration of  contrast. Routine transaxial cuts were then obtained  through the head following the administration of Isovue 370  intravenously. Core blood volume, core blood flow, mean transit time,  and Tmax images were obtained utilizing the Rapid software protocol. A  limited CT angiogram of the head was also performed to measure the blood  vessel density.      FINDINGS:  The ischemic core volume measures 0 cc. The ischemic penumbra measures 0  cc. There is no mismatch volume or ratio.        Impression:      Normal study.     Electronically Signed By-Rei Smiley MD On:6/6/2022 9:08 PM  This report was finalized on [id number] by  Rei Smiley MD.    CT Head Without Contrast Stroke Protocol [[id number]] Collected: 06/06/22 2042     Updated: 06/06/22 2046    Narrative:         DATE OF EXAM:  6/6/2022 8:39 PM     PROCEDURE:   CT HEAD WO CONTRAST STROKE PROTOCOL-     INDICATIONS:  Slurred speech, left upper extremity sensory loss.     COMPARISON:   No Comparisons Available     TECHNIQUE:  Routine transaxial cuts were obtained through the head without the  administration of contrast. Automated exposure control and iterative  reconstruction methods were used.     FINDINGS:  There is enlargement the sulci, fissures, and ventricles indicating  volume loss secondary to cerebral atrophy. The basal cisterns are  well-maintained. There are some subtle areas of decreased density  suggested in the white matter tracts felt to represent chronic  microvascular ischemia. The gray-white matter differentiation is  preserved. There is no acute hemorrhage, midline shift, or suspicious  extra-axial fluid collections. The orbital contents are normal. There is  an air-fluid level in the left maxillary sinus consistent with acute  sinusitis. There is mucosal thickening in the ethmoid sinuses indicating  chronic sinus disease. The mastoid sinuses are clear. The calvarium  is  unremarkable.        Impression:      IMPRESSION :     1. Volume loss secondary to cerebral atrophy.  2. Suggestion of chronic microvascular ischemia.  3. No acute intracranial findings.  4. Acute and chronic paranasal sinus disease.     Electronically Signed By-Rei Smiley MD On:6/6/2022 8:44 PM  This report was finalized on [id number] by  Rei Smiley MD.          EKG      I personally viewed and interpreted the patient's EKG/Telemetry data:    ECHOCARDIOGRAM:      STRESS MYOVIEW:    CARDIAC CATHETERIZATION:    OTHER:         Assessment & Plan     Active Problems:    Hyperlipidemia    Hypertension    Migraine headache    Obesity (BMI 30.0-34.9)    TIA (transient ischemic attack)    Hypokalemia    Sinusitis      Patient presented with TIA and has history of hypertension hyperlipidemia  Patient is currently in sinus rhythm and does not have any arrhythmias  Patient will not need a MINOO but will need a long-term monitor to rule out any arrhythmias  Patient will need to be on anticoagulation with aspirin probably Plavix but not on any Eliquis or warfarin and to there is any arrhythmia  Will monitor heart rhythm  Patient is having an echocardiogram performed for LV function valvular abnormalities  dIscussed the patients findings and my recommendations with patient and nurse    Js Garcia MD  06/07/22  20:23 EDT

## 2022-06-08 NOTE — OUTREACH NOTE
Prep Survey    Flowsheet Row Responses   [facility] patient discharged from? [facility]   Is LACE score < 7 ? Yes   Emergency Room discharge w/ pulse ox? No   Eligibility [facility]   Date of Admission 06/06/22   Date of Discharge 06/08/22   Discharge Disposition Home or Self Care   Discharge diagnosis TIA (transient ischemic attack)   Does the patient have one of the following disease processes/diagnoses(primary or secondary)? Stroke (TIA)   Does the patient have Home health ordered? No   Is there a DME ordered? No   Prep survey completed? Yes          JESSICA JOHNS - Registered Nurse

## 2022-06-08 NOTE — PLAN OF CARE
Problem: Adult Inpatient Plan of Care  Goal: Plan of Care Review  Outcome: Ongoing, Progressing  Goal: Patient-Specific Goal (Individualized)  Outcome: Ongoing, Progressing  Goal: Absence of Hospital-Acquired Illness or Injury  Outcome: Ongoing, Progressing  Intervention: Identify and Manage Fall Risk  Recent Flowsheet Documentation  Taken 6/8/2022 0400 by Ly Gonzalez RN  Safety Promotion/Fall Prevention:   safety round/check completed   assistive device/personal items within reach   clutter free environment maintained   nonskid shoes/slippers when out of bed   room organization consistent  Taken 6/8/2022 0200 by Ly Gonzalez RN  Safety Promotion/Fall Prevention:   safety round/check completed   assistive device/personal items within reach   clutter free environment maintained   nonskid shoes/slippers when out of bed   room organization consistent  Taken 6/8/2022 0000 by Ly Gonzalez RN  Safety Promotion/Fall Prevention:   safety round/check completed   clutter free environment maintained   assistive device/personal items within reach   nonskid shoes/slippers when out of bed   room organization consistent  Taken 6/7/2022 2200 by Ly Gonzalez RN  Safety Promotion/Fall Prevention:   safety round/check completed   assistive device/personal items within reach   clutter free environment maintained   activity supervised   fall prevention program maintained   nonskid shoes/slippers when out of bed   room organization consistent  Taken 6/7/2022 2000 by Ly Gonzalez RN  Safety Promotion/Fall Prevention:   safety round/check completed   clutter free environment maintained   assistive device/personal items within reach   nonskid shoes/slippers when out of bed   room organization consistent  Intervention: Prevent and Manage VTE (Venous Thromboembolism) Risk  Recent Flowsheet Documentation  Taken 6/8/2022 0400 by Ly Gonzalez RN  VTE Prevention/Management:   sequential compression devices off    patient refused intervention  Taken 6/7/2022 2000 by Ly Gonzalez RN  VTE Prevention/Management: sequential compression devices off  Intervention: Prevent Infection  Recent Flowsheet Documentation  Taken 6/8/2022 0400 by Ly Gonzalze RN  Infection Prevention: environmental surveillance performed  Taken 6/8/2022 0200 by Ly Gonzalez RN  Infection Prevention:   environmental surveillance performed   hand hygiene promoted  Taken 6/8/2022 0000 by Ly Gonzalez RN  Infection Prevention: environmental surveillance performed  Taken 6/7/2022 2200 by Ly Gonzalez RN  Infection Prevention: environmental surveillance performed  Taken 6/7/2022 2000 by Ly Gonzalez RN  Infection Prevention: rest/sleep promoted  Goal: Optimal Comfort and Wellbeing  Outcome: Ongoing, Progressing  Intervention: Provide Person-Centered Care  Recent Flowsheet Documentation  Taken 6/8/2022 0400 by Ly Gonzalez RN  Trust Relationship/Rapport:   empathic listening provided   care explained   choices provided  Taken 6/8/2022 0000 by Ly Gonzalez RN  Trust Relationship/Rapport: care explained  Taken 6/7/2022 2000 by Ly Gonzalez RN  Trust Relationship/Rapport:   care explained   choices provided  Goal: Readiness for Transition of Care  Outcome: Ongoing, Progressing     Problem: Hypertension Comorbidity  Goal: Blood Pressure in Desired Range  Outcome: Ongoing, Progressing  Intervention: Maintain Blood Pressure Management  Recent Flowsheet Documentation  Taken 6/7/2022 2000 by Ly Gonzalez RN  Medication Review/Management: medications reviewed     Problem: Adjustment to Illness (Stroke, Ischemic/Transient Ischemic Attack)  Goal: Optimal Coping  Outcome: Ongoing, Progressing  Intervention: Support Psychosocial Response to Stroke  Recent Flowsheet Documentation  Taken 6/8/2022 0400 by Ly Gonzalez RN  Family/Support System Care:   self-care encouraged   support provided  Taken 6/8/2022 0000 by Carlos  Ly, RN  Family/Support System Care: support provided  Taken 6/7/2022 2000 by Ly Gonzalez RN  Family/Support System Care: caregiver stress acknowledged     Problem: Bowel Elimination Impaired (Stroke, Ischemic/Transient Ischemic Attack)  Goal: Effective Bowel Elimination  Outcome: Ongoing, Progressing     Problem: Cerebral Tissue Perfusion (Stroke, Ischemic/Transient Ischemic Attack)  Goal: Optimal Cerebral Tissue Perfusion  Outcome: Ongoing, Progressing     Problem: Cognitive Impairment (Stroke, Ischemic/Transient Ischemic Attack)  Goal: Optimal Cognitive Function  Outcome: Ongoing, Progressing     Problem: Communication Impairment (Stroke, Ischemic/Transient Ischemic Attack)  Goal: Improved Communication Skills  Outcome: Ongoing, Progressing     Problem: Functional Ability Impaired (Stroke, Ischemic/Transient Ischemic Attack)  Goal: Optimal Functional Ability  Outcome: Ongoing, Progressing     Problem: Respiratory Compromise (Stroke, Ischemic/Transient Ischemic Attack)  Goal: Effective Oxygenation and Ventilation  Outcome: Ongoing, Progressing     Problem: Sensorimotor Impairment (Stroke, Ischemic/Transient Ischemic Attack)  Goal: Improved Sensorimotor Function  Outcome: Ongoing, Progressing     Problem: Swallowing Impairment (Stroke, Ischemic/Transient Ischemic Attack)  Goal: Optimal Eating and Swallowing without Aspiration  Outcome: Ongoing, Progressing     Problem: Urinary Elimination Impaired (Stroke, Ischemic/Transient Ischemic Attack)  Goal: Effective Urinary Elimination  Outcome: Ongoing, Progressing   Goal Outcome Evaluation:

## 2022-06-08 NOTE — PROGRESS NOTES
Referring Provider: Hospitalist    Reason for follow-up: TIA/stroke     Patient Care Team:  Patricia Steele MD as PCP - General    Subjective .  Patient doing well without any symptoms today    Objective  Lying in bed comfortably     Review of Systems   Constitutional: Negative for fever and malaise/fatigue.   HENT: Negative for ear pain and nosebleeds.    Eyes: Negative for blurred vision and double vision.   Cardiovascular: Negative for chest pain, dyspnea on exertion and palpitations.   Respiratory: Negative for cough and shortness of breath.    Skin: Negative for rash.   Musculoskeletal: Negative for joint pain.   Gastrointestinal: Negative for abdominal pain, nausea and vomiting.   Neurological: Negative for focal weakness and headaches.   Psychiatric/Behavioral: Negative for depression. The patient is not nervous/anxious.    All other systems reviewed and are negative.      Bee venom and Shellfish-derived products    Scheduled Meds:atorvastatin, 40 mg, Oral, Nightly  buPROPion XL, 300 mg, Oral, Daily  cetirizine, 10 mg, Oral, Daily  clobetasol, , Topical, BID  metoprolol succinate XL, 50 mg, Oral, Daily  sodium chloride, 3 mL, Intravenous, Q12H  venlafaxine XR, 225 mg, Oral, Daily      Continuous Infusions:sodium chloride, 100 mL/hr, Last Rate: Stopped (06/07/22 1221)      PRN Meds:.•  acetaminophen **OR** acetaminophen  •  bisacodyl  •  potassium chloride **OR** potassium chloride **OR** potassium chloride  •  sodium chloride  •  sodium chloride  •  sodium chloride  •  tiZANidine        VITAL SIGNS  Vitals:    06/08/22 0521 06/08/22 0830 06/08/22 0956 06/08/22 1453   BP: 112/53  108/55 120/57   BP Location: Left arm  Left arm Left arm   Patient Position: Lying  Lying Lying   Pulse: 59 62 57 64   Resp: 15  15 16   Temp: 98 °F (36.7 °C)  97.9 °F (36.6 °C) 97.9 °F (36.6 °C)   TempSrc: Oral  Oral Oral   SpO2: 96%  97% 99%   Weight: 91.8 kg (202 lb 6.1 oz)      Height:           Flowsheet Rows   "  Flowsheet Row First Filed Value   Admission Height 160 cm (63\") Documented at 06/06/2022 2021   Admission Weight 86.7 kg (191 lb 2.2 oz) Documented at 06/06/2022 2021           TELEMETRY: Sinus rhythm    Physical Exam:  Constitutional:       Appearance: Well-developed.   Eyes:      General: No scleral icterus.     Conjunctiva/sclera: Conjunctivae normal.   HENT:      Head: Normocephalic and atraumatic.   Neck:      Vascular: No carotid bruit or JVD.   Pulmonary:      Effort: Pulmonary effort is normal.      Breath sounds: Normal breath sounds. No wheezing. No rales.   Cardiovascular:      Normal rate. Regular rhythm.   Pulses:     Intact distal pulses.   Abdominal:      General: Bowel sounds are normal.      Palpations: Abdomen is soft.   Musculoskeletal:      Cervical back: Normal range of motion and neck supple. Skin:     General: Skin is warm and dry.      Findings: No rash.   Neurological:      Mental Status: Alert.          Results Review:   I reviewed the patient's new clinical results.  Lab Results (last 24 hours)     Procedure Component Value Units Date/Time    POC Glucose Once [[id number]]  (Normal) Collected: 06/08/22 1132    Specimen: Blood Updated: 06/08/22 1133     Glucose 89 mg/dL      Comment: Serial Number: [id number]:  [id number]       Basic Metabolic Panel [[id number]]  (Abnormal) Collected: 06/08/22 0713    Specimen: Blood Updated: 06/08/22 0826     Glucose 97 mg/dL      BUN 15 mg/dL      Creatinine 0.87 mg/dL      Sodium 142 mmol/L      Potassium 4.1 mmol/L      Chloride 104 mmol/L      CO2 28.0 mmol/L      Calcium 8.5 mg/dL      BUN/Creatinine Ratio 17.2     Anion Gap 10.0 mmol/L      eGFR 74.5 mL/min/1.73      Comment: National Kidney Foundation and American Society of Nephrology (ASN) Task Force recommended calculation based on the Chronic Kidney Disease Epidemiology Collaboration (CKD-EPI) equation refit without adjustment for race.       Narrative:      GFR Normal >60  Chronic Kidney " Disease <60  Kidney Failure <15      CBC & Differential [[id number]]  (Normal) Collected: 06/08/22 0713    Specimen: Blood Updated: 06/08/22 0756    Narrative:      The following orders were created for panel order CBC & Differential.  Procedure                               Abnormality         Status                     ---------                               -----------         ------                     CBC Auto Differential[[id number]]        Normal              Final result                 Please view results for these tests on the individual orders.    CBC Auto Differential [[id number]]  (Normal) Collected: 06/08/22 0713    Specimen: Blood Updated: 06/08/22 0756     WBC 6.30 10*3/mm3      RBC 3.92 10*6/mm3      Hemoglobin 12.0 g/dL      Hematocrit 35.9 %      MCV 91.5 fL      MCH 30.7 pg      MCHC 33.5 g/dL      RDW 13.6 %      RDW-SD 43.3 fl      MPV 7.5 fL      Platelets 273 10*3/mm3      Neutrophil % 51.4 %      Lymphocyte % 36.4 %      Monocyte % 7.2 %      Eosinophil % 4.7 %      Basophil % 0.3 %      Neutrophils, Absolute 3.30 10*3/mm3      Lymphocytes, Absolute 2.30 10*3/mm3      Monocytes, Absolute 0.50 10*3/mm3      Eosinophils, Absolute 0.30 10*3/mm3      Basophils, Absolute 0.00 10*3/mm3      nRBC 0.0 /100 WBC           Imaging Results (Last 24 Hours)     ** No results found for the last 24 hours. **          EKG      I personally viewed and interpreted the patient's EKG/Telemetry data:    ECHOCARDIOGRAM:    STRESS MYOVIEW:    CARDIAC CATHETERIZATION:    OTHER:         Assessment & Plan     Active Problems:    Hyperlipidemia    Hypertension    Migraine headache    Obesity (BMI 30.0-34.9)    TIA (transient ischemic attack)    Hypokalemia    Sinusitis       Patient presented with TIA/strokelike symptoms and has been seen by neurologist  Patient is currently in sinus rhythm and did not have any arrhythmias  Patient will need a event monitor for at least 15 to 30 days to rule out arrhythmias  Patient is  currently on medical therapy including aspirin and Plavix  Patient had an echocardiogram which showed normal LV systolic function    I discussed the patients findings and my recommendations with patient and nurse    Js Garcia MD  06/08/22  16:35 EDT

## 2022-06-09 ENCOUNTER — TRANSITIONAL CARE MANAGEMENT TELEPHONE ENCOUNTER (OUTPATIENT)
Dept: CALL CENTER | Facility: HOSPITAL | Age: 62
End: 2022-06-09

## 2022-06-09 NOTE — OUTREACH NOTE
Call Center TCM Note    Flowsheet Row Responses   [facility] facility patient discharged from? [facility]   Does the patient have one of the following disease processes/diagnoses(primary or secondary)? Stroke (TIA)   TCM attempt successful? No   Unsuccessful attempts Attempt 1          Micaela Coyle MA    6/9/2022, 15:27 EDT

## 2022-06-09 NOTE — CASE MANAGEMENT/SOCIAL WORK
Case Management Discharge Note      Final Note: DC Home.    Provided Post Acute Provider List?: N/A  Provided Post Acute Provider Quality & Resource List?: N/A    Selected Continued Care - Discharged on 6/8/2022 Admission date: 6/6/2022 - Discharge disposition: Home or Self Care            Transportation Services  Private: Car (spouse)    Final Discharge Disposition Code: 01 - home or self-care     Autumn Locke RN, MSN  Care Manager  [phone]

## 2022-06-09 NOTE — OUTREACH NOTE
Call Center TCM Note    Flowsheet Row Responses   [facility] facility patient discharged from? [facility]   Does the patient have one of the following disease processes/diagnoses(primary or secondary)? Stroke (TIA)   TCM attempt successful? No   Unsuccessful attempts Attempt 2          Micaela Coyle MA    6/9/2022, 16:18 EDT

## 2022-06-10 ENCOUNTER — TRANSITIONAL CARE MANAGEMENT TELEPHONE ENCOUNTER (OUTPATIENT)
Dept: CALL CENTER | Facility: HOSPITAL | Age: 62
End: 2022-06-10

## 2022-06-10 LAB — QT INTERVAL: 409 MS

## 2022-06-10 NOTE — OUTREACH NOTE
Call Center TCM Note    Flowsheet Row Responses   [facility] patient discharged from? [facility]   Does the patient have one of the following disease processes/diagnoses(primary or secondary)? Stroke (TIA)   TCM attempt successful? Yes   Call start time 1407   Call end time 1411   Discharge diagnosis TIA (transient ischemic attack)   Person spoke with today (if not patient) and relationship    Meds reviewed with patient/caregiver? Yes   Is the patient having any side effects they believe may be caused by any medication additions or changes? No   Does the patient have all medications ordered at discharge? Yes   Is the patient taking all medications as directed (includes completed medication regime)? Yes   Does the patient have a primary care provider?  Yes   Does the patient have an appointment with their PCP within 7 days of discharge? Yes   Comments regarding PCP HOSP DC FU appt 6/15/22 @ 3pm.    Has the patient kept scheduled appointments due by today? N/A   Has home health visited the patient within 72 hours of discharge? N/A   Psychosocial issues? No   Does the patient require any assistance with activities of daily living such as eating, bathing, dressing, walking, etc.? No   Does the patient have any residual symptoms from stroke/TIA? No   Did the patient receive a copy of their discharge instructions? Yes   Nursing interventions Reviewed instructions with patient   What is the patient's perception of their health status since discharge? Improving   Nursing interventions Nurse provided patient education   Is the patient able to teach back FAST for Stroke? Yes   Is the patient/caregiver able to teach back the risk factors for a stroke? History of TIAs, High Cholesterol, High blood pressure-goal below 120/80   Is the patient/caregiver able to teach back signs and symptoms related to disease process for when to call PCP? Yes   Is the patient/caregiver able to teach back signs and symptoms related to  disease process for when to call 911? Yes   Is the patient/caregiver able to teach back the hierarchy of who to call/visit for symptoms/problems? PCP, Specialist, Home health nurse, Urgent Care, ED, 911 Yes   TCM call completed? Yes   Wrap up additional comments  reports Pt is doing ok at this time.           Sondra Crane RN    6/10/2022, 14:11 EDT

## 2022-06-13 NOTE — PROGRESS NOTES
"Transitional Care Follow Up Visit  Subjective     Sarah Brannon is a 62 y.o. female who presents for a transitional care management visit.    Within 48 business hours after discharge our office contacted her via telephone to coordinate her care and needs.      I reviewed and discussed the details of that call along with the discharge summary, hospital problems, inpatient lab results, inpatient diagnostic studies, and consultation reports with Sarah.     Current outpatient and discharge medications have been reconciled for the patient.  Reviewed by: JULES Schultz      Date of TCM Phone Call 6/8/2022   [facility]   Date of Admission 6/6/2022   Date of Discharge 6/8/2022   Discharge Disposition Home or Self Care     Risk for Readmission (LACE) Score: 3 (6/8/2022  6:01 AM)      History of Present Illness   Course During Hospital Stay:  Hospital course per hospital record: \"Sarah Brannon is a 62 y.o. female who was admitted last night after an episode of slurred speech and trouble communicating.  The patient was admitted to neuro unit where she was monitored.  The patient then underwent a neurologic consultation with Dr. Burdick as well as an MRI showed showing small areas of subacute to acute infarct in the area of the right parietal lobe.  There were some mild chronic vessel changes seen as well.  Patient also had a CT a of her head and neck which failed to demonstrate any etiology for her symptomatology.  The patient was then seen by cardiology and will have a 30-day event monitor placed prior to discharge.  The patient was doing well and was ready for discharge.  All of the patient's findings were discussed in detail with the patient and her .     The patient is a full code at the time of discharge.  The patient's medications are as listed in that section of this report.  The patient should have an event monitor placed for 30 days prior to discharge.  The patient has " "no other pending studies at the time of discharge.  The patient should follow-up with her primary care provider and 1 week's time and with Dr. Garcia in 2 to 3 weeks or per as per his instructions.  The patient is discharged in satisfactory condition.\"     Lipid panel 6/7/22 : ; Trigs 163; HDL 37; LDL 92.   A1C 5.7%. Currently on atorvastatin 40 mg daily from discharge.   Currently wearing Holter monitor; hasn't scheduled with Dr. Garcia yet.   /83 today.  Currently on metoprolol XL 50 mg daily.  Maxzide was d/c'd at discharge.    Has f/u with Neuro next week.    Having loose stools for years; hasn't seen GI.  Was going to have colonoscopy scheduled but then had this stroke.  Currently on Amoxicillin for sinus infection.    Denies any CP; palpitations; SOA; dizziness; headache; trouble with vision.     The following portions of the patient's history were reviewed and updated as appropriate: allergies, current medications, past family history, past medical history, past social history, past surgical history and problem list.    Review of Systems   Constitutional: Negative for appetite change, chills, fatigue and fever.   HENT: Negative for congestion, postnasal drip, rhinorrhea, sinus pressure, sinus pain, sore throat and trouble swallowing.    Eyes: Negative for visual disturbance.   Respiratory: Negative for cough, chest tightness, shortness of breath and wheezing.    Cardiovascular: Negative for chest pain and palpitations.   Gastrointestinal: Positive for diarrhea. Negative for abdominal pain, blood in stool, constipation, nausea and vomiting.   Genitourinary: Negative for dysuria, flank pain, frequency, hematuria and urgency.   Musculoskeletal: Negative for arthralgias and myalgias.   Neurological: Negative for dizziness, tremors, seizures, syncope, facial asymmetry, speech difficulty, weakness, light-headedness, numbness and headaches.   Psychiatric/Behavioral: Negative for confusion and dysphoric " mood. The patient is not nervous/anxious.        Objective   Physical Exam  Vitals reviewed.   Constitutional:       General: She is not in acute distress.     Appearance: Normal appearance.   HENT:      Head: Normocephalic and atraumatic.   Cardiovascular:      Rate and Rhythm: Normal rate and regular rhythm.      Pulses: Normal pulses.      Heart sounds: Normal heart sounds. No murmur heard.  Pulmonary:      Effort: Pulmonary effort is normal. No respiratory distress.      Breath sounds: Normal breath sounds. No wheezing or rhonchi.   Chest:      Chest wall: No tenderness.   Abdominal:      Tenderness: There is no right CVA tenderness or left CVA tenderness.   Musculoskeletal:      Cervical back: Normal range of motion and neck supple. No tenderness.      Right lower leg: No edema.      Left lower leg: No edema.   Skin:     General: Skin is warm and dry.      Findings: No erythema.   Neurological:      General: No focal deficit present.      Mental Status: She is alert and oriented to person, place, and time.      Cranial Nerves: No cranial nerve deficit.   Psychiatric:         Mood and Affect: Mood normal.         Assessment & Plan   Diagnoses and all orders for this visit:    1. TIA (transient ischemic attack) (Primary)  Comments:  Will add lisinopril 2.5 mg daily; BP goal <130/80.    Cont. atorvastatin.  Will repeat lipids in 3 months.   Keep f/u with neuro next week.       2. Essential hypertension  Comments:  Adding lisinopril 2.5 mg daily.   Cont. metoprolol.    Monitor BP at home; goal <130/80.   RTO in 3 mo  Orders:  -     lisinopril (PRINIVIL,ZESTRIL) 2.5 MG tablet; Take 1 tablet by mouth Daily.  Dispense: 30 tablet; Refill: 1    3. Mixed hyperlipidemia  Comments:  Cont. current medication.   Labs again in 3 months.     4. Loose stools  Comments:  Referral to GI.   Orders:  -     Ambulatory Referral to Gastroenterology

## 2022-06-14 ENCOUNTER — HOSPITAL ENCOUNTER (OUTPATIENT)
Dept: RESPIRATORY THERAPY | Facility: HOSPITAL | Age: 62
Discharge: HOME | End: 2022-06-14
Admitting: INTERNAL MEDICINE

## 2022-06-14 DIAGNOSIS — G45.9 TIA (TRANSIENT ISCHEMIC ATTACK): ICD-10-CM

## 2022-06-14 PROCEDURE — 93270 REMOTE 30 DAY ECG REV/REPORT: CPT

## 2022-06-15 ENCOUNTER — APPOINTMENT (OUTPATIENT)
Dept: ULTRASOUND IMAGING | Facility: HOSPITAL | Age: 62
End: 2022-06-15

## 2022-06-15 ENCOUNTER — APPOINTMENT (OUTPATIENT)
Dept: MAMMOGRAPHY | Facility: HOSPITAL | Age: 62
End: 2022-06-15

## 2022-06-15 ENCOUNTER — OFFICE VISIT (OUTPATIENT)
Dept: FAMILY MEDICINE CLINIC | Facility: CLINIC | Age: 62
End: 2022-06-15

## 2022-06-15 VITALS
HEART RATE: 58 BPM | HEIGHT: 63 IN | WEIGHT: 188 LBS | SYSTOLIC BLOOD PRESSURE: 139 MMHG | OXYGEN SATURATION: 94 % | DIASTOLIC BLOOD PRESSURE: 83 MMHG | BODY MASS INDEX: 33.31 KG/M2

## 2022-06-15 DIAGNOSIS — E78.2 MIXED HYPERLIPIDEMIA: ICD-10-CM

## 2022-06-15 DIAGNOSIS — G45.9 TIA (TRANSIENT ISCHEMIC ATTACK): Primary | ICD-10-CM

## 2022-06-15 DIAGNOSIS — R19.5 LOOSE STOOLS: ICD-10-CM

## 2022-06-15 DIAGNOSIS — I10 ESSENTIAL HYPERTENSION: ICD-10-CM

## 2022-06-15 PROCEDURE — 1111F DSCHRG MED/CURRENT MED MERGE: CPT | Performed by: NURSE PRACTITIONER

## 2022-06-15 PROCEDURE — 99495 TRANSJ CARE MGMT MOD F2F 14D: CPT | Performed by: NURSE PRACTITIONER

## 2022-06-15 RX ORDER — LISINOPRIL 2.5 MG/1
2.5 TABLET ORAL DAILY
Qty: 30 TABLET | Refills: 1 | Status: SHIPPED | OUTPATIENT
Start: 2022-06-15 | End: 2022-08-11

## 2022-06-15 RX ORDER — BENZONATATE 100 MG/1
CAPSULE ORAL
COMMUNITY
Start: 2022-06-12 | End: 2022-06-22 | Stop reason: SDUPTHER

## 2022-06-15 RX ORDER — AMOXICILLIN AND CLAVULANATE POTASSIUM 875; 125 MG/1; MG/1
1 TABLET, FILM COATED ORAL EVERY 12 HOURS
COMMUNITY
Start: 2022-06-11 | End: 2022-06-22 | Stop reason: SDUPTHER

## 2022-06-15 NOTE — PROGRESS NOTES
Chief Complaint  Hospital Follow Up Visit (CVA)    Subjective            Sarah Brannon presents to [facility]UR[facility] for Hospital f/u TIA  History of Present Illness     Patient is here for a hospital f/u on TIA. Patient states she was sitting at dinner with spouse talking and then she felt a streak of lighting down her left arm, spouse noticed that she started slurring words and immediately took her to ER, patient states she back to normal in 10 mins. Patient reports no residual effects from stroke    Pt has no residual from the small subacute stroke seen on mri brain  Pt has history of migraine with aura, HTN  No diabetes, no smoking.     Labs and testing reviewed with the pt     30 day heart monitor in place.              ======[facility] VISIT 6/6/22======  CT Angiogram Neck     Result Date: 6/6/2022  Impression:  1. No large vessel occlusion or acute cerebral thrombus. 2. Variant anatomy as discussed above. 3. Carotid artery atherosclerotic vascular disease with no hemodynamically significant stenosis. 4. Additional vascular and nonvascular findings as noted above.  Electronically Signed By-Rei Smiley MD On:6/6/2022 9:38 PM This report was finalized on [id number] by  Rei Smiley MD.     MRI Brain Without Contrast     Result Date: 6/7/2022  Impression:  1. Small areas of acute/subacute infarct in the right parietal lobe. No hemorrhage. 2. Mild chronic small vessel ischemic change and mild generalized parenchymal volume loss. 3. Paranasal sinus mucosal disease with a fluid level in the left maxillary sinus suggesting acute sinusitis.  Electronically Signed By-Leroy Medina MD On:6/7/2022 8:45 AM This report was finalized on [id number] by  Leroy Medina MD.     XR Chest 1 View     Result Date: 6/6/2022  Impression: No active disease.  Electronically Signed By-Rei Smiley MD On:6/6/2022 9:28 PM This report was finalized on [id number] by  Rei Smiley MD.     CT Head Without  Contrast Stroke Protocol     Result Date: 6/6/2022  Impression: IMPRESSION :  1. Volume loss secondary to cerebral atrophy. 2. Suggestion of chronic microvascular ischemia. 3. No acute intracranial findings. 4. Acute and chronic paranasal sinus disease.  Electronically Signed By-eRi Smiley MD On:6/6/2022 8:44 PM This report was finalized on [id number] by  Rei Smiley MD.     CT Angiogram Head w AI Analysis of LVO     Result Date: 6/6/2022  Impression:  1. No large vessel occlusion or acute cerebral thrombus. 2. Variant anatomy as discussed above. 3. Carotid artery atherosclerotic vascular disease with no hemodynamically significant stenosis. 4. Additional vascular and nonvascular findings as noted above.  Electronically Signed By-Rei Smiley MD On:6/6/2022 9:38 PM This report was finalized on [id number] by  Rei Smiley MD.     CT CEREBRAL PERFUSION WITH & WITHOUT CONTRAST     Result Date: 6/6/2022  Impression: Normal study.  Electronically Signed By-Rei Smiley MD On:6/6/2022 9:08 PM This report was finalized on [id number] by  Rei Smiley MD.               History of present illness: Background per H&P: Sarah Brannon is a 62 y.o. year old female who was evaluated in room 270 at [facility]     Source of information is the patient and the medical records     The patient reports that she was in her usual state of health and she was gluing something and suddenly she realized that she could not do much of the stuff with the left upper extremity she said that it felt numb initially but then she could not really control it.  There was no seizure-like activity.  Her  saw her and reported to her that she was talking strange.  The patient states that she to herself was talking fine but her  definitely noted something different.  It concerned them and they decided to come to the hospital and by the time she was in the car all the symptoms had resolved     So when she presented to the  ER her NIH was 0  She had normal CT, CTA and also CTP was done     She does not take aspirin     Incidentally her MRI shows a right parietal infarct and looking at it it looks cortical  Though asymptomatic but she needs cardiac work-up and possible anticoagulation if cardiac sources are found.  My recommendation is MINOO and event monitor or loop recorder     No headaches or migraines or seizures     As per admitting, Sarah Brannon is a very pleasant 62 y.o. female who presented to [facility] on 6/6/2022 complaining of a sudden episode of confusion, slurred speech and left arm numbness tingling and weakness at 7:45 PM tonight.  Patient denies any headache, visual disturbances, chest pain, dyspnea, nausea, or fever.  She does report a sinus infection and some dizziness..   at bedside assisting with history.  He reports the slurred speech and confusion and left arm weakness lasted about 10 minutes and has totally resolved before she arrived to the emergency department.  NIH is currently 0.  She has a past medical history of hypertension hyperlipidemia, migraines depression and seasonal allergies.  Findings today in emergency department include:K 3.2, Cr 1.03, CXR per radiology no acute cardiopulmonary process, EKS sinus rhythm, ,      CT head per radiology  1. Volume loss secondary to cerebral atrophy.  2. Suggestion of chronic microvascular ischemia.  3. No acute intracranial findings.  4. Acute and chronic paranasal sinus disease.     CT head perfusion  Normal study.     CTA head and neck  1. No large vessel occlusion or acute cerebral thrombus.  2. Variant anatomy as discussed above.  3. Carotid artery atherosclerotic vascular disease with no  hemodynamically significant stenosis.  4. Additional vascular and nonvascular findings as noted above.     Neurology was consulted from the emergency department and she will be admitted for further evaluation and treatment.  2D echo in a.m. ordered,  MRI brain ordered.  Patient does have a history of a total knee replacement unclear if MRI compatible.  She reports an intolerance to aspirin (stomach upset) and was given Plavix in the emergency department.  She is on home statin.  - Portions of the above HPI were copied from previous encounters and edited as appropriate. PMH as detailed below    ASSESSMENT/PLAN:  Is a very interesting 62-year-old female with right cortical infarct     Her NIH is 0 and it was 0 when she presented to the ER, and thus she was not alteplase or intervention candidate as CTA was negative     This is a cortical infarct and she needs work-up.  My recommendation is cardiology consultation for MINOO and event monitor or loop recorder     She does not like aspirin and it causes symptoms so I agree with Plavix and Lipitor and modification of risk factors     If we find something on MINOO or event monitor or loop recorder then anticoagulation may be considered     Modification of stroke risk factors:   - Blood pressure should be less than 130/80 outpatient, HbA1c less than 6.5, LDL less than 70; b12>500 and smoking cessation if applicable. We would be grateful if the primary team / primary care physician would keep a close watch on the above targets.  - Stroke education  - Follow up with neurologist of choice        I discussed the patient's findings and my recommendations with patient     Yossi Vicente MD  Family History   Problem Relation Age of Onset   • Heart disease Mother    • Heart disease Father        Past Medical History:   Diagnosis Date   • Arthritis    • Baker's cyst of knee     RIGHT   • Depression    • Hiatal hernia    • History of breast augmentation 04/07/2020   • History of palpitations    • Hyperlipemia    • Hypertension    • Migraine    • PONV (postoperative nausea and vomiting)     SLOW TO WAKE   • Seasonal allergies        Social History     Socioeconomic History   • Marital status:    Tobacco Use   • Smoking  "status: Never Smoker   • Smokeless tobacco: Never Used   Vaping Use   • Vaping Use: Never used   Substance and Sexual Activity   • Alcohol use: No   • Drug use: No   • Sexual activity: Defer         Current Outpatient Medications:   •  atorvastatin (LIPITOR) 40 MG tablet, Take 1 tablet by mouth Every Night for 30 days., Disp: 30 tablet, Rfl: 0  •  buPROPion XL (WELLBUTRIN XL) 300 MG 24 hr tablet, Take 300 mg by mouth every morning., Disp: , Rfl:   •  cetirizine (zyrTEC) 10 MG tablet, TAKE 1 TABLET BY MOUTH EVERY DAY , Disp: 90 tablet, Rfl: 3  •  clobetasol (TEMOVATE) 0.05 % cream, Apply  topically to the appropriate area as directed 2 (Two) Times a Day., Disp: 30 g, Rfl: 1  •  lisinopril (PRINIVIL,ZESTRIL) 2.5 MG tablet, Take 1 tablet by mouth Daily., Disp: 30 tablet, Rfl: 1  •  metoprolol succinate XL (TOPROL-XL) 50 MG 24 hr tablet, TAKE 1 TABLET BY MOUTH EVERY DAY, Disp: 90 tablet, Rfl: 0  •  potassium chloride 10 MEQ CR tablet, , Disp: , Rfl:   •  tiZANidine (ZANAFLEX) 4 MG tablet, Take 1 tablet by mouth At Night As Needed for Muscle Spasms., Disp: 15 tablet, Rfl: 0  •  venlafaxine XR (EFFEXOR-XR) 75 MG 24 hr capsule, Take 3 capsules by mouth Daily for 30 days., Disp: 90 capsule, Rfl: 0    Review of Systems   HENT: Positive for postnasal drip, sinus pressure and tinnitus.    Respiratory: Positive for cough.    Gastrointestinal: Positive for vomiting (due to post nasal drip).   Musculoskeletal: Positive for back pain.   Allergic/Immunologic: Positive for environmental allergies and food allergies.   Neurological: Positive for headaches.   Psychiatric/Behavioral: Positive for sleep disturbance.   All other systems reviewed and are negative.           Objective   Vital Signs:   /96   Pulse 53   Temp 98.5 °F (36.9 °C) (Temporal)   Ht 160 cm (63\")   Wt 83.5 kg (184 lb)   BMI 32.59 kg/m²     Physical Exam  Vitals reviewed.   Cardiovascular:      Pulses: Normal pulses.   Pulmonary:      Effort: Pulmonary " effort is normal.   Neurological:      General: No focal deficit present.      Mental Status: She is alert and oriented to person, place, and time.      Gait: Gait is intact.   Psychiatric:         Mood and Affect: Mood normal.        Result Review :                Neurologic Exam     Mental Status   Oriented to person, place, and time.   Attention: normal.   Level of consciousness: alert    Gait, Coordination, and Reflexes     Gait  Gait: normal    Reflexes   Reflexes 2+ except as noted.              Assessment and Plan    Diagnoses and all orders for this visit:    1. History of stroke (Primary)    2. Migraine with aura and without status migrainosus, not intractable    3. Migraine aura without headache    work up for stroke reviewed with pt  Pt to monitor bp goal <135/75 or less,     Recommend 81mg asa daily  Complete heart monitor    I spent 30 minutes caring for Sarah on this date of service. This time includes time spent by me in the following activities:reviewing tests, performing a medically appropriate examination and/or evaluation , counseling and educating the patient/family/caregiver and documenting information in the medical record  Follow Up   Return if symptoms worsen or fail to improve.  Patient was given instructions and counseling regarding her condition or for health maintenance advice. Please see specific information pulled into the AVS if appropriate.         This document has been electronically signed by Joseph Seipel, MD on June 22, 2022 08:33 EDT

## 2022-06-17 ENCOUNTER — READMISSION MANAGEMENT (OUTPATIENT)
Dept: CALL CENTER | Facility: HOSPITAL | Age: 62
End: 2022-06-17

## 2022-06-17 NOTE — OUTREACH NOTE
General Surgery Week 2 Survey    Flowsheet Row Responses   [facility] facility patient discharged from? [facility]   Does the patient have one of the following disease processes/diagnoses(primary or secondary)? Stroke (TIA)          LATESHA MILAN - Registered Nurse

## 2022-06-17 NOTE — OUTREACH NOTE
Stroke Week 2 Survey    Flowsheet Row Responses   [facility] facility patient discharged from? [facility]   Does the patient have one of the following disease processes/diagnoses(primary or secondary)? Stroke (TIA)   Week 2 attempt successful? No   Unsuccessful attempts Attempt 1          LATESHA MILAN - Registered Nurse

## 2022-06-21 ENCOUNTER — READMISSION MANAGEMENT (OUTPATIENT)
Dept: CALL CENTER | Facility: HOSPITAL | Age: 62
End: 2022-06-21

## 2022-06-21 NOTE — OUTREACH NOTE
Stroke Week 2 Survey    Flowsheet Row Responses   [facility] facility patient discharged from? [facility]   Does the patient have one of the following disease processes/diagnoses(primary or secondary)? Stroke (TIA)   Week 2 attempt successful? No   Unsuccessful attempts Attempt 2   Discharge diagnosis TIA (transient ischemic attack)          SERGEI MILAN - Registered Nurse

## 2022-06-22 ENCOUNTER — OFFICE VISIT (OUTPATIENT)
Dept: NEUROLOGY | Facility: CLINIC | Age: 62
End: 2022-06-22

## 2022-06-22 VITALS
TEMPERATURE: 98.5 F | HEART RATE: 53 BPM | HEIGHT: 63 IN | BODY MASS INDEX: 32.6 KG/M2 | WEIGHT: 184 LBS | DIASTOLIC BLOOD PRESSURE: 96 MMHG | SYSTOLIC BLOOD PRESSURE: 144 MMHG

## 2022-06-22 DIAGNOSIS — Z86.73 HISTORY OF STROKE: Primary | ICD-10-CM

## 2022-06-22 DIAGNOSIS — G43.109 MIGRAINE AURA WITHOUT HEADACHE: ICD-10-CM

## 2022-06-22 DIAGNOSIS — G43.109 MIGRAINE WITH AURA AND WITHOUT STATUS MIGRAINOSUS, NOT INTRACTABLE: ICD-10-CM

## 2022-06-22 PROCEDURE — 99214 OFFICE O/P EST MOD 30 MIN: CPT | Performed by: PSYCHIATRY & NEUROLOGY

## 2022-06-22 RX ORDER — POTASSIUM CHLORIDE 750 MG/1
TABLET, FILM COATED, EXTENDED RELEASE ORAL
COMMUNITY
Start: 2022-06-21 | End: 2022-09-25

## 2022-06-27 ENCOUNTER — TELEPHONE (OUTPATIENT)
Dept: CARDIOLOGY | Facility: CLINIC | Age: 62
End: 2022-06-27

## 2022-06-27 NOTE — TELEPHONE ENCOUNTER
Parisa calling with urgent reading, patient has new onset Atrial Fibrillation detected on event monitor yesterday. Please advise     Cardiac Event Monitor (06/14/2022 10:28)

## 2022-06-28 NOTE — TELEPHONE ENCOUNTER
Patient has never been seen in my office.  She has an appointment on July 7.  Please see if she can come earlier than that

## 2022-07-06 LAB
Lab: 20
TOAL ENROLLMENT DAYS: 15

## 2022-07-06 PROCEDURE — 93272 ECG/REVIEW INTERPRET ONLY: CPT | Performed by: INTERNAL MEDICINE

## 2022-07-07 ENCOUNTER — OFFICE VISIT (OUTPATIENT)
Dept: CARDIOLOGY | Facility: CLINIC | Age: 62
End: 2022-07-07

## 2022-07-07 ENCOUNTER — TELEPHONE (OUTPATIENT)
Dept: FAMILY MEDICINE CLINIC | Facility: CLINIC | Age: 62
End: 2022-07-07

## 2022-07-07 ENCOUNTER — TELEPHONE (OUTPATIENT)
Dept: CARDIOLOGY | Facility: CLINIC | Age: 62
End: 2022-07-07

## 2022-07-07 VITALS
HEART RATE: 58 BPM | WEIGHT: 183 LBS | DIASTOLIC BLOOD PRESSURE: 88 MMHG | SYSTOLIC BLOOD PRESSURE: 152 MMHG | HEIGHT: 63 IN | BODY MASS INDEX: 32.43 KG/M2 | OXYGEN SATURATION: 99 %

## 2022-07-07 DIAGNOSIS — G45.9 TIA (TRANSIENT ISCHEMIC ATTACK): ICD-10-CM

## 2022-07-07 DIAGNOSIS — I10 PRIMARY HYPERTENSION: ICD-10-CM

## 2022-07-07 DIAGNOSIS — E78.2 MIXED HYPERLIPIDEMIA: ICD-10-CM

## 2022-07-07 DIAGNOSIS — I48.0 PAROXYSMAL ATRIAL FIBRILLATION: Primary | ICD-10-CM

## 2022-07-07 PROCEDURE — 99214 OFFICE O/P EST MOD 30 MIN: CPT | Performed by: INTERNAL MEDICINE

## 2022-07-07 NOTE — TELEPHONE ENCOUNTER
Hub staff attempted to follow warm transfer process and was unsuccessful     Caller: Sarah Brannon    Relationship to patient: Self    Best call back number: [phone]    Patient is needing: CALL BACK REYNALDO, IF NO ANSWER CAN LEAVE A MESSAGE

## 2022-07-07 NOTE — PROGRESS NOTES
"    Subjective:     Encounter Date:07/07/2022      Patient ID: Sarah Brannon is a 62 y.o. female.    Chief Complaint:  History of Present Illness 62-year-old white female with history of recent TIA hypertension hyperlipidemia presents to my office for follow-up.  Patient has been having symptoms of palpitations and has had Holter monitor which showed intermittent episodes of atrial fibrillation.  Patient does not have any chest pains or shortness of breath at rest or exertion but no complaint of any PND orthopnea.  No dizziness syncope or swelling of the feet.  Patient has been taking her medicines regularly.  She does not smoke.    The following portions of the patient's history were reviewed and updated as appropriate: allergies, current medications, past family history, past medical history, past social history, past surgical history and problem list.  Past Medical History:   Diagnosis Date   • Arthritis    • Baker's cyst of knee     RIGHT   • Depression    • Hiatal hernia    • History of breast augmentation 04/07/2020   • History of palpitations    • Hyperlipemia    • Hypertension    • Migraine    • PONV (postoperative nausea and vomiting)     SLOW TO WAKE   • Seasonal allergies      Past Surgical History:   Procedure Laterality Date   • BREAST AUGMENTATION Bilateral 06/2020   • ENDOMETRIAL ABLATION      menustrual   • FOOT SURGERY      rt and lt foot   • LEEP     • MOLE REMOVAL     • NASAL SEPTUM SURGERY     • TOTAL KNEE ARTHROPLASTY Right 8/23/2016    Procedure: RT TOTAL KNEE ARTHROPLASTY WITH ROBERTO NAVIGATION, depuy;  Surgeon: Hakan Mccann MD;  Location: [facility];  Service:      /88 (BP Location: Left arm, Patient Position: Sitting)   Pulse 58   Ht 160 cm (63\")   Wt 83 kg (183 lb)   SpO2 99%   BMI 32.42 kg/m²   Family History   Problem Relation Age of Onset   • Heart disease Mother    • Heart disease Father        Current Outpatient Medications:   •  atorvastatin (LIPITOR) 40 MG " tablet, Take 1 tablet by mouth Every Night for 30 days., Disp: 30 tablet, Rfl: 0  •  buPROPion XL (WELLBUTRIN XL) 300 MG 24 hr tablet, Take 300 mg by mouth every morning., Disp: , Rfl:   •  cetirizine (zyrTEC) 10 MG tablet, TAKE 1 TABLET BY MOUTH EVERY DAY , Disp: 90 tablet, Rfl: 3  •  clobetasol (TEMOVATE) 0.05 % cream, Apply  topically to the appropriate area as directed 2 (Two) Times a Day., Disp: 30 g, Rfl: 1  •  lisinopril (PRINIVIL,ZESTRIL) 2.5 MG tablet, Take 1 tablet by mouth Daily., Disp: 30 tablet, Rfl: 1  •  metoprolol succinate XL (TOPROL-XL) 50 MG 24 hr tablet, TAKE 1 TABLET BY MOUTH EVERY DAY, Disp: 90 tablet, Rfl: 0  •  potassium chloride 10 MEQ CR tablet, , Disp: , Rfl:   •  tiZANidine (ZANAFLEX) 4 MG tablet, Take 1 tablet by mouth At Night As Needed for Muscle Spasms., Disp: 15 tablet, Rfl: 0  •  venlafaxine XR (EFFEXOR-XR) 75 MG 24 hr capsule, Take 3 capsules by mouth Daily for 30 days., Disp: 90 capsule, Rfl: 0  Allergies   Allergen Reactions   • Bee Venom Swelling   • Shellfish-Derived Products Swelling     Lips became swollen.     Social History     Socioeconomic History   • Marital status:    Tobacco Use   • Smoking status: Never Smoker   • Smokeless tobacco: Never Used   Vaping Use   • Vaping Use: Never used   Substance and Sexual Activity   • Alcohol use: No   • Drug use: No   • Sexual activity: Defer     Review of Systems   Constitutional: Negative for fever and malaise/fatigue.   Cardiovascular: Positive for palpitations. Negative for chest pain and dyspnea on exertion.   Respiratory: Negative for cough and shortness of breath.    Skin: Negative for rash.   Gastrointestinal: Negative for abdominal pain, nausea and vomiting.   Neurological: Negative for focal weakness and headaches.   All other systems reviewed and are negative.             Objective:     Constitutional:       Appearance: Well-developed.   Eyes:      General: No scleral icterus.     Conjunctiva/sclera: Conjunctivae  normal.   HENT:      Head: Normocephalic and atraumatic.   Neck:      Vascular: No carotid bruit or JVD.   Pulmonary:      Effort: Pulmonary effort is normal.      Breath sounds: Normal breath sounds. No wheezing. No rales.   Cardiovascular:      Normal rate. Regular rhythm.   Pulses:     Intact distal pulses.   Abdominal:      General: Bowel sounds are normal.      Palpations: Abdomen is soft.   Musculoskeletal:      Cervical back: Normal range of motion and neck supple. Skin:     General: Skin is warm and dry.      Findings: No rash.   Neurological:      Mental Status: Alert.       Procedures    Lab Review:         MDM  1 paroxysmal fibrillation  Patient had a Holter monitor which showed paroxysmal fibrillation hence I will start her on either Eliquis or Xarelto and continue metoprolol  2.  TIA  Patient had recent TIA but was on aspirin but I will switch to Eliquis or Xarelto  3.  Hypertension  Patient blood pressure currently stable on metoprolol and lisinopril    4 hyperlipidemia  Patient is on atorvastatin.    Patient's previous medical records, labs, and EKG were reviewed and discussed with the patient at today's visit.

## 2022-07-07 NOTE — TELEPHONE ENCOUNTER
Caller: AUDREY SURESH    Relationship:      Best call back number: [phone]    What medication are you requesting: XARELTO  20 MG          AUDREY CALLED INTO THE HUB SAID THAT MORGAN HAD SEEN  EARLIER TODAY.  TOLD THEM TO FIGURE OUT WHICH MEDICATION WOULD BE COVERED MORE BY THEIR INSURANCE. AUDREY WANTED TO LET  KNOW THAT THE XARELTO 20 MG WAS THE MEDICATION THAT THEY PICKED. PLEASE GIVE AUDREY A CALL BACK IF NEEDED.THANKS!

## 2022-07-08 RX ORDER — ATORVASTATIN CALCIUM 40 MG/1
40 TABLET, FILM COATED ORAL NIGHTLY
Qty: 90 TABLET | Refills: 3 | Status: SHIPPED | OUTPATIENT
Start: 2022-07-08 | End: 2022-08-07

## 2022-07-08 NOTE — TELEPHONE ENCOUNTER
Caller: Sarah Brannon    Relationship: Self    Best call back number: [phone]      Requested Prescriptions:   Requested Prescriptions     Pending Prescriptions Disp Refills   • atorvastatin (LIPITOR) 40 MG tablet 30 tablet 0     Sig: Take 1 tablet by mouth Every Night for 30 days.        Pharmacy where request should be sent: [facility] [id number] [place] [id number] [facility] - [phone]  - [phone] FX     Additional details provided by patient: HAS 4 PILLS LEFT.    Does the patient have less than a 3 day supply:  [x] Yes  [] No    Pat Rooney, PCT   07/08/22 11:50 EDT

## 2022-07-11 ENCOUNTER — HOSPITAL ENCOUNTER (OUTPATIENT)
Dept: ULTRASOUND IMAGING | Facility: HOSPITAL | Age: 62
Discharge: HOME | End: 2022-07-11

## 2022-07-11 ENCOUNTER — HOSPITAL ENCOUNTER (OUTPATIENT)
Dept: MAMMOGRAPHY | Facility: HOSPITAL | Age: 62
Discharge: HOME | End: 2022-07-11

## 2022-07-11 DIAGNOSIS — Z12.31 BREAST CANCER SCREENING BY MAMMOGRAM: ICD-10-CM

## 2022-07-11 DIAGNOSIS — R10.2 PELVIC PAIN: ICD-10-CM

## 2022-07-11 PROCEDURE — 76856 US EXAM PELVIC COMPLETE: CPT

## 2022-07-11 PROCEDURE — 77067 SCR MAMMO BI INCL CAD: CPT

## 2022-07-11 PROCEDURE — 76830 TRANSVAGINAL US NON-OB: CPT

## 2022-07-11 PROCEDURE — 77063 BREAST TOMOSYNTHESIS BI: CPT

## 2022-07-12 ENCOUNTER — TELEPHONE (OUTPATIENT)
Dept: FAMILY MEDICINE CLINIC | Facility: CLINIC | Age: 62
End: 2022-07-12

## 2022-07-12 DIAGNOSIS — D25.9 UTERINE LEIOMYOMA, UNSPECIFIED LOCATION: ICD-10-CM

## 2022-07-12 DIAGNOSIS — R10.2 PELVIC PAIN: Primary | ICD-10-CM

## 2022-07-12 NOTE — TELEPHONE ENCOUNTER
----- Message from Lucero Case MA sent at 7/12/2022  2:34 PM EDT -----  Patient notified. Can you place referral for gyn? Has been several years since she has seen someone.

## 2022-07-27 RX ORDER — METOPROLOL SUCCINATE 50 MG/1
TABLET, EXTENDED RELEASE ORAL
Qty: 90 TABLET | Refills: 0 | Status: SHIPPED | OUTPATIENT
Start: 2022-07-27 | End: 2022-12-29

## 2022-07-27 RX ORDER — ERGOCALCIFEROL 1.25 MG/1
CAPSULE ORAL
Qty: 12 CAPSULE | Refills: 0 | Status: SHIPPED | OUTPATIENT
Start: 2022-07-27 | End: 2022-08-18

## 2022-07-27 RX ORDER — ACYCLOVIR 800 MG/1
TABLET ORAL
Qty: 90 TABLET | Refills: 0 | Status: SHIPPED | OUTPATIENT
Start: 2022-07-27

## 2022-08-11 DIAGNOSIS — I10 ESSENTIAL HYPERTENSION: ICD-10-CM

## 2022-08-11 RX ORDER — LISINOPRIL 2.5 MG/1
TABLET ORAL
Qty: 30 TABLET | Refills: 0 | Status: SHIPPED | OUTPATIENT
Start: 2022-08-11 | End: 2022-08-18 | Stop reason: DRUGHIGH

## 2022-08-16 ENCOUNTER — OFFICE (OUTPATIENT)
Dept: URBAN - METROPOLITAN AREA CLINIC 64 | Facility: CLINIC | Age: 62
End: 2022-08-16

## 2022-08-16 VITALS
HEART RATE: 66 BPM | SYSTOLIC BLOOD PRESSURE: 134 MMHG | HEIGHT: 63 IN | WEIGHT: 185 LBS | DIASTOLIC BLOOD PRESSURE: 79 MMHG

## 2022-08-16 DIAGNOSIS — R19.7 DIARRHEA, UNSPECIFIED: ICD-10-CM

## 2022-08-16 PROCEDURE — 99204 OFFICE O/P NEW MOD 45 MIN: CPT | Performed by: INTERNAL MEDICINE

## 2022-08-17 ENCOUNTER — TELEPHONE (OUTPATIENT)
Dept: CARDIOLOGY | Facility: CLINIC | Age: 62
End: 2022-08-17

## 2022-08-17 NOTE — TELEPHONE ENCOUNTER
[facility]  JAMAICA TAYLOR MD  PHONE: [phone]  FAX: [phone]  COLONOSCOPY  SCHEDULED 9/16/22      GIVEN TO DR.MANCHI'S PEPE.

## 2022-08-18 ENCOUNTER — OFFICE VISIT (OUTPATIENT)
Dept: FAMILY MEDICINE CLINIC | Facility: CLINIC | Age: 62
End: 2022-08-18

## 2022-08-18 VITALS
HEART RATE: 77 BPM | HEIGHT: 63 IN | OXYGEN SATURATION: 97 % | WEIGHT: 186 LBS | DIASTOLIC BLOOD PRESSURE: 89 MMHG | BODY MASS INDEX: 32.96 KG/M2 | SYSTOLIC BLOOD PRESSURE: 153 MMHG

## 2022-08-18 DIAGNOSIS — I10 PRIMARY HYPERTENSION: Primary | ICD-10-CM

## 2022-08-18 DIAGNOSIS — J01.10 ACUTE FRONTAL SINUSITIS, RECURRENCE NOT SPECIFIED: ICD-10-CM

## 2022-08-18 DIAGNOSIS — R73.9 HYPERGLYCEMIA: ICD-10-CM

## 2022-08-18 PROCEDURE — 99213 OFFICE O/P EST LOW 20 MIN: CPT | Performed by: FAMILY MEDICINE

## 2022-08-18 RX ORDER — DOXYCYCLINE HYCLATE 100 MG/1
100 CAPSULE ORAL 2 TIMES DAILY
Qty: 20 CAPSULE | Refills: 0 | Status: SHIPPED | OUTPATIENT
Start: 2022-08-18 | End: 2022-08-28

## 2022-08-18 RX ORDER — LISINOPRIL 10 MG/1
10 TABLET ORAL DAILY
Qty: 90 TABLET | Refills: 3 | Status: SHIPPED | OUTPATIENT
Start: 2022-08-18

## 2022-08-18 NOTE — PATIENT INSTRUCTIONS
Finish the lisinopril 2.5 mg by taking 4 pills a day until you run out  No fried foods and limit pasta, bread, and sweets.  Limit caffeine and salt

## 2022-08-18 NOTE — PROGRESS NOTES
Subjective   Sarah Brannon is a 62 y.o. female.     History of Present Illness       Patient verbalized consent to the visit recording.    The patient presents today with concerns about her blood pressure. She states she has not been checking her blood pressure at home. Denies chest pain or near syncopal episodes. She states she had a mini stroke on 06/06/2022 and was at [facility] for 3 days. She was taken off the triamterene hydrochlorothiazide, and was put on the metoprolol succinate 1 pill a day. She was placed on lisinopril in addition to the metoprolol. She denies chest pain or feeling as if she is going to pass out.     She reports she was treated for a sinus infection recently and was treated with Augmentin. She had some improvement, but is still having some thick foul tasting postnasal drainage that is causing her to gag and cough. She also related that she is scheduled for her colonoscopy next month.    The following portions of the patient's history were reviewed and updated as appropriate: allergies, current medications, past family history, past medical history, past social history, past surgical history, and problem list.  Past Medical History:   Diagnosis Date   • Arthritis    • Baker's cyst of knee     RIGHT   • Depression    • Hiatal hernia    • History of breast augmentation 04/07/2020   • History of palpitations    • Hyperlipemia    • Hypertension    • Migraine    • PONV (postoperative nausea and vomiting)     SLOW TO WAKE   • Seasonal allergies      Past Surgical History:   Procedure Laterality Date   • BREAST AUGMENTATION Bilateral 06/2020   • ENDOMETRIAL ABLATION      menustrual   • FOOT SURGERY      rt and lt foot   • LEEP     • MOLE REMOVAL     • NASAL SEPTUM SURGERY     • TOTAL KNEE ARTHROPLASTY Right 8/23/2016    Procedure: RT TOTAL KNEE ARTHROPLASTY WITH ROBERTO NAVIGATION, depuy;  Surgeon: Hakan Mccann MD;  Location: [facility] OR;  Service:      Family History   Problem Relation Age  "of Onset   • Heart disease Mother    • Heart disease Father      Social History     Socioeconomic History   • Marital status:    Tobacco Use   • Smoking status: Never Smoker   • Smokeless tobacco: Never Used   Vaping Use   • Vaping Use: Never used   Substance and Sexual Activity   • Alcohol use: No   • Drug use: No   • Sexual activity: Defer         Current Outpatient Medications:   •  acyclovir (ZOVIRAX) 800 MG tablet, TAKE 1/2 TABLET BY MOUTH ONE TIME A DAY, Disp: 90 tablet, Rfl: 0  •  ASPIRIN 81 PO, Take  by mouth., Disp: , Rfl:   •  buPROPion XL (WELLBUTRIN XL) 300 MG 24 hr tablet, Take 300 mg by mouth every morning., Disp: , Rfl:   •  cetirizine (zyrTEC) 10 MG tablet, TAKE 1 TABLET BY MOUTH EVERY DAY , Disp: 90 tablet, Rfl: 3  •  clobetasol (TEMOVATE) 0.05 % cream, Apply  topically to the appropriate area as directed 2 (Two) Times a Day., Disp: 30 g, Rfl: 1  •  doxycycline (VIBRAMYCIN) 100 MG capsule, Take 1 capsule by mouth 2 (Two) Times a Day for 10 days., Disp: 20 capsule, Rfl: 0  •  lisinopril (PRINIVIL,ZESTRIL) 10 MG tablet, Take 1 tablet by mouth Daily. For blood pressure, Disp: 90 tablet, Rfl: 3  •  metoprolol succinate XL (TOPROL-XL) 50 MG 24 hr tablet, TAKE 1 TABLET BY MOUTH EVERY DAY, Disp: 90 tablet, Rfl: 0  •  potassium chloride 10 MEQ CR tablet, , Disp: , Rfl:   •  rivaroxaban (XARELTO) 20 MG tablet, Take 1 tablet by mouth Daily., Disp: 30 tablet, Rfl: 5  •  tiZANidine (ZANAFLEX) 4 MG tablet, Take 1 tablet by mouth At Night As Needed for Muscle Spasms., Disp: 15 tablet, Rfl: 0  •  venlafaxine XR (EFFEXOR-XR) 75 MG 24 hr capsule, Take 3 capsules by mouth Daily for 30 days., Disp: 90 capsule, Rfl: 0    Review of Systems   ROS done and noted in HPI  /89 (BP Location: Left arm, Patient Position: Sitting, Cuff Size: Large Adult)   Pulse 77   Ht 160 cm (63\")   Wt 84.4 kg (186 lb)   SpO2 97%   BMI 32.95 kg/m²       Objective   Physical Exam  Vitals (Blood pressure is elevated. ) " reviewed.   Constitutional:       General: She is not in acute distress.     Appearance: She is obese.   Cardiovascular:      Rate and Rhythm: Normal rate and regular rhythm.      Heart sounds: No murmur heard.  Pulmonary:      Comments: Lungs are clear to auscultation bilaterally.  Musculoskeletal:      Right lower leg: No edema.      Left lower leg: No edema.           Assessment & Plan   Problems Addressed this Visit        Cardiac and Vasculature    Primary hypertension - Primary    Relevant Medications    lisinopril (PRINIVIL,ZESTRIL) 10 MG tablet       ENT    Sinusitis       Endocrine and Metabolic    Hyperglycemia      Diagnoses       Codes Comments    Primary hypertension    -  Primary ICD-10-CM: I10  ICD-9-CM: 401.9     Acute frontal sinusitis, recurrence not specified     ICD-10-CM: J01.10  ICD-9-CM: 461.1     Hyperglycemia     ICD-10-CM: R73.9  ICD-9-CM: 790.29         1. Hypertension  Still poorly controlled. I will increase her lisinopril from 2.5 up to 10 mg and she will continue the metoprolol.    2. Sinusitis  I put her on doxycycline 100 mg b.i.d. for 10 days.    3. Hyperglycemia  That was found during her hospitalization with an A1c of 5.7. I counseled her on the need for dietary changes and she voiced understanding. She has a follow-up appointment with me next month. She was counseled on the need for weight loss and caffeine reduction.      Transcribed from ambient dictation for Patricia Steele MD by Mary Rahman.  08/18/22   09:57 EDT    Patient verbalized consent to the visit recording.

## 2022-08-22 DIAGNOSIS — I10 ESSENTIAL HYPERTENSION: ICD-10-CM

## 2022-08-23 RX ORDER — LISINOPRIL 2.5 MG/1
TABLET ORAL
Qty: 30 TABLET | Refills: 0 | OUTPATIENT
Start: 2022-08-23

## 2022-09-14 ENCOUNTER — OFFICE VISIT (OUTPATIENT)
Dept: FAMILY MEDICINE CLINIC | Facility: CLINIC | Age: 62
End: 2022-09-14

## 2022-09-14 VITALS
HEIGHT: 63 IN | BODY MASS INDEX: 32.96 KG/M2 | HEART RATE: 52 BPM | WEIGHT: 186 LBS | SYSTOLIC BLOOD PRESSURE: 127 MMHG | OXYGEN SATURATION: 98 % | DIASTOLIC BLOOD PRESSURE: 85 MMHG | TEMPERATURE: 98 F

## 2022-09-14 DIAGNOSIS — E66.9 OBESITY (BMI 30.0-34.9): ICD-10-CM

## 2022-09-14 DIAGNOSIS — Z00.01 ENCOUNTER FOR GENERAL ADULT MEDICAL EXAMINATION WITH ABNORMAL FINDINGS: Primary | ICD-10-CM

## 2022-09-14 PROCEDURE — 99396 PREV VISIT EST AGE 40-64: CPT | Performed by: FAMILY MEDICINE

## 2022-09-14 RX ORDER — ATORVASTATIN CALCIUM 40 MG/1
40 TABLET, FILM COATED ORAL NIGHTLY
COMMUNITY
Start: 2022-09-12

## 2022-09-14 RX ORDER — LEVOFLOXACIN 500 MG/1
500 TABLET, FILM COATED ORAL DAILY
Qty: 10 TABLET | Refills: 0 | Status: SHIPPED | OUTPATIENT
Start: 2022-09-14

## 2022-09-14 NOTE — PROGRESS NOTES
Subjective   Sarah Brannon is a 62 y.o. female.     Here for cpe  Labs were done in June  Had colonoscopy scheduled for Friday but has rescheduled sec to post nasal drainage  She says she is feeling a little sad right now she used to live in [place] for 11 years and is still mourning the recent death of the Aaron       The following portions of the patient's history were reviewed and updated as appropriate: allergies, current medications, past family history, past medical history, past social history, past surgical history, and problem list.  Past Medical History:   Diagnosis Date   • Arthritis    • Baker's cyst of knee     RIGHT   • Depression    • Hiatal hernia    • History of breast augmentation 04/07/2020   • History of palpitations    • Hyperlipemia    • Hypertension    • Migraine    • PONV (postoperative nausea and vomiting)     SLOW TO WAKE   • Seasonal allergies      Past Surgical History:   Procedure Laterality Date   • BREAST AUGMENTATION Bilateral 06/2020   • ENDOMETRIAL ABLATION      menustrual   • FOOT SURGERY      rt and lt foot   • LEEP     • MOLE REMOVAL     • NASAL SEPTUM SURGERY     • TOTAL KNEE ARTHROPLASTY Right 8/23/2016    Procedure: RT TOTAL KNEE ARTHROPLASTY WITH ROBERTO NAVIGATION, depuy;  Surgeon: Hakan Mccann MD;  Location: [facility];  Service:      Family History   Problem Relation Age of Onset   • Heart disease Mother    • Heart disease Father      Social History     Socioeconomic History   • Marital status:    Tobacco Use   • Smoking status: Never Smoker   • Smokeless tobacco: Never Used   Vaping Use   • Vaping Use: Never used   Substance and Sexual Activity   • Alcohol use: No   • Drug use: No   • Sexual activity: Defer         Current Outpatient Medications:   •  acyclovir (ZOVIRAX) 800 MG tablet, TAKE 1/2 TABLET BY MOUTH ONE TIME A DAY, Disp: 90 tablet, Rfl: 0  •  ASPIRIN 81 PO, Take  by mouth., Disp: , Rfl:   •  atorvastatin (LIPITOR) 40 MG tablet, Take 40 mg by  "mouth Every Night., Disp: , Rfl:   •  buPROPion XL (WELLBUTRIN XL) 300 MG 24 hr tablet, Take 300 mg by mouth every morning., Disp: , Rfl:   •  cetirizine (zyrTEC) 10 MG tablet, TAKE 1 TABLET BY MOUTH EVERY DAY , Disp: 90 tablet, Rfl: 3  •  clobetasol (TEMOVATE) 0.05 % cream, Apply  topically to the appropriate area as directed 2 (Two) Times a Day., Disp: 30 g, Rfl: 1  •  lisinopril (PRINIVIL,ZESTRIL) 10 MG tablet, Take 1 tablet by mouth Daily. For blood pressure, Disp: 90 tablet, Rfl: 3  •  metoprolol succinate XL (TOPROL-XL) 50 MG 24 hr tablet, TAKE 1 TABLET BY MOUTH EVERY DAY, Disp: 90 tablet, Rfl: 0  •  potassium chloride 10 MEQ CR tablet, , Disp: , Rfl:   •  rivaroxaban (XARELTO) 20 MG tablet, Take 1 tablet by mouth Daily., Disp: 30 tablet, Rfl: 5  •  tiZANidine (ZANAFLEX) 4 MG tablet, Take 1 tablet by mouth At Night As Needed for Muscle Spasms., Disp: 15 tablet, Rfl: 0  •  levoFLOXacin (Levaquin) 500 MG tablet, Take 1 tablet by mouth Daily., Disp: 10 tablet, Rfl: 0  •  venlafaxine XR (EFFEXOR-XR) 75 MG 24 hr capsule, Take 3 capsules by mouth Daily for 30 days., Disp: 90 capsule, Rfl: 0    Review of Systems   Constitutional: Negative.    HENT: Positive for congestion, postnasal drip and sinus pressure.    Respiratory: Negative.    Cardiovascular: Negative.    Gastrointestinal: Negative.    Genitourinary: Negative.    Neurological: Negative.    Psychiatric/Behavioral: Negative.      /85 (BP Location: Left arm, Patient Position: Sitting, Cuff Size: Large Adult)   Pulse 52   Temp 98 °F (36.7 °C) (Temporal)   Ht 160 cm (63\")   Wt 84.4 kg (186 lb)   SpO2 98%   Breastfeeding No   BMI 32.95 kg/m²       Objective   Physical Exam  Vitals and nursing note reviewed.   Constitutional:       Appearance: Normal appearance. She is well-developed and well-groomed. She is obese.   HENT:      Head: Normocephalic and atraumatic.      Right Ear: Tympanic membrane, ear canal and external ear normal.      Left Ear: " Tympanic membrane, ear canal and external ear normal.      Nose: Nose normal.      Mouth/Throat:      Mouth: Mucous membranes are moist.      Pharynx: Oropharynx is clear.   Eyes:      Extraocular Movements: Extraocular movements intact.      Conjunctiva/sclera: Conjunctivae normal.      Pupils: Pupils are equal, round, and reactive to light.   Neck:      Thyroid: No thyromegaly.      Vascular: No carotid bruit.   Cardiovascular:      Rate and Rhythm: Normal rate and regular rhythm.      Pulses: Normal pulses.      Heart sounds: Normal heart sounds.   Pulmonary:      Effort: Pulmonary effort is normal.      Breath sounds: Normal breath sounds.   Chest:   Breasts:      Right: No supraclavicular adenopathy.      Left: No supraclavicular adenopathy.       Abdominal:      General: Abdomen is flat. Bowel sounds are normal.      Palpations: Abdomen is soft. There is no hepatomegaly, splenomegaly or mass.      Tenderness: There is no abdominal tenderness.      Hernia: No hernia is present.   Musculoskeletal:      Cervical back: Normal range of motion and neck supple.      Right lower leg: No edema.      Left lower leg: No edema.   Lymphadenopathy:      Cervical: No cervical adenopathy.      Upper Body:      Right upper body: No supraclavicular adenopathy.      Left upper body: No supraclavicular adenopathy.   Skin:     General: Skin is warm and dry.      Findings: No lesion or rash.   Neurological:      General: No focal deficit present.      Mental Status: She is alert.      Motor: Motor function is intact.      Deep Tendon Reflexes: Reflexes are normal and symmetric.   Psychiatric:         Attention and Perception: Attention normal.         Mood and Affect: Mood normal.         Behavior: Behavior is cooperative.       Lab Results   Component Value Date    WBC 6.30 06/08/2022    HGB 12.0 06/08/2022    HCT 35.9 06/08/2022    MCV 91.5 06/08/2022     06/08/2022     Lab Results   Component Value Date    GLUCOSE 97  06/08/2022    BUN 15 06/08/2022    CREATININE 0.87 06/08/2022    EGFRIFNONA 52 (L) 09/16/2021    BCR 17.2 06/08/2022    K 4.1 06/08/2022    CO2 28.0 06/08/2022    CALCIUM 8.5 (L) 06/08/2022    ALBUMIN 3.80 06/07/2022    LABIL2 1.2 02/05/2019    AST 15 06/07/2022    ALT 11 06/07/2022     Lab Results   Component Value Date    TSH 1.280 06/07/2022     Lab Results   Component Value Date    CHOL 157 06/07/2022    TRIG 163 (H) 06/07/2022    HDL 37 (L) 06/07/2022    LDL 92 06/07/2022     Lab Results   Component Value Date    HGBA1C 5.7 (H) 06/07/2022         Assessment & Plan   Problems Addressed this Visit        Endocrine and Metabolic    Obesity (BMI 30.0-34.9)       Health Encounters    Encounter for general adult medical examination with abnormal findings - Primary      Diagnoses       Codes Comments    Encounter for general adult medical examination with abnormal findings    -  Primary ICD-10-CM: Z00.01  ICD-9-CM: V70.0     Obesity (BMI 30.0-34.9)     ICD-10-CM: E66.9  ICD-9-CM: 278.00         She was counseled on the need for weight loss  Labs were done a few months ago and reviewed with her  She is up-to-date on her mammogram  I will start her on Levaquin for her sinuses since she is already been on Augmentin and doxycycline  She will reschedule her colonoscopy  She plans to get her COVID booster in the next few weeks

## 2022-09-14 NOTE — PATIENT INSTRUCTIONS
Keep working to lose weight through healthy eating and exercise. Extra rest and fluids; limit dairy for a few days

## 2022-09-25 DIAGNOSIS — I10 ESSENTIAL HYPERTENSION: ICD-10-CM

## 2022-09-25 RX ORDER — POTASSIUM CHLORIDE 750 MG/1
TABLET, FILM COATED, EXTENDED RELEASE ORAL
Qty: 90 TABLET | Refills: 3 | Status: SHIPPED | OUTPATIENT
Start: 2022-09-25

## 2022-09-25 RX ORDER — CETIRIZINE HYDROCHLORIDE 10 MG/1
TABLET ORAL
Qty: 90 TABLET | Refills: 3 | Status: SHIPPED | OUTPATIENT
Start: 2022-09-25

## 2022-09-26 RX ORDER — LISINOPRIL 2.5 MG/1
TABLET ORAL
Qty: 30 TABLET | Refills: 0 | OUTPATIENT
Start: 2022-09-26

## 2022-11-16 ENCOUNTER — OFFICE (OUTPATIENT)
Dept: URBAN - METROPOLITAN AREA CLINIC 64 | Facility: CLINIC | Age: 62
End: 2022-11-16
Payer: COMMERCIAL

## 2022-11-16 ENCOUNTER — TELEPHONE (OUTPATIENT)
Dept: CARDIOLOGY | Facility: CLINIC | Age: 62
End: 2022-11-16

## 2022-11-16 VITALS
WEIGHT: 191 LBS | DIASTOLIC BLOOD PRESSURE: 90 MMHG | SYSTOLIC BLOOD PRESSURE: 163 MMHG | HEIGHT: 63 IN | HEART RATE: 51 BPM

## 2022-11-16 DIAGNOSIS — K59.00 CONSTIPATION, UNSPECIFIED: ICD-10-CM

## 2022-11-16 DIAGNOSIS — K58.2 MIXED IRRITABLE BOWEL SYNDROME: ICD-10-CM

## 2022-11-16 DIAGNOSIS — Z12.11 ENCOUNTER FOR SCREENING FOR MALIGNANT NEOPLASM OF COLON: ICD-10-CM

## 2022-11-16 PROCEDURE — 99213 OFFICE O/P EST LOW 20 MIN: CPT | Performed by: NURSE PRACTITIONER

## 2022-11-16 NOTE — TELEPHONE ENCOUNTER
[facility]  Dr. Plunkett  Colonoscopy  Scheduled 1/6/1/23  Phone# [phone] option 2  Fax# [phone]          Placed on Dr. Garcia's MA desk

## 2022-12-29 RX ORDER — METOPROLOL SUCCINATE 50 MG/1
TABLET, EXTENDED RELEASE ORAL
Qty: 90 TABLET | Refills: 0 | Status: SHIPPED | OUTPATIENT
Start: 2022-12-29 | End: 2023-03-24

## 2023-01-06 ENCOUNTER — OFFICE (OUTPATIENT)
Dept: URBAN - METROPOLITAN AREA PATHOLOGY 4 | Facility: PATHOLOGY | Age: 63
End: 2023-01-06
Payer: COMMERCIAL

## 2023-01-06 ENCOUNTER — ON CAMPUS - OUTPATIENT (OUTPATIENT)
Dept: URBAN - METROPOLITAN AREA HOSPITAL 2 | Facility: HOSPITAL | Age: 63
End: 2023-01-06
Payer: COMMERCIAL

## 2023-01-06 VITALS
OXYGEN SATURATION: 98 % | DIASTOLIC BLOOD PRESSURE: 79 MMHG | DIASTOLIC BLOOD PRESSURE: 91 MMHG | RESPIRATION RATE: 18 BRPM | DIASTOLIC BLOOD PRESSURE: 96 MMHG | DIASTOLIC BLOOD PRESSURE: 75 MMHG | DIASTOLIC BLOOD PRESSURE: 77 MMHG | HEART RATE: 71 BPM | WEIGHT: 191 LBS | OXYGEN SATURATION: 95 % | SYSTOLIC BLOOD PRESSURE: 127 MMHG | HEART RATE: 66 BPM | SYSTOLIC BLOOD PRESSURE: 119 MMHG | DIASTOLIC BLOOD PRESSURE: 95 MMHG | OXYGEN SATURATION: 99 % | SYSTOLIC BLOOD PRESSURE: 131 MMHG | DIASTOLIC BLOOD PRESSURE: 61 MMHG | RESPIRATION RATE: 22 BRPM | HEART RATE: 57 BPM | SYSTOLIC BLOOD PRESSURE: 124 MMHG | HEIGHT: 63 IN | SYSTOLIC BLOOD PRESSURE: 136 MMHG | SYSTOLIC BLOOD PRESSURE: 155 MMHG | SYSTOLIC BLOOD PRESSURE: 142 MMHG | HEART RATE: 68 BPM | SYSTOLIC BLOOD PRESSURE: 172 MMHG | SYSTOLIC BLOOD PRESSURE: 116 MMHG | HEART RATE: 72 BPM | TEMPERATURE: 97.5 F | RESPIRATION RATE: 16 BRPM | DIASTOLIC BLOOD PRESSURE: 94 MMHG | HEART RATE: 69 BPM | OXYGEN SATURATION: 89 %

## 2023-01-06 DIAGNOSIS — K62.1 RECTAL POLYP: ICD-10-CM

## 2023-01-06 DIAGNOSIS — Z12.11 ENCOUNTER FOR SCREENING FOR MALIGNANT NEOPLASM OF COLON: ICD-10-CM

## 2023-01-06 DIAGNOSIS — D12.4 BENIGN NEOPLASM OF DESCENDING COLON: ICD-10-CM

## 2023-01-06 DIAGNOSIS — D12.0 BENIGN NEOPLASM OF CECUM: ICD-10-CM

## 2023-01-06 DIAGNOSIS — K57.30 DIVERTICULOSIS OF LARGE INTESTINE WITHOUT PERFORATION OR ABS: ICD-10-CM

## 2023-01-06 LAB
GI HISTOLOGY: A. UNSPECIFIED: (no result)
GI HISTOLOGY: B. UNSPECIFIED: (no result)
GI HISTOLOGY: C. UNSPECIFIED: (no result)
GI HISTOLOGY: PDF REPORT: (no result)

## 2023-01-06 PROCEDURE — 45385 COLONOSCOPY W/LESION REMOVAL: CPT | Mod: 33 | Performed by: INTERNAL MEDICINE

## 2023-01-06 PROCEDURE — 88305 TISSUE EXAM BY PATHOLOGIST: CPT | Performed by: INTERNAL MEDICINE

## 2023-01-17 ENCOUNTER — OFFICE VISIT (OUTPATIENT)
Dept: CARDIOLOGY | Facility: CLINIC | Age: 63
End: 2023-01-17
Payer: COMMERCIAL

## 2023-01-17 VITALS
HEART RATE: 55 BPM | DIASTOLIC BLOOD PRESSURE: 70 MMHG | BODY MASS INDEX: 34.2 KG/M2 | OXYGEN SATURATION: 98 % | WEIGHT: 193 LBS | SYSTOLIC BLOOD PRESSURE: 134 MMHG | HEIGHT: 63 IN

## 2023-01-17 DIAGNOSIS — E78.2 MIXED HYPERLIPIDEMIA: ICD-10-CM

## 2023-01-17 DIAGNOSIS — I48.0 PAROXYSMAL ATRIAL FIBRILLATION: Primary | ICD-10-CM

## 2023-01-17 DIAGNOSIS — I10 PRIMARY HYPERTENSION: ICD-10-CM

## 2023-01-17 PROCEDURE — 99213 OFFICE O/P EST LOW 20 MIN: CPT | Performed by: INTERNAL MEDICINE

## 2023-01-17 RX ORDER — RIVAROXABAN 20 MG/1
TABLET, FILM COATED ORAL
Qty: 30 TABLET | Refills: 0 | Status: SHIPPED | OUTPATIENT
Start: 2023-01-17 | End: 2023-02-19

## 2023-01-17 NOTE — PROGRESS NOTES
"    Subjective:     Encounter Date:01/17/2023      Patient ID: Sarah Brannon is a 62 y.o. female.    Chief Complaint:  History of Present Illness 62-year-old white female with history of paroxysmal fibrillation history of hypertension hyperlipidemia presents to office for follow-up.  Patient is currently stable without any symptoms of chest pain or shortness of breath at rest on exertion.  No complains any PND orthopnea.  No palpitation dizziness syncope she has some swelling of the feet.  She is taking her medicines regularly.  She does not smoke    The following portions of the patient's history were reviewed and updated as appropriate: allergies, current medications, past family history, past medical history, past social history, past surgical history and problem list.  Past Medical History:   Diagnosis Date   • Arthritis    • Baker's cyst of knee     RIGHT   • Depression    • Hiatal hernia    • History of breast augmentation 04/07/2020   • History of palpitations    • Hyperlipemia    • Hypertension    • Migraine    • PONV (postoperative nausea and vomiting)     SLOW TO WAKE   • Seasonal allergies      Past Surgical History:   Procedure Laterality Date   • BREAST AUGMENTATION Bilateral 06/2020   • COLONOSCOPY Bilateral 01/06/2023   • ENDOMETRIAL ABLATION      menustrual   • FOOT SURGERY      rt and lt foot   • LEEP     • MOLE REMOVAL     • NASAL SEPTUM SURGERY     • TOTAL KNEE ARTHROPLASTY Right 08/23/2016    Procedure: RT TOTAL KNEE ARTHROPLASTY WITH ROBERTO NAVIGATION, depuy;  Surgeon: Hakan Mccann MD;  Location: [facility];  Service:      /70   Pulse 55   Ht 160 cm (62.99\")   Wt 87.5 kg (193 lb)   SpO2 98%   BMI 34.20 kg/m²   Family History   Problem Relation Age of Onset   • Heart disease Mother    • Heart disease Father        Current Outpatient Medications:   •  acyclovir (ZOVIRAX) 800 MG tablet, TAKE 1/2 TABLET BY MOUTH ONE TIME A DAY, Disp: 90 tablet, Rfl: 0  •  ASPIRIN 81 PO, Take "  by mouth., Disp: , Rfl:   •  atorvastatin (LIPITOR) 40 MG tablet, Take 40 mg by mouth Every Night., Disp: , Rfl:   •  buPROPion XL (WELLBUTRIN XL) 300 MG 24 hr tablet, Take 300 mg by mouth every morning., Disp: , Rfl:   •  cetirizine (zyrTEC) 10 MG tablet, TAKE 1 TABLET BY MOUTH EVERY DAY, Disp: 90 tablet, Rfl: 3  •  clobetasol (TEMOVATE) 0.05 % cream, Apply  topically to the appropriate area as directed 2 (Two) Times a Day., Disp: 30 g, Rfl: 1  •  levoFLOXacin (Levaquin) 500 MG tablet, Take 1 tablet by mouth Daily., Disp: 10 tablet, Rfl: 0  •  lisinopril (PRINIVIL,ZESTRIL) 10 MG tablet, Take 1 tablet by mouth Daily. For blood pressure, Disp: 90 tablet, Rfl: 3  •  metoprolol succinate XL (TOPROL-XL) 50 MG 24 hr tablet, TAKE 1 TABLET BY MOUTH EVERY DAY, Disp: 90 tablet, Rfl: 0  •  potassium chloride 10 MEQ CR tablet, TAKE 1 TABLET BY MOUTH EVERY DAY, Disp: 90 tablet, Rfl: 3  •  tiZANidine (ZANAFLEX) 4 MG tablet, Take 1 tablet by mouth At Night As Needed for Muscle Spasms., Disp: 15 tablet, Rfl: 0  •  Xarelto 20 MG tablet, TAKE 1 TABLET BY MOUTH EVERY DAY, Disp: 30 tablet, Rfl: 0  •  venlafaxine XR (EFFEXOR-XR) 75 MG 24 hr capsule, Take 3 capsules by mouth Daily for 30 days., Disp: 90 capsule, Rfl: 0  Allergies   Allergen Reactions   • Bee Venom Swelling   • Shellfish-Derived Products Swelling     Lips became swollen.     Social History     Socioeconomic History   • Marital status:    Tobacco Use   • Smoking status: Never   • Smokeless tobacco: Never   Vaping Use   • Vaping Use: Never used   Substance and Sexual Activity   • Alcohol use: No   • Drug use: No   • Sexual activity: Defer     Review of Systems   Constitutional: Negative for malaise/fatigue.   Cardiovascular: Positive for leg swelling. Negative for chest pain, dyspnea on exertion and palpitations.   Respiratory: Negative for cough and shortness of breath.    Gastrointestinal: Negative for abdominal pain, nausea and vomiting.   Neurological: Negative  for dizziness, focal weakness, headaches, light-headedness and numbness.   All other systems reviewed and are negative.             Objective:     Constitutional:       Appearance: Well-developed.   Eyes:      General: No scleral icterus.     Conjunctiva/sclera: Conjunctivae normal.   HENT:      Head: Normocephalic and atraumatic.   Neck:      Vascular: No carotid bruit or JVD.   Pulmonary:      Effort: Pulmonary effort is normal.      Breath sounds: Normal breath sounds. No wheezing. No rales.   Cardiovascular:      Normal rate. Regular rhythm.   Pulses:     Intact distal pulses.   Abdominal:      General: Bowel sounds are normal.      Palpations: Abdomen is soft.   Musculoskeletal:      Cervical back: Normal range of motion and neck supple. Skin:     General: Skin is warm and dry.      Findings: No rash.   Neurological:      Mental Status: Alert.       Procedures    Lab Review:         MDM  1.  Paroxysmal atrial fibrillation  Patient is currently in sinus rhythm and on medical therapy with metoprolol and also Xarelto for anticoagulation  2.  Hypertension  Patient blood pressure currently stable on metoprolol and lisinopril  3.  Hyperlipidemia  Pains on Lipitor and the lipid levels are well within normal limit    Patient's previous medical records, labs, and EKG were reviewed and discussed with the patient at today's visit.

## 2023-01-17 NOTE — TELEPHONE ENCOUNTER
Rx Refill Note  Requested Prescriptions     Pending Prescriptions Disp Refills   • Xarelto 20 MG tablet [Pharmacy Med Name: Xarelto Oral Tablet 20 MG] 30 tablet 0     Sig: TAKE 1 TABLET BY MOUTH EVERY DAY      Last office visit with prescribing clinician: 7/7/2022   Last telemedicine visit with prescribing clinician: 1/17/2023   Next office visit with prescribing clinician: 1/17/2023                         Would you like a call back once the refill request has been completed: [] Yes [] No    If the office needs to give you a call back, can they leave a voicemail: [] Yes [] No    Mari Nguyen MA  01/17/23, 08:01 EST

## 2023-02-19 RX ORDER — RIVAROXABAN 20 MG/1
TABLET, FILM COATED ORAL
Qty: 90 TABLET | Refills: 3 | Status: SHIPPED | OUTPATIENT
Start: 2023-02-19

## 2023-02-19 NOTE — TELEPHONE ENCOUNTER
Rx Refill Note  Requested Prescriptions     Pending Prescriptions Disp Refills   • Xarelto 20 MG tablet [Pharmacy Med Name: Xarelto Oral Tablet 20 MG] 90 tablet 3     Sig: TAKE 1 TABLET BY MOUTH EVERY DAY      Last office visit with prescribing clinician: 1/17/2023   Last telemedicine visit with prescribing clinician: 7/25/2023   Next office visit with prescribing clinician: 7/25/2023                         Would you like a call back once the refill request has been completed: [] Yes [] No    If the office needs to give you a call back, can they leave a voicemail: [] Yes [] No    Edgardo Metcalf MA  02/19/23, 07:10 EST

## 2023-02-22 ENCOUNTER — OFFICE (OUTPATIENT)
Dept: URBAN - METROPOLITAN AREA CLINIC 64 | Facility: CLINIC | Age: 63
End: 2023-02-22

## 2023-02-22 VITALS
WEIGHT: 194 LBS | DIASTOLIC BLOOD PRESSURE: 73 MMHG | HEART RATE: 58 BPM | HEIGHT: 63 IN | SYSTOLIC BLOOD PRESSURE: 138 MMHG

## 2023-02-22 DIAGNOSIS — K63.5 POLYP OF COLON: ICD-10-CM

## 2023-02-22 DIAGNOSIS — R12 HEARTBURN: ICD-10-CM

## 2023-02-22 DIAGNOSIS — K59.00 CONSTIPATION, UNSPECIFIED: ICD-10-CM

## 2023-02-22 PROCEDURE — 99213 OFFICE O/P EST LOW 20 MIN: CPT | Performed by: NURSE PRACTITIONER

## 2023-03-24 RX ORDER — METOPROLOL SUCCINATE 50 MG/1
TABLET, EXTENDED RELEASE ORAL
Qty: 90 TABLET | Refills: 0 | Status: SHIPPED | OUTPATIENT
Start: 2023-03-24

## 2023-04-12 ENCOUNTER — HOSPITAL ENCOUNTER (EMERGENCY)
Facility: HOSPITAL | Age: 63
Discharge: HOME | End: 2023-04-12
Attending: EMERGENCY MEDICINE | Admitting: EMERGENCY MEDICINE
Payer: COMMERCIAL

## 2023-04-12 VITALS
HEIGHT: 63 IN | BODY MASS INDEX: 35.59 KG/M2 | DIASTOLIC BLOOD PRESSURE: 70 MMHG | RESPIRATION RATE: 18 BRPM | HEART RATE: 50 BPM | SYSTOLIC BLOOD PRESSURE: 127 MMHG | OXYGEN SATURATION: 98 % | WEIGHT: 200.84 LBS | TEMPERATURE: 97.3 F

## 2023-04-12 DIAGNOSIS — R04.0 ANTERIOR EPISTAXIS: Primary | ICD-10-CM

## 2023-04-12 LAB
APTT PPP: 43.9 SECONDS (ref 61–76.5)
BASOPHILS # BLD AUTO: 0 10*3/MM3 (ref 0–0.2)
BASOPHILS NFR BLD AUTO: 0.4 % (ref 0–1.5)
DEPRECATED RDW RBC AUTO: 50.8 FL (ref 37–54)
EOSINOPHIL # BLD AUTO: 0.3 10*3/MM3 (ref 0–0.4)
EOSINOPHIL NFR BLD AUTO: 4.6 % (ref 0.3–6.2)
ERYTHROCYTE [DISTWIDTH] IN BLOOD BY AUTOMATED COUNT: 14.9 % (ref 12.3–15.4)
HCT VFR BLD AUTO: 40.4 % (ref 34–46.6)
HGB BLD-MCNC: 13.4 G/DL (ref 12–15.9)
INR PPP: 1.22 (ref 0.93–1.1)
LYMPHOCYTES # BLD AUTO: 2.3 10*3/MM3 (ref 0.7–3.1)
LYMPHOCYTES NFR BLD AUTO: 31.1 % (ref 19.6–45.3)
MCH RBC QN AUTO: 30.5 PG (ref 26.6–33)
MCHC RBC AUTO-ENTMCNC: 33.1 G/DL (ref 31.5–35.7)
MCV RBC AUTO: 92.1 FL (ref 79–97)
MONOCYTES # BLD AUTO: 0.5 10*3/MM3 (ref 0.1–0.9)
MONOCYTES NFR BLD AUTO: 7 % (ref 5–12)
NEUTROPHILS NFR BLD AUTO: 4.2 10*3/MM3 (ref 1.7–7)
NEUTROPHILS NFR BLD AUTO: 56.9 % (ref 42.7–76)
NRBC BLD AUTO-RTO: 0 /100 WBC (ref 0–0.2)
PLATELET # BLD AUTO: 293 10*3/MM3 (ref 140–450)
PMV BLD AUTO: 7.9 FL (ref 6–12)
PROTHROMBIN TIME: 12.4 SECONDS (ref 9.6–11.7)
RBC # BLD AUTO: 4.39 10*6/MM3 (ref 3.77–5.28)
WBC NRBC COR # BLD: 7.4 10*3/MM3 (ref 3.4–10.8)

## 2023-04-12 PROCEDURE — 36415 COLL VENOUS BLD VENIPUNCTURE: CPT

## 2023-04-12 PROCEDURE — 96372 THER/PROPH/DIAG INJ SC/IM: CPT

## 2023-04-12 PROCEDURE — 85025 COMPLETE CBC W/AUTO DIFF WBC: CPT | Performed by: EMERGENCY MEDICINE

## 2023-04-12 PROCEDURE — 25010000002 HYDROMORPHONE 1 MG/ML SOLUTION: Performed by: EMERGENCY MEDICINE

## 2023-04-12 PROCEDURE — 99283 EMERGENCY DEPT VISIT LOW MDM: CPT

## 2023-04-12 PROCEDURE — C9046 COCAINE HCL NASAL SOLUTION: HCPCS | Performed by: EMERGENCY MEDICINE

## 2023-04-12 PROCEDURE — 85730 THROMBOPLASTIN TIME PARTIAL: CPT | Performed by: EMERGENCY MEDICINE

## 2023-04-12 PROCEDURE — 25010000002 COCAINE HCL 40 MG/ML SOLUTION: Performed by: EMERGENCY MEDICINE

## 2023-04-12 PROCEDURE — 85610 PROTHROMBIN TIME: CPT | Performed by: EMERGENCY MEDICINE

## 2023-04-12 PROCEDURE — 63710000001 ONDANSETRON ODT 4 MG TABLET DISPERSIBLE: Performed by: EMERGENCY MEDICINE

## 2023-04-12 RX ORDER — ONDANSETRON 4 MG/1
4 TABLET, ORALLY DISINTEGRATING ORAL ONCE
Status: COMPLETED | OUTPATIENT
Start: 2023-04-12 | End: 2023-04-12

## 2023-04-12 RX ORDER — OXYCODONE HYDROCHLORIDE AND ACETAMINOPHEN 5; 325 MG/1; MG/1
TABLET ORAL
Qty: 12 TABLET | Refills: 0 | Status: SHIPPED | OUTPATIENT
Start: 2023-04-12

## 2023-04-12 RX ORDER — COCAINE HYDROCHLORIDE 40 MG/ML
SOLUTION NASAL ONCE
Status: DISCONTINUED | OUTPATIENT
Start: 2023-04-12 | End: 2023-04-12

## 2023-04-12 RX ORDER — COCAINE HYDROCHLORIDE 40 MG/ML
SOLUTION NASAL ONCE
Status: COMPLETED | OUTPATIENT
Start: 2023-04-12 | End: 2023-04-12

## 2023-04-12 RX ORDER — CEFUROXIME AXETIL 250 MG/1
250 TABLET ORAL 2 TIMES DAILY
Qty: 10 TABLET | Refills: 0 | Status: SHIPPED | OUTPATIENT
Start: 2023-04-12

## 2023-04-12 RX ADMIN — COCAINE HYDROCHLORIDE NASAL 160 MG: 40 SOLUTION TOPICAL at 04:45

## 2023-04-12 RX ADMIN — SILVER NITRATE APPLICATORS 1 APPLICATION: 25; 75 STICK TOPICAL at 06:05

## 2023-04-12 RX ADMIN — PHENYLEPHRINE HYDROCHLORIDE 2 SPRAY: 0.5 SPRAY NASAL at 04:45

## 2023-04-12 RX ADMIN — ONDANSETRON 4 MG: 4 TABLET, ORALLY DISINTEGRATING ORAL at 06:31

## 2023-04-12 RX ADMIN — HYDROMORPHONE HYDROCHLORIDE 1 MG: 1 INJECTION, SOLUTION INTRAMUSCULAR; INTRAVENOUS; SUBCUTANEOUS at 06:32

## 2023-04-12 RX ADMIN — SILVER NITRATE APPLICATORS 1 APPLICATION: 25; 75 STICK TOPICAL at 06:24

## 2023-04-12 NOTE — ED PROVIDER NOTES
Subjective   History of Present Illness  63-year-old female complaining of right-sided epistaxis.  The patient states that she takes blood thinner and was forcefully blowing her nose when the bleeding started.  The patient states that she did have small amount of blood on the left side later as she tried to apply pressure.  The patient was presented dabbing at her nose and stated that was how she had applied pressure.  She reports no recent fever or chills.  She denies choking or syncope.  She states that she believes that she has environmental allergens        Review of Systems   Constitutional: Positive for fatigue. Negative for chills and fever.   HENT: Positive for nosebleeds. Negative for sinus pressure and sinus pain.    Skin: Negative for rash.   Neurological: Negative for syncope.   Hematological: Does not bruise/bleed easily.       Past Medical History:   Diagnosis Date   • Arthritis    • Baker's cyst of knee     RIGHT   • Depression    • Hiatal hernia    • History of breast augmentation 04/07/2020   • History of palpitations    • Hyperlipemia    • Hypertension    • Migraine    • PONV (postoperative nausea and vomiting)     SLOW TO WAKE   • Seasonal allergies        Allergies   Allergen Reactions   • Bee Venom Swelling   • Shellfish-Derived Products Swelling     Lips became swollen.       Past Surgical History:   Procedure Laterality Date   • BREAST AUGMENTATION Bilateral 06/2020   • COLONOSCOPY Bilateral 01/06/2023   • ENDOMETRIAL ABLATION      menustrual   • FOOT SURGERY      rt and lt foot   • LEEP     • MOLE REMOVAL     • NASAL SEPTUM SURGERY     • TOTAL KNEE ARTHROPLASTY Right 08/23/2016    Procedure: RT TOTAL KNEE ARTHROPLASTY WITH ROBERTO NAVIGATION, depuy;  Surgeon: Hakan Mccann MD;  Location: [facility];  Service:        Family History   Problem Relation Age of Onset   • Heart disease Mother    • Heart disease Father        Social History     Socioeconomic History   • Marital status:     Tobacco Use   • Smoking status: Never   • Smokeless tobacco: Never   Vaping Use   • Vaping Use: Never used   Substance and Sexual Activity   • Alcohol use: No   • Drug use: No   • Sexual activity: Defer           Objective   Physical Exam  Alert Justin Coma Scale 15   HEENT: Pupils equal and reactive to light. Conjunctivae are not injected. Normal tympanic membranes.  The patient did not have posterior bleeding identified.  There was 2 to 3 mm area of excoriation on the anterior septum that was briskly bleeding there is surrounding area of irritation neck: Supple. Midline trachea. No JVD. No goiter.   Chest: Clear and equal breath sounds bilaterally, regular rate and rhythm without murmur or rub.   Abdomen: Positive bowel sounds, nontender, nondistended. No rebound or peritoneal signs. No CVA tenderness.   Extremities no clubbing. cyanosis or edema. Motor sensory exam is normal. The full range of motion is intact   Skin: Warm and dry, no rashes or petechia.   Lymphatic: No regional lymphadenopathy. No calf pain, swelling or Homans sign    Procedures  The patient had clot suctioned from the nose.  Patient then had a pledget soaked with cocaine 4% and Cheng-Synephrine in the nose.  This was then removed.  Silver nitrate sticks were used to cauterize the bleeding area with success.  The patient did not tolerate placement of a inflatable nasal balloon and began swinging her arms and pulling at the balloon.  The patient then had placement of a Rhino Rocket type not inflatable sponge.  This slimmer device was marginally tolerated by the patient although she continued to be highly mobile and was spitting saliva.  There is no active posterior hemorrhage identified.  The patient was given hydromorphone IM along with Zofran         ED Course      Labs Reviewed   PROTIME-INR - Abnormal; Notable for the following components:       Result Value    Protime 12.4 (*)     INR 1.22 (*)     All other components within normal limits    APTT - Abnormal; Notable for the following components:    PTT 43.9 (*)     All other components within normal limits   CBC WITH AUTO DIFFERENTIAL - Normal   CBC AND DIFFERENTIAL    Narrative:     The following orders were created for panel order CBC & Differential.  Procedure                               Abnormality         Status                     ---------                               -----------         ------                     CBC Auto Differential[[id number]]        Normal              Final result                 Please view results for these tests on the individual orders.     Medications   phenylephrine (GIOVANNI-SYNEPHRINE) 0.5 % nasal spray 2 spray (2 sprays Nasal Given 4/12/23 0445)   Cocaine HCl 40 MG/ML nasal solution (160 mg Topical Given 4/12/23 0445)   silver nitrate 75-25 % applicator 1 application (1 application Topical Given 4/12/23 0605)   silver nitrate 75-25 % applicator 1 application (1 application Topical Given 4/12/23 0624)   HYDROmorphone (DILAUDID) injection 1 mg (1 mg Intramuscular Given 4/12/23 0632)   ondansetron ODT (ZOFRAN-ODT) disintegrating tablet 4 mg (4 mg Oral Given 4/12/23 0631)                                          Medical Decision Making  Patient stated that she could not tolerate any larger device in her nose.  The chances of rebleeding were discussed with her as was the limitation of using the preferred device.  The patient will be discharged a prescription for Ceftin and oxycodone.  The patient was concerned about the possibility of postprocedural pain.  The patient was encouraged to follow-up closely with ENT and was given referral.  The patient and her  were agreeable to this plan of treatment    Amount and/or Complexity of Data Reviewed  Independent Historian: spouse  Labs: ordered. Decision-making details documented in ED Course.      Risk  OTC drugs.  Prescription drug management.  Parenteral controlled substances.      Complete epistaxis control was noted  at time of discharge    Final diagnoses:   Anterior epistaxis       ED Disposition  ED Disposition     ED Disposition   Discharge    Condition   Stable    Comment   --             No follow-up provider specified.       Medication List      New Prescriptions    cefuroxime 250 MG tablet  Commonly known as: CEFTIN  Take 1 tablet by mouth 2 (Two) Times a Day.     oxyCODONE-acetaminophen 5-325 MG per tablet  Commonly known as: PERCOCET  One half or 1 p.o. every 6 hours as needed pain           Where to Get Your Medications      These medications were sent to [id number] - [facility] [place] - [address]  - [phone] [facility] [phone]   [address] [place] [address]    Phone: [phone]   · cefuroxime 250 MG tablet  · oxyCODONE-acetaminophen 5-325 MG per tablet          Micah Solis MD  04/12/23 0681

## 2023-04-12 NOTE — DISCHARGE INSTRUCTIONS
Rest, no work, next 2 days  Elevate head today  Apply ice packs for 15 minutes out of each hour while awake for the next 24 hours  Apply firm pressure to the nose for 15 minutes for any additional bleeding  Medication as directed  You can also use Tylenol as needed for discomfort  Your hemoglobin was 13.4 this morning

## 2023-05-04 ENCOUNTER — HOSPITAL ENCOUNTER (EMERGENCY)
Facility: HOSPITAL | Age: 63
Discharge: HOME | End: 2023-05-04
Attending: EMERGENCY MEDICINE
Payer: COMMERCIAL

## 2023-05-04 VITALS
TEMPERATURE: 98 F | RESPIRATION RATE: 20 BRPM | SYSTOLIC BLOOD PRESSURE: 175 MMHG | WEIGHT: 195.55 LBS | HEART RATE: 73 BPM | OXYGEN SATURATION: 98 % | DIASTOLIC BLOOD PRESSURE: 82 MMHG | BODY MASS INDEX: 34.65 KG/M2 | HEIGHT: 63 IN

## 2023-05-04 DIAGNOSIS — R04.0 EPISTAXIS: Primary | ICD-10-CM

## 2023-05-04 LAB
APTT PPP: 35.1 SECONDS (ref 61–76.5)
BASOPHILS # BLD AUTO: 0 10*3/MM3 (ref 0–0.2)
BASOPHILS # BLD AUTO: 0 10*3/MM3 (ref 0–0.2)
BASOPHILS NFR BLD AUTO: 0.3 % (ref 0–1.5)
BASOPHILS NFR BLD AUTO: 0.4 % (ref 0–1.5)
DEPRECATED RDW RBC AUTO: 46.8 FL (ref 37–54)
DEPRECATED RDW RBC AUTO: 48.1 FL (ref 37–54)
EOSINOPHIL # BLD AUTO: 0.2 10*3/MM3 (ref 0–0.4)
EOSINOPHIL # BLD AUTO: 0.2 10*3/MM3 (ref 0–0.4)
EOSINOPHIL NFR BLD AUTO: 1.6 % (ref 0.3–6.2)
EOSINOPHIL NFR BLD AUTO: 2.9 % (ref 0.3–6.2)
ERYTHROCYTE [DISTWIDTH] IN BLOOD BY AUTOMATED COUNT: 14.2 % (ref 12.3–15.4)
ERYTHROCYTE [DISTWIDTH] IN BLOOD BY AUTOMATED COUNT: 14.5 % (ref 12.3–15.4)
GLUCOSE BLDC GLUCOMTR-MCNC: 112 MG/DL (ref 70–105)
HCT VFR BLD AUTO: 40.7 % (ref 34–46.6)
HCT VFR BLD AUTO: 40.8 % (ref 34–46.6)
HGB BLD-MCNC: 13.1 G/DL (ref 12–15.9)
HGB BLD-MCNC: 13.2 G/DL (ref 12–15.9)
HOLD SPECIMEN: NORMAL
INR PPP: 1.1 (ref 0.93–1.1)
LYMPHOCYTES # BLD AUTO: 1.5 10*3/MM3 (ref 0.7–3.1)
LYMPHOCYTES # BLD AUTO: 1.8 10*3/MM3 (ref 0.7–3.1)
LYMPHOCYTES NFR BLD AUTO: 18 % (ref 19.6–45.3)
LYMPHOCYTES NFR BLD AUTO: 25.7 % (ref 19.6–45.3)
MCH RBC QN AUTO: 30.7 PG (ref 26.6–33)
MCH RBC QN AUTO: 30.8 PG (ref 26.6–33)
MCHC RBC AUTO-ENTMCNC: 32.2 G/DL (ref 31.5–35.7)
MCHC RBC AUTO-ENTMCNC: 32.3 G/DL (ref 31.5–35.7)
MCV RBC AUTO: 95.3 FL (ref 79–97)
MCV RBC AUTO: 95.4 FL (ref 79–97)
MONOCYTES # BLD AUTO: 0.3 10*3/MM3 (ref 0.1–0.9)
MONOCYTES # BLD AUTO: 0.5 10*3/MM3 (ref 0.1–0.9)
MONOCYTES NFR BLD AUTO: 5 % (ref 5–12)
MONOCYTES NFR BLD AUTO: 5 % (ref 5–12)
NEUTROPHILS NFR BLD AUTO: 3.9 10*3/MM3 (ref 1.7–7)
NEUTROPHILS NFR BLD AUTO: 66.1 % (ref 42.7–76)
NEUTROPHILS NFR BLD AUTO: 7.4 10*3/MM3 (ref 1.7–7)
NEUTROPHILS NFR BLD AUTO: 75 % (ref 42.7–76)
NRBC BLD AUTO-RTO: 0 /100 WBC (ref 0–0.2)
NRBC BLD AUTO-RTO: 0 /100 WBC (ref 0–0.2)
PLATELET # BLD AUTO: 296 10*3/MM3 (ref 140–450)
PLATELET # BLD AUTO: 335 10*3/MM3 (ref 140–450)
PMV BLD AUTO: 7.7 FL (ref 6–12)
PMV BLD AUTO: 7.7 FL (ref 6–12)
PROTHROMBIN TIME: 11.7 SECONDS (ref 9.6–11.7)
RBC # BLD AUTO: 4.27 10*6/MM3 (ref 3.77–5.28)
RBC # BLD AUTO: 4.28 10*6/MM3 (ref 3.77–5.28)
WBC NRBC COR # BLD: 5.9 10*3/MM3 (ref 3.4–10.8)
WBC NRBC COR # BLD: 9.9 10*3/MM3 (ref 3.4–10.8)

## 2023-05-04 PROCEDURE — 99283 EMERGENCY DEPT VISIT LOW MDM: CPT

## 2023-05-04 PROCEDURE — 85730 THROMBOPLASTIN TIME PARTIAL: CPT

## 2023-05-04 PROCEDURE — 36415 COLL VENOUS BLD VENIPUNCTURE: CPT

## 2023-05-04 PROCEDURE — 82948 REAGENT STRIP/BLOOD GLUCOSE: CPT

## 2023-05-04 PROCEDURE — 85025 COMPLETE CBC W/AUTO DIFF WBC: CPT

## 2023-05-04 PROCEDURE — 85610 PROTHROMBIN TIME: CPT

## 2023-05-04 RX ORDER — LIDOCAINE HYDROCHLORIDE 20 MG/ML
10 INJECTION, SOLUTION EPIDURAL; INFILTRATION; INTRACAUDAL; PERINEURAL ONCE
Status: DISCONTINUED | OUTPATIENT
Start: 2023-05-04 | End: 2023-05-04 | Stop reason: HOSPADM

## 2023-05-04 RX ORDER — TRANEXAMIC ACID 100 MG/ML
500 INJECTION, SOLUTION INTRAVENOUS ONCE
Status: COMPLETED | OUTPATIENT
Start: 2023-05-04 | End: 2023-05-04

## 2023-05-04 RX ORDER — LIDOCAINE HYDROCHLORIDE 20 MG/ML
10 INJECTION, SOLUTION INFILTRATION; PERINEURAL ONCE
Status: DISCONTINUED | OUTPATIENT
Start: 2023-05-04 | End: 2023-05-04

## 2023-05-04 RX ORDER — LORAZEPAM 0.5 MG/1
0.5 TABLET ORAL ONCE
Status: COMPLETED | OUTPATIENT
Start: 2023-05-04 | End: 2023-05-04

## 2023-05-04 RX ORDER — ACETAMINOPHEN AND CODEINE PHOSPHATE 300; 30 MG/1; MG/1
1 TABLET ORAL EVERY 6 HOURS PRN
Qty: 12 TABLET | Refills: 0 | Status: SHIPPED | OUTPATIENT
Start: 2023-05-04

## 2023-05-04 RX ADMIN — SILVER NITRATE APPLICATORS 1 APPLICATION: 25; 75 STICK TOPICAL at 11:54

## 2023-05-04 RX ADMIN — LORAZEPAM 0.5 MG: 0.5 TABLET ORAL at 12:13

## 2023-05-04 RX ADMIN — PHENYLEPHRINE HYDROCHLORIDE 2 SPRAY: 0.5 SPRAY NASAL at 11:54

## 2023-05-04 RX ADMIN — TRANEXAMIC ACID 500 MG: 100 INJECTION, SOLUTION INTRAVENOUS at 13:32

## 2023-05-04 NOTE — ED PROVIDER NOTES
Subjective   History of Present Illness  Chief Complaint: Nosebleed      HPI: Patient is a 63-year-old female who presents to the emergency room by private vehicle complaining of nosebleed.  States that she has had intermittent episodes for the last 2 weeks, at the onset she was seen in the emergency room where she had packing, this was removed approximately 7 days later by ENT, states that she has had several nosebleeds that stopped on their own since the packing was removed.  States the nosebleeds initially started in 1 nare and now she feels it is coming from both.  She is on Xarelto.  Nosebleed today, states that it will not stop.  Visibly anxious on exam.    PCP: Jadenbendkezia    History provided by:  Patient and spouse      Review of Systems   HENT: Positive for nosebleeds.        Past Medical History:   Diagnosis Date   • Arthritis    • Baker's cyst of knee     RIGHT   • Depression    • Hiatal hernia    • History of breast augmentation 04/07/2020   • History of palpitations    • Hyperlipemia    • Hypertension    • Migraine    • PONV (postoperative nausea and vomiting)     SLOW TO WAKE   • Seasonal allergies        Allergies   Allergen Reactions   • Bee Venom Swelling   • Shellfish-Derived Products Swelling     Lips became swollen.       Past Surgical History:   Procedure Laterality Date   • BREAST AUGMENTATION Bilateral 06/2020   • COLONOSCOPY Bilateral 01/06/2023   • ENDOMETRIAL ABLATION      menustrual   • FOOT SURGERY      rt and lt foot   • LEEP     • MOLE REMOVAL     • NASAL SEPTUM SURGERY     • TOTAL KNEE ARTHROPLASTY Right 08/23/2016    Procedure: RT TOTAL KNEE ARTHROPLASTY WITH ROBERTO NAVIGATION, depuy;  Surgeon: Hakan Mccann MD;  Location: [facility];  Service:        Family History   Problem Relation Age of Onset   • Heart disease Mother    • Heart disease Father        Social History     Socioeconomic History   • Marital status:    Tobacco Use   • Smoking status: Never   • Smokeless  tobacco: Never   Vaping Use   • Vaping Use: Never used   Substance and Sexual Activity   • Alcohol use: No   • Drug use: No   • Sexual activity: Defer           Objective   Physical Exam  Vitals reviewed.   Constitutional:       Appearance: She is obese.   HENT:      Head: Normocephalic.      Nose:      Right Nostril: Epistaxis present.      Left Nostril: Epistaxis present.   Neurological:      Mental Status: She is alert.         Epistaxis Management    Date/Time: 5/4/2023 2:34 PM  Performed by: Hazel Solis APRN  Authorized by: Micah Solis MD     Consent:     Consent obtained:  Verbal    Consent given by:  Patient    Risks, benefits, and alternatives were discussed: yes      Risks discussed:  Bleeding and pain    Alternatives discussed:  No treatment  Universal protocol:     Patient identity confirmed:  Verbally with patient, hospital-assigned identification number and arm band  Anesthesia:     Anesthesia method:  Topical application    Topical anesthesia: Lidocaine 2%  Procedure details:     Treatment site:  R anterior    Treatment method:  Silver nitrate and anterior pack    Treatment episode: recurring    Post-procedure details:     Assessment:  Bleeding decreased    Procedure completion:  Tolerated               ED Course  ED Course as of 05/04/23 1657   Thu May 04, 2023   1157 Cheng-Synephrine placed in bilateral naris.  Patient actively hyperventilating stating she feels that she is going to pass out, states she also feels that her blood sugar might be low.  Nursing staff at bedside checking sugar.    I have attempted to calm the patient, speaking through breaking techniques.  [BH]   1245 Patient scheduled with ENT Monday May 8th  [BH]   1249 Bleeding continues, additional orders placed.  Dr. Poole at bedside for additional evaluation   [BH]   1300 Direct pressure was applied for Dr. Poole, bleeding now controlled, will monitor patient  [BH]   1336 Right nare began bleeding again, Dr. Poole  "went to bedside administered TXA and lidocaine, planning for packing. []   1357 Clamp removed []   1451 Unfortunately patient sneezed dislodging rhino rocket, suction completed of bilateral nares, cleared.  Rhino rocket replaced.   []   1645 Patient reports improvement, noted some drainage she states that it is clear pink-tinged.  Feels comfortable for discharge. []      ED Course User Index  [] SolisHazel JADYN, APRN           BP (!) 196/118   Pulse 73   Temp 98 °F (36.7 °C) (Oral)   Resp 20   Ht 160 cm (63\")   Wt 88.7 kg (195 lb 8.8 oz)   SpO2 98%   BMI 34.64 kg/m²   Labs Reviewed   APTT - Abnormal; Notable for the following components:       Result Value    PTT 35.1 (*)     All other components within normal limits   CBC WITH AUTO DIFFERENTIAL - Abnormal; Notable for the following components:    Lymphocyte % 18.0 (*)     Neutrophils, Absolute 7.40 (*)     All other components within normal limits   POCT GLUCOSE FINGERSTICK - Abnormal; Notable for the following components:    Glucose 112 (*)     All other components within normal limits   PROTIME-INR - Normal   CBC WITH AUTO DIFFERENTIAL - Normal   CBC AND DIFFERENTIAL    Narrative:     The following orders were created for panel order CBC & Differential.  Procedure                               Abnormality         Status                     ---------                               -----------         ------                     CBC Auto Differential[[id number]]        Normal              Final result                 Please view results for these tests on the individual orders.   EXTRA TUBES    Narrative:     The following orders were created for panel order Extra Tubes.  Procedure                               Abnormality         Status                     ---------                               -----------         ------                     Green Top (Gel)[[id number]]                                  Final result                 Please view " results for these tests on the individual orders.   GREEN TOP   CBC AND DIFFERENTIAL    Narrative:     The following orders were created for panel order CBC & Differential.  Procedure                               Abnormality         Status                     ---------                               -----------         ------                     CBC Auto Differential[[id number]]        Abnormal            Final result                 Please view results for these tests on the individual orders.     Medications   phenylephrine (GIOVANNI-SYNEPHRINE) 0.5 % nasal spray 2 spray (2 sprays Nasal Given 5/4/23 1154)   silver nitrate 75-25 % applicator 1 application (1 application Topical Not Given 5/4/23 1332)   lidocaine PF 2% (XYLOCAINE) injection 10 mL (has no administration in time range)   silver nitrate 75-25 % applicator 1 application (1 application Topical Given 5/4/23 1154)   LORazepam (ATIVAN) tablet 0.5 mg (0.5 mg Oral Given 5/4/23 1213)   tranexamic acid 500 MG/5ML nasal (ED/Crit Care for epistaxis) - VIAL DISPENSE 500 mg (500 mg Nasal Given 5/4/23 1332)     No radiology results for the last day                                  Medical Decision Making  This patient presented to the emergency room with epistaxis, she is currently on Xarelto, labs were obtained CBC noted normal anemia, platelets within normal limits.  Initially attempted treatment with Giovanni-Synephrine and silver nitrate, Dr. Poole was brought to bedside as patient continued to have bleeding, direct pressure was held to the right nare for approximately 5 minutes, 6S for short time, bleeding then returned, TXA and additional Giovanni-Synephrine applied to the right nare.  Lidocaine 2% to the right nare as well in anticipation of Rhino Rocket placement.  After clamp was removed bleeding restarted, Rhino Rocket placed.  Patient required multiple interventions over several hours throughout her emergency room stay.  She then began bleeding in her left nare and   Zulema placed an additional Rhino Rocket.  Posterior oropharynx evaluated without bleeding.  CBC initially noted a hemoglobin of 13.1, at reevaluation it was repeated at 13.2, no acute anemia noted.  Patient reports some nasal discharge it is mucus/pink-tinged.  She is scheduled to see ENT in 4 days and feels comfortable being discharged home.  Patient was hypertensive throughout her stay as well, took her at home doses of hypertension medication.  Patient gave verbal understanding of ER course of care as well as importance of following up with outpatient specialist.  Patient was alert oriented nontoxic in appearance at time of discharge, denied further questions or complaints.    As nurse was just guessing discharge instructions to the patient, requested pain medication inspect completed patient has had 1 prescriber for 1 prescription most recent fill 4/12/2023 for oxycodone 5-3 25 quantity 12 for 3-day supply.  Prescription for Tylenol 3 was sent to her preferred pharmacy.    Chart review: Per chart review patient was seen on 4/12/2023 diagnosed with anterior epistaxis, attempted intervention using Cheng-Synephrine and cocaine, followed by silver nitrate, continued bleeding was packed with a Rhino Rocket.        This document is intended for medical expert use only. Reading of this document by patients and/or patient's family without participating medical staff guidance may result in misinterpretation and unintended morbidity. Any interpretation of such data is the responsibility of the patient and/or family member responsible for the patient in concert with their primary or specialist providers, not to be left for sources of online searches such as [employer], [employer] or similar queries. Relying on these approaches to knowledge may result in misinterpretation, misguided goals of care and even death should patients or family members try recommendations outside of the realm of professional medical care in a supervised  inpatient environment.     Appropriate PPE worn during exam.    Epistaxis: complicated acute illness or injury  Amount and/or Complexity of Data Reviewed  External Data Reviewed: labs and notes.  Labs: ordered. Decision-making details documented in ED Course.      Risk  OTC drugs.  Prescription drug management.  Minor surgery with identified risk factors.          Final diagnoses:   Epistaxis       ED Disposition  ED Disposition     ED Disposition   Discharge    Condition   Stable    Comment   --             Patricia Steele MD  [address] [facility]  [address]  [place] [address] [phone]          Karthikeyan Jimenez MD  [address]  [place] [id number] [phone]               Medication List      New Prescriptions    acetaminophen-codeine 300-30 MG per tablet  Commonly known as: TYLENOL #3  Take 1 tablet by mouth Every 6 (Six) Hours As Needed for Moderate Pain.           Where to Get Your Medications      These medications were sent to [id number] - [place], [place] - [address] - [phone]  - [phone] FX  [address] [facility] [place] [address]    Phone: [phone]   · acetaminophen-codeine 300-30 MG per tablet         Hazel Solis, APRN  05/04/23 9883

## 2023-05-04 NOTE — DISCHARGE INSTRUCTIONS
Follow-up with ENT as scheduled  Try to avoid messing with the Rhino Rocket's  Do not use straws to drink    Rest    Follow-up with primary care    Return to the ER for new or worsening symptoms

## 2023-05-10 RX ORDER — ACYCLOVIR 800 MG/1
TABLET ORAL
Qty: 90 TABLET | Refills: 0 | Status: SHIPPED | OUTPATIENT
Start: 2023-05-10

## 2023-05-11 ENCOUNTER — TELEPHONE (OUTPATIENT)
Dept: CARDIOLOGY | Facility: CLINIC | Age: 63
End: 2023-05-11
Payer: COMMERCIAL

## 2023-05-11 NOTE — TELEPHONE ENCOUNTER
DR. MCDANIEL  PROPJOSEPH IMPLANT PLACEMENT  SURGERY 5/18/23  PHONE [phone]  [phone]    PLACED ON DR. DURAN PEPE DESK.

## 2023-06-28 NOTE — PATIENT INSTRUCTIONS
Keep working to lose weight through healthy eating and exercise.  Talk to pharmacist about the shingles vaccine (SHINGRIX)  Get a flu shot this Fall  Try using a TENS unit on your back     Medical Necessity Information: It is in your best interest to select a reason for this procedure from the list below. All of these items fulfill various CMS LCD requirements except lesion extends to a margin.

## 2023-07-24 RX ORDER — METOPROLOL SUCCINATE 50 MG/1
TABLET, EXTENDED RELEASE ORAL
Qty: 90 TABLET | Refills: 0 | Status: SHIPPED | OUTPATIENT
Start: 2023-07-24

## 2023-07-25 ENCOUNTER — OFFICE VISIT (OUTPATIENT)
Dept: CARDIOLOGY | Facility: CLINIC | Age: 63
End: 2023-07-25
Payer: COMMERCIAL

## 2023-07-25 VITALS
WEIGHT: 202 LBS | OXYGEN SATURATION: 98 % | BODY MASS INDEX: 35.79 KG/M2 | DIASTOLIC BLOOD PRESSURE: 86 MMHG | HEART RATE: 61 BPM | SYSTOLIC BLOOD PRESSURE: 140 MMHG | HEIGHT: 63 IN

## 2023-07-25 DIAGNOSIS — I48.0 PAROXYSMAL ATRIAL FIBRILLATION: Primary | ICD-10-CM

## 2023-07-25 DIAGNOSIS — E78.2 MIXED HYPERLIPIDEMIA: ICD-10-CM

## 2023-07-25 DIAGNOSIS — I10 PRIMARY HYPERTENSION: ICD-10-CM

## 2023-07-25 PROCEDURE — 99214 OFFICE O/P EST MOD 30 MIN: CPT | Performed by: INTERNAL MEDICINE

## 2023-07-25 NOTE — PROGRESS NOTES
"    Subjective:     Encounter Date:07/25/2023      Patient ID: Sarah Brannon is a 63 y.o. female.    Chief Complaint:  Atrial Fibrillation  Symptoms include shortness of breath. Symptoms are negative for chest pain, dizziness and palpitations. Past medical history includes atrial fibrillation.     62-year-old white female with history of paroxysmal fibrillation hypertension hyperlipidemia.  Office for a follow-up.  Patient is currently stable without any symptoms of chest pain but has some shortness of with exertion.  No complaint of any PND orthopnea.  No palpitation dizziness syncope or swelling of the feet.  Patient is taking all her medicines regularly.  Patient does not smoke.    The following portions of the patient's history were reviewed and updated as appropriate: allergies, current medications, past family history, past medical history, past social history, past surgical history, and problem list.  Past Medical History:   Diagnosis Date    Arthritis     Baker's cyst of knee     RIGHT    Depression     Hiatal hernia     History of breast augmentation 04/07/2020    History of palpitations     Hyperlipemia     Hypertension     Migraine     PONV (postoperative nausea and vomiting)     SLOW TO WAKE    Seasonal allergies      Past Surgical History:   Procedure Laterality Date    BREAST AUGMENTATION Bilateral 06/2020    COLONOSCOPY Bilateral 01/06/2023    ENDOMETRIAL ABLATION      menustrual    FOOT SURGERY      rt and lt foot    LEEP      MOLE REMOVAL      NASAL SEPTUM SURGERY      TOTAL KNEE ARTHROPLASTY Right 08/23/2016    Procedure: RT TOTAL KNEE ARTHROPLASTY WITH ROBERTO NAVIGATION, depuy;  Surgeon: Hakan Mccann MD;  Location: [facility];  Service:      /86   Pulse 61   Ht 160 cm (62.99\")   Wt 91.6 kg (202 lb)   SpO2 98%   BMI 35.79 kg/m²   Family History   Problem Relation Age of Onset    Heart disease Mother     Heart disease Father        Current Outpatient Medications:     " acetaminophen-codeine (TYLENOL #3) 300-30 MG per tablet, Take 1 tablet by mouth Every 6 (Six) Hours As Needed for Moderate Pain., Disp: 12 tablet, Rfl: 0    acyclovir (ZOVIRAX) 800 MG tablet, TAKE 1/2 TABLET BY MOUTH ONE TIME A DAY, Disp: 90 tablet, Rfl: 0    ASPIRIN 81 PO, Take  by mouth., Disp: , Rfl:     atorvastatin (LIPITOR) 40 MG tablet, TAKE 1 TABLET BY MOUTH EVERY NIGHT, Disp: 90 tablet, Rfl: 0    buPROPion XL (WELLBUTRIN XL) 300 MG 24 hr tablet, Take 1 tablet by mouth Every Morning., Disp: , Rfl:     cefuroxime (CEFTIN) 250 MG tablet, Take 1 tablet by mouth 2 (Two) Times a Day., Disp: 10 tablet, Rfl: 0    cetirizine (zyrTEC) 10 MG tablet, TAKE 1 TABLET BY MOUTH EVERY DAY, Disp: 90 tablet, Rfl: 3    clobetasol (TEMOVATE) 0.05 % cream, Apply  topically to the appropriate area as directed 2 (Two) Times a Day., Disp: 30 g, Rfl: 1    levoFLOXacin (Levaquin) 500 MG tablet, Take 1 tablet by mouth Daily., Disp: 10 tablet, Rfl: 0    lisinopril (PRINIVIL,ZESTRIL) 10 MG tablet, Take 1 tablet by mouth Daily. For blood pressure, Disp: 90 tablet, Rfl: 3    metoprolol succinate XL (TOPROL-XL) 50 MG 24 hr tablet, TAKE 1 TABLET BY MOUTH EVERY DAY, Disp: 90 tablet, Rfl: 0    oxyCODONE-acetaminophen (PERCOCET) 5-325 MG per tablet, One half or 1 p.o. every 6 hours as needed pain, Disp: 12 tablet, Rfl: 0    potassium chloride 10 MEQ CR tablet, TAKE 1 TABLET BY MOUTH EVERY DAY, Disp: 90 tablet, Rfl: 3    tiZANidine (ZANAFLEX) 4 MG tablet, Take 1 tablet by mouth At Night As Needed for Muscle Spasms., Disp: 15 tablet, Rfl: 0    Xarelto 20 MG tablet, TAKE 1 TABLET BY MOUTH EVERY DAY, Disp: 90 tablet, Rfl: 3    venlafaxine XR (EFFEXOR-XR) 75 MG 24 hr capsule, Take 3 capsules by mouth Daily for 30 days., Disp: 90 capsule, Rfl: 0  Allergies   Allergen Reactions    Bee Venom Swelling    Shellfish-Derived Products Swelling     Lips became swollen.     Social History     Socioeconomic History    Marital status:    Tobacco Use     Smoking status: Never    Smokeless tobacco: Never   Vaping Use    Vaping Use: Never used   Substance and Sexual Activity    Alcohol use: No    Drug use: No    Sexual activity: Defer     Review of Systems   Constitutional: Negative for malaise/fatigue.   Cardiovascular:  Negative for chest pain, dyspnea on exertion, leg swelling and palpitations.   Respiratory:  Positive for shortness of breath. Negative for cough.    Gastrointestinal:  Positive for nausea. Negative for abdominal pain and vomiting.   Neurological:  Positive for numbness. Negative for dizziness, focal weakness, headaches and light-headedness.   All other systems reviewed and are negative.           Objective:     Constitutional:       Appearance: Well-developed.   Eyes:      General: No scleral icterus.     Conjunctiva/sclera: Conjunctivae normal.   HENT:      Head: Normocephalic and atraumatic.   Neck:      Vascular: No carotid bruit or JVD.   Pulmonary:      Effort: Pulmonary effort is normal.      Breath sounds: Normal breath sounds. No wheezing. No rales.   Cardiovascular:      Normal rate. Regular rhythm.   Pulses:     Intact distal pulses.   Abdominal:      General: Bowel sounds are normal.      Palpations: Abdomen is soft.   Musculoskeletal:      Cervical back: Normal range of motion and neck supple. Skin:     General: Skin is warm and dry.      Findings: No rash.   Neurological:      Mental Status: Alert.     Procedures    Lab Review:         MDM    #1 atrial fibrillation  Patient has history of paroxysmal fibrillation but is currently stable on medications including metoprolol and is in sinus rhythm but also takes Xarelto for anticoagulation  2.  Hypertension  Patient blood pressure currently stable on metoprolol and lisinopril  3.  Hyperlipidemia  Patient is on atorvastatin the lipid levels are well within normal limits  4.  TIA/stroke  Patient had history of TIA and hence she is on aspirin also and followed by primary care  doctor.    Patient's previous medical records, labs, and EKG were reviewed and discussed with the patient at today's visit.

## 2023-08-22 RX ORDER — LISINOPRIL 10 MG/1
10 TABLET ORAL DAILY
Qty: 90 TABLET | Refills: 0 | Status: SHIPPED | OUTPATIENT
Start: 2023-08-22

## 2023-09-01 ENCOUNTER — TELEPHONE (OUTPATIENT)
Dept: FAMILY MEDICINE CLINIC | Facility: CLINIC | Age: 63
End: 2023-09-01
Payer: COMMERCIAL

## 2023-09-01 NOTE — TELEPHONE ENCOUNTER
"    Caller: Sarah Brannon \"ERENDIRA\"    Relationship to patient: Self    Best call back number: [phone]    Patient is needing: THE FOOT DOCTOR NAME IS RAZ PRICE- PODIATRIST AND IS NEEDING A CLEARANCE FORM THE PROVIDER FOR FOOT SURGERY     THE PROBLEM ON THE LEFT FOOT IS BUNIONS AND A HAMMER TOE  SHE KEEP GETTING SOAR THAT MAKE IT HARD TO WALK AND SHE NEEDS TO GET IT FIXED     RAZ PRICE  [address] [facility]- [phone]  SYV-[phone]  "

## 2023-09-01 NOTE — TELEPHONE ENCOUNTER
She just needs clearance from cardiology  I have not seen her since Sept of last year and will not see her until later this mnth

## 2023-09-19 ENCOUNTER — LAB (OUTPATIENT)
Dept: FAMILY MEDICINE CLINIC | Facility: CLINIC | Age: 63
End: 2023-09-19
Payer: COMMERCIAL

## 2023-09-19 ENCOUNTER — TELEPHONE (OUTPATIENT)
Dept: FAMILY MEDICINE CLINIC | Facility: CLINIC | Age: 63
End: 2023-09-19

## 2023-09-19 ENCOUNTER — TELEPHONE (OUTPATIENT)
Dept: CARDIOLOGY | Facility: CLINIC | Age: 63
End: 2023-09-19
Payer: COMMERCIAL

## 2023-09-19 ENCOUNTER — OFFICE VISIT (OUTPATIENT)
Dept: FAMILY MEDICINE CLINIC | Facility: CLINIC | Age: 63
End: 2023-09-19
Payer: COMMERCIAL

## 2023-09-19 VITALS
BODY MASS INDEX: 35.97 KG/M2 | HEIGHT: 63 IN | HEART RATE: 60 BPM | DIASTOLIC BLOOD PRESSURE: 95 MMHG | TEMPERATURE: 97.3 F | OXYGEN SATURATION: 99 % | WEIGHT: 203 LBS | SYSTOLIC BLOOD PRESSURE: 151 MMHG

## 2023-09-19 DIAGNOSIS — Z23 NEED FOR PNEUMOCOCCAL 20-VALENT CONJUGATE VACCINATION: ICD-10-CM

## 2023-09-19 DIAGNOSIS — E66.9 OBESITY (BMI 30-39.9): ICD-10-CM

## 2023-09-19 DIAGNOSIS — Z78.0 POSTMENOPAUSAL STATUS: ICD-10-CM

## 2023-09-19 DIAGNOSIS — Z00.01 ENCOUNTER FOR GENERAL ADULT MEDICAL EXAMINATION WITH ABNORMAL FINDINGS: ICD-10-CM

## 2023-09-19 DIAGNOSIS — Z12.31 BREAST CANCER SCREENING BY MAMMOGRAM: ICD-10-CM

## 2023-09-19 DIAGNOSIS — Z00.01 ENCOUNTER FOR GENERAL ADULT MEDICAL EXAMINATION WITH ABNORMAL FINDINGS: Primary | ICD-10-CM

## 2023-09-19 PROBLEM — M20.21 ACQUIRED HALLUX RIGIDUS OF RIGHT FOOT: Status: ACTIVE | Noted: 2023-08-22

## 2023-09-19 PROBLEM — M79.672 BILATERAL FOOT PAIN: Status: ACTIVE | Noted: 2023-08-22

## 2023-09-19 PROBLEM — F32.9 MAJOR DEPRESSIVE DISORDER, SINGLE EPISODE, UNSPECIFIED: Status: ACTIVE | Noted: 2023-09-19

## 2023-09-19 PROBLEM — K57.30 DIVERTICULOSIS OF LARGE INTESTINE WITHOUT PERFORATION OR ABSCESS WITHOUT BLEEDING: Status: ACTIVE | Noted: 2023-01-06

## 2023-09-19 PROBLEM — L40.9 PSORIASIS: Status: ACTIVE | Noted: 2023-09-19

## 2023-09-19 PROBLEM — M20.32 ACQUIRED HALLUX VARUS OF LEFT FOOT: Status: ACTIVE | Noted: 2023-08-22

## 2023-09-19 PROBLEM — Z96.9 PRESENCE OF RETAINED HARDWARE: Status: ACTIVE | Noted: 2023-08-22

## 2023-09-19 PROBLEM — M19.072 ARTHRITIS OF LEFT FOOT: Status: ACTIVE | Noted: 2023-08-22

## 2023-09-19 PROBLEM — R10.30 LOWER ABDOMINAL PAIN, UNSPECIFIED: Status: ACTIVE | Noted: 2023-09-19

## 2023-09-19 PROBLEM — K44.9 HIATAL HERNIA: Status: ACTIVE | Noted: 2023-09-19

## 2023-09-19 PROBLEM — K59.00 CONSTIPATION: Status: ACTIVE | Noted: 2023-09-19

## 2023-09-19 PROBLEM — R00.8 OTHER ABNORMALITIES OF HEART BEAT: Status: ACTIVE | Noted: 2023-09-19

## 2023-09-19 PROBLEM — A64 UNSPECIFIED SEXUALLY TRANSMITTED DISEASE: Status: ACTIVE | Noted: 2023-09-19

## 2023-09-19 PROBLEM — E78.00 PURE HYPERCHOLESTEROLEMIA: Status: ACTIVE | Noted: 2023-09-19

## 2023-09-19 PROBLEM — K63.5 POLYP OF COLON: Status: ACTIVE | Noted: 2023-01-06

## 2023-09-19 PROBLEM — J30.2 SEASONAL ALLERGIES: Status: ACTIVE | Noted: 2023-09-19

## 2023-09-19 PROBLEM — K58.2 IRRITABLE BOWEL SYNDROME WITH MIXED BOWEL HABITS: Status: ACTIVE | Noted: 2023-09-19

## 2023-09-19 PROBLEM — I63.9 CEREBRAL INFARCTION, UNSPECIFIED: Status: ACTIVE | Noted: 2023-09-19

## 2023-09-19 PROBLEM — M20.42 HAMMER TOE OF LEFT FOOT: Status: ACTIVE | Noted: 2023-08-22

## 2023-09-19 PROBLEM — M79.671 BILATERAL FOOT PAIN: Status: ACTIVE | Noted: 2023-08-22

## 2023-09-19 PROBLEM — M54.9 BACK PAIN: Status: ACTIVE | Noted: 2023-09-19

## 2023-09-19 LAB
ALBUMIN SERPL-MCNC: 4.6 G/DL (ref 3.5–5.2)
ALBUMIN/GLOB SERPL: 1.6 G/DL
ALP SERPL-CCNC: 104 U/L (ref 39–117)
ALT SERPL W P-5'-P-CCNC: 16 U/L (ref 1–33)
ANION GAP SERPL CALCULATED.3IONS-SCNC: 11 MMOL/L (ref 5–15)
AST SERPL-CCNC: 19 U/L (ref 1–32)
BILIRUB SERPL-MCNC: 0.5 MG/DL (ref 0–1.2)
BUN SERPL-MCNC: 19 MG/DL (ref 8–23)
BUN/CREAT SERPL: 17.3 (ref 7–25)
CALCIUM SPEC-SCNC: 9.5 MG/DL (ref 8.6–10.5)
CHLORIDE SERPL-SCNC: 102 MMOL/L (ref 98–107)
CHOLEST SERPL-MCNC: 159 MG/DL (ref 0–200)
CO2 SERPL-SCNC: 29 MMOL/L (ref 22–29)
CREAT SERPL-MCNC: 1.1 MG/DL (ref 0.57–1)
EGFRCR SERPLBLD CKD-EPI 2021: 55.9 ML/MIN/1.73
GLOBULIN UR ELPH-MCNC: 2.8 GM/DL
GLUCOSE SERPL-MCNC: 73 MG/DL (ref 65–99)
HBA1C MFR BLD: 6.3 % (ref 4.8–5.6)
HDLC SERPL-MCNC: 61 MG/DL (ref 40–60)
LDLC SERPL CALC-MCNC: 80 MG/DL (ref 0–100)
LDLC/HDLC SERPL: 1.28 {RATIO}
POTASSIUM SERPL-SCNC: 4 MMOL/L (ref 3.5–5.2)
PROT SERPL-MCNC: 7.4 G/DL (ref 6–8.5)
SODIUM SERPL-SCNC: 142 MMOL/L (ref 136–145)
TRIGL SERPL-MCNC: 100 MG/DL (ref 0–150)
VLDLC SERPL-MCNC: 18 MG/DL (ref 5–40)

## 2023-09-19 PROCEDURE — 80053 COMPREHEN METABOLIC PANEL: CPT | Performed by: FAMILY MEDICINE

## 2023-09-19 PROCEDURE — 83036 HEMOGLOBIN GLYCOSYLATED A1C: CPT | Performed by: FAMILY MEDICINE

## 2023-09-19 PROCEDURE — 36415 COLL VENOUS BLD VENIPUNCTURE: CPT

## 2023-09-19 PROCEDURE — 80061 LIPID PANEL: CPT | Performed by: FAMILY MEDICINE

## 2023-09-19 RX ORDER — LISINOPRIL 10 MG/1
10 TABLET ORAL DAILY
Qty: 90 TABLET | Refills: 3 | Status: SHIPPED | OUTPATIENT
Start: 2023-09-19

## 2023-09-19 RX ORDER — CETIRIZINE HYDROCHLORIDE 10 MG/1
10 TABLET ORAL DAILY
Qty: 90 TABLET | Refills: 3 | Status: SHIPPED | OUTPATIENT
Start: 2023-09-19

## 2023-09-19 RX ORDER — ACYCLOVIR 800 MG/1
800 TABLET ORAL DAILY
Qty: 90 TABLET | Refills: 3 | Status: SHIPPED | OUTPATIENT
Start: 2023-09-19

## 2023-09-19 RX ORDER — NYSTATIN 100000 U/G
1 CREAM TOPICAL 2 TIMES DAILY
Qty: 30 G | Refills: 0 | Status: SHIPPED | OUTPATIENT
Start: 2023-09-19

## 2023-09-19 RX ORDER — ATORVASTATIN CALCIUM 40 MG/1
40 TABLET, FILM COATED ORAL NIGHTLY
Qty: 90 TABLET | Refills: 3 | Status: SHIPPED | OUTPATIENT
Start: 2023-09-19

## 2023-09-19 RX ORDER — POTASSIUM CHLORIDE 750 MG/1
10 TABLET, FILM COATED, EXTENDED RELEASE ORAL DAILY
Qty: 90 TABLET | Refills: 3 | Status: SHIPPED | OUTPATIENT
Start: 2023-09-19

## 2023-09-19 RX ORDER — METOPROLOL SUCCINATE 50 MG/1
50 TABLET, EXTENDED RELEASE ORAL DAILY
Qty: 90 TABLET | Refills: 3 | Status: SHIPPED | OUTPATIENT
Start: 2023-09-19

## 2023-09-19 NOTE — PROGRESS NOTES
"Subjective   Sarahanna Brannon is a 63 y.o. female.     History of Present Illness  Here for cpe       Ms. Brannon is a 63-year-old female who presents today for her physical.    The patient's blood pressure is slightly elevated today. She was a little anxious coming into the office today, and she believes it is due to depression. She wants to stay home in bed but that is not an option. She \"deals\" with it. She retired 2.5 to 3 years ago and would like to work from home. She has been wanting to start writing, but she has had so many other things going on. She has not started her notes. All of her books are not in one place, but she is cleaning out the spare bedroom for an office. She has a plan, but she is getting there slowly.    The patient's weight has been creeping up. She has been staying home and not going out because both of her feet have problems. She is seeing a podiatrist, who is going to do surgery on her foot next Monday, 09/25/2023. She has a big ball of arthritis on the bottom of her foot, and it feels like a stone every time. She has not been walking a whole lot. She has been sitting around the house. When she does work around the house, her back starts killing her. She can work for about 15 to 20 minutes, but then she has to sit down for 15 to 20 minutes for the pain to go away. She is not where she would like to be. She has slowed way down. She has joined a gym.    The patient denies any headaches that are out of character for her. She denies any shortness of breath. She denies any abdominal pain. Her bowels are \"pretty slow.\" She is taking Dulcolax. She is urinating okay. She drinks a lot of water.    The following portions of the patient's history were reviewed and updated as appropriate: allergies, current medications, past family history, past medical history, past social history, past surgical history, and problem list.  Past Medical History:   Diagnosis Date    Arthritis     Baker's cyst of " knee     RIGHT    Depression     Hiatal hernia     History of breast augmentation 04/07/2020    History of palpitations     Hyperlipemia     Hypertension     Migraine     PONV (postoperative nausea and vomiting)     SLOW TO WAKE    Seasonal allergies      Past Surgical History:   Procedure Laterality Date    BREAST AUGMENTATION Bilateral 06/2020    COLONOSCOPY Bilateral 01/06/2023    ENDOMETRIAL ABLATION      menustrual    FOOT SURGERY      rt and lt foot    LEEP      MOLE REMOVAL      NASAL SEPTUM SURGERY      TOTAL KNEE ARTHROPLASTY Right 08/23/2016    Procedure: RT TOTAL KNEE ARTHROPLASTY WITH ROBERTO NAVIGATION, depuy;  Surgeon: Hakan Mccann MD;  Location: [facility] MAIN OR;  Service:      Family History   Problem Relation Age of Onset    Heart disease Mother     Heart disease Father      Social History     Socioeconomic History    Marital status:    Tobacco Use    Smoking status: Never     Passive exposure: Never    Smokeless tobacco: Never   Vaping Use    Vaping Use: Never used   Substance and Sexual Activity    Alcohol use: No    Drug use: No    Sexual activity: Defer         Current Outpatient Medications:     ASPIRIN 81 PO, Take  by mouth., Disp: , Rfl:     buPROPion XL (WELLBUTRIN XL) 300 MG 24 hr tablet, Take 1 tablet by mouth Every Morning., Disp: , Rfl:     clobetasol (TEMOVATE) 0.05 % cream, Apply  topically to the appropriate area as directed 2 (Two) Times a Day., Disp: 30 g, Rfl: 1    venlafaxine XR (EFFEXOR-XR) 75 MG 24 hr capsule, Take 3 capsules by mouth Daily for 30 days., Disp: 90 capsule, Rfl: 0    Xarelto 20 MG tablet, TAKE 1 TABLET BY MOUTH EVERY DAY, Disp: 90 tablet, Rfl: 3    acyclovir (ZOVIRAX) 800 MG tablet, Take 1 tablet by mouth Daily., Disp: 90 tablet, Rfl: 3    atorvastatin (LIPITOR) 40 MG tablet, Take 1 tablet by mouth Every Night., Disp: 90 tablet, Rfl: 3    cetirizine (zyrTEC) 10 MG tablet, Take 1 tablet by mouth Daily., Disp: 90 tablet, Rfl: 3    lisinopril  "(PRINIVIL,ZESTRIL) 10 MG tablet, Take 1 tablet by mouth Daily. For blood pressure, Disp: 90 tablet, Rfl: 3    metoprolol succinate XL (TOPROL-XL) 50 MG 24 hr tablet, Take 1 tablet by mouth Daily., Disp: 90 tablet, Rfl: 3    nystatin (MYCOSTATIN) 796654 UNIT/GM cream, Apply 1 application  topically to the appropriate area as directed 2 (Two) Times a Day., Disp: 30 g, Rfl: 0    potassium chloride 10 MEQ CR tablet, Take 1 tablet by mouth Daily., Disp: 90 tablet, Rfl: 3    Review of Systems  /95 (BP Location: Left arm, Patient Position: Sitting, Cuff Size: Large Adult)   Pulse 60   Temp 97.3 °F (36.3 °C) (Temporal)   Ht 160 cm (63\")   Wt 92.1 kg (203 lb)   SpO2 99%   BMI 35.96 kg/m²   A review of systems was performed, and the pertinent positives are noted in the HPI.      Objective   Physical Exam  Vitals and nursing note reviewed. Exam conducted with a chaperone present.   Constitutional:       Appearance: Normal appearance. She is well-developed and well-groomed. She is obese.   HENT:      Head: Normocephalic and atraumatic.      Right Ear: Tympanic membrane, ear canal and external ear normal.      Left Ear: Tympanic membrane, ear canal and external ear normal.      Nose: Nose normal.      Mouth/Throat:      Mouth: Mucous membranes are moist.      Pharynx: Oropharynx is clear.   Eyes:      Extraocular Movements: Extraocular movements intact.      Conjunctiva/sclera: Conjunctivae normal.      Pupils: Pupils are equal, round, and reactive to light.   Neck:      Thyroid: No thyromegaly.      Vascular: No carotid bruit.   Cardiovascular:      Rate and Rhythm: Normal rate and regular rhythm.      Pulses: Normal pulses.      Heart sounds: Normal heart sounds.   Pulmonary:      Effort: Pulmonary effort is normal.      Breath sounds: Normal breath sounds.   Chest:   Breasts:     Right: Normal.      Left: Normal.   Abdominal:      General: Abdomen is flat. Bowel sounds are normal.      Palpations: Abdomen is soft. " There is no hepatomegaly, splenomegaly or mass.      Tenderness: There is no abdominal tenderness.      Hernia: No hernia is present.   Musculoskeletal:      Cervical back: Normal range of motion and neck supple.      Right lower leg: No edema.      Left lower leg: No edema.   Lymphadenopathy:      Cervical: No cervical adenopathy.      Upper Body:      Right upper body: No supraclavicular or axillary adenopathy.      Left upper body: No supraclavicular or axillary adenopathy.   Skin:     General: Skin is warm and dry.      Findings: No lesion or rash.   Neurological:      General: No focal deficit present.      Mental Status: She is alert.      Motor: Motor function is intact.      Deep Tendon Reflexes: Reflexes are normal and symmetric.   Psychiatric:         Attention and Perception: Attention normal.         Mood and Affect: Mood normal.         Behavior: Behavior is cooperative.         Assessment & Plan   Problems Addressed this Visit          Endocrine and Metabolic    Obesity (BMI 30-39.9)       Health Encounters    Encounter for general adult medical examination with abnormal findings - Primary    Relevant Orders    Comprehensive Metabolic Panel    Lipid Panel    Hemoglobin A1c     Other Visit Diagnoses       Breast cancer screening by mammogram        Relevant Orders    Mammo Screening Digital Tomosynthesis Bilateral With CAD    Postmenopausal status        Relevant Orders    DEXA Bone Density Axial          Diagnoses         Codes Comments    Encounter for general adult medical examination with abnormal findings    -  Primary ICD-10-CM: Z00.01  ICD-9-CM: V70.0     Obesity (BMI 30-39.9)     ICD-10-CM: E66.9  ICD-9-CM: 278.00     Breast cancer screening by mammogram     ICD-10-CM: Z12.31  ICD-9-CM: V76.12     Postmenopausal status     ICD-10-CM: Z78.0  ICD-9-CM: V49.81               1. Encounter for general adult medical exam with abnormal findings including obesity with a BMI between 30 and 39.9  - She was  counseled on the need for weight loss.  - She is getting ready to have some surgeries done which is hampering her physical activity.  - She was encouraged to work on stress reduction.  - She was encouraged to get a influenza vaccine this fall.  - She was given a Prevnar 20 while she was here today.  - I have ordered a CMP, lipid, and A1c.  - I have refilled all of her medications.    2. Breast cancer screening by mammogram  - The mammogram has been ordered.    3. Postmenopausal status  - Her DEXA scan has been ordered.    I will see her back in a year.         Transcribed from ambient dictation for Patricia Steele MD by Tamara Leger.  09/19/23   13:24 EDT    Patient or patient representative verbalized consent to the visit recording.  I have personally performed the services described in this document as transcribed by the above individual, and it is both accurate and complete.

## 2023-09-19 NOTE — TELEPHONE ENCOUNTER
Received called from ortho requesting surgery clearance for this Friday 9/19/23.   I left MY Back a detailed vm that per Dr. Steele, she can not clear, pt needs cardiac clearance from cardiologist.   Hub can read message.

## 2023-09-19 NOTE — TELEPHONE ENCOUNTER
FACILITY: [facility]  DR: Rigoberto Dee  PHONE:   FAX: Nazanin Dawn @ [phone]  PROCEDURE: Left Mis Arthrodesis of 1st IPJ, MTP, 2nd MTP Joint Capsulotomy, 4th & 5th Derotational Arthroplasty, Hardware Removal   SCHEDULED: 9/25/23  MEDS TO HOLD: ASA      Paperwork has been given to Dr Garcia's MA's

## 2023-09-20 NOTE — TELEPHONE ENCOUNTER
[facility] orthopedics called back to see if clearance has been faxed back over yet.

## 2023-09-21 NOTE — TELEPHONE ENCOUNTER
Patient is cleared, faxed clearance.   Ting Braga needs refill of   Requested Prescriptions     Pending Prescriptions Disp Refills    amLODIPine (NORVASC) 2.5 MG tablet 90 tablet 0     Sig: Take 1 tablet by mouth daily       Last Filled on:  12/6/21    Last Visit Date:  9/9/2021    Next Visit Date:    3/25/2022      Patient is going to establish w Dr Shakila Spencer in March since she can only have appointments on Friday and is unable to come to [facility]     30*1 pending

## 2023-09-29 ENCOUNTER — HOSPITAL ENCOUNTER (OUTPATIENT)
Dept: BONE DENSITY | Facility: HOSPITAL | Age: 63
Discharge: HOME | End: 2023-09-29
Payer: COMMERCIAL

## 2023-09-29 ENCOUNTER — HOSPITAL ENCOUNTER (OUTPATIENT)
Dept: MAMMOGRAPHY | Facility: HOSPITAL | Age: 63
Discharge: HOME | End: 2023-09-29
Payer: COMMERCIAL

## 2023-09-29 DIAGNOSIS — Z12.31 BREAST CANCER SCREENING BY MAMMOGRAM: ICD-10-CM

## 2023-09-29 DIAGNOSIS — Z78.0 POSTMENOPAUSAL STATUS: ICD-10-CM

## 2023-09-29 PROCEDURE — 77063 BREAST TOMOSYNTHESIS BI: CPT

## 2023-09-29 PROCEDURE — 77080 DXA BONE DENSITY AXIAL: CPT

## 2023-09-29 PROCEDURE — 77067 SCR MAMMO BI INCL CAD: CPT

## 2024-01-25 ENCOUNTER — OFFICE VISIT (OUTPATIENT)
Dept: CARDIOLOGY | Facility: CLINIC | Age: 64
End: 2024-01-25
Payer: COMMERCIAL

## 2024-01-25 VITALS
WEIGHT: 197 LBS | BODY MASS INDEX: 34.91 KG/M2 | SYSTOLIC BLOOD PRESSURE: 175 MMHG | DIASTOLIC BLOOD PRESSURE: 85 MMHG | HEART RATE: 53 BPM | HEIGHT: 63 IN | OXYGEN SATURATION: 97 %

## 2024-01-25 DIAGNOSIS — E78.2 MIXED HYPERLIPIDEMIA: ICD-10-CM

## 2024-01-25 DIAGNOSIS — I10 PRIMARY HYPERTENSION: ICD-10-CM

## 2024-01-25 DIAGNOSIS — I48.0 PAROXYSMAL ATRIAL FIBRILLATION: Primary | ICD-10-CM

## 2024-01-25 DIAGNOSIS — G45.9 TIA (TRANSIENT ISCHEMIC ATTACK): ICD-10-CM

## 2024-01-25 NOTE — PROGRESS NOTES
"    Subjective:     Encounter Date:01/25/2024      Patient ID: Sarah Brannon is a 64 y.o. female.    Chief Complaint:  History of Present Illness 64-year-old white female with history of paroxysmal fibrillation hypertension hyperlipidemia and TIA presents to my office for a follow-up.  Patient is currently still without any signs of chest pain or shortness of breath at rest or exertion radiograms of any PND orthopnea.  No palpitation but has some dizziness but no syncope.  Patient has some swelling of the feet.  Patient is taking all her medicines regular.  Patient does not smoke.    The following portions of the patient's history were reviewed and updated as appropriate: allergies, current medications, past family history, past medical history, past social history, past surgical history, and problem list.  Past Medical History:   Diagnosis Date    Arthritis     Baker's cyst of knee     RIGHT    Depression     Hiatal hernia     History of breast augmentation 04/07/2020    History of palpitations     Hyperlipemia     Hypertension     Migraine     PONV (postoperative nausea and vomiting)     SLOW TO WAKE    Seasonal allergies      Past Surgical History:   Procedure Laterality Date    BREAST AUGMENTATION Bilateral 06/2020    COLONOSCOPY Bilateral 01/06/2023    ENDOMETRIAL ABLATION      menustrual    FOOT SURGERY      rt and lt foot    LEEP      MOLE REMOVAL      NASAL SEPTUM SURGERY      REDUCTION MAMMAPLASTY      TOTAL KNEE ARTHROPLASTY Right 08/23/2016    Procedure: RT TOTAL KNEE ARTHROPLASTY WITH ROBERTO NAVIGATION, depuy;  Surgeon: Hakan Mccann MD;  Location: [facility] OR;  Service:      /85 Comment: RECHECK  Pulse 53   Ht 160 cm (63\")   Wt 89.4 kg (197 lb)   SpO2 97%   BMI 34.90 kg/m²   Family History   Problem Relation Age of Onset    Heart disease Mother     Heart disease Father        Current Outpatient Medications:     acyclovir (ZOVIRAX) 800 MG tablet, Take 1 tablet by mouth Daily., " Disp: 90 tablet, Rfl: 3    ASPIRIN 81 PO, Take  by mouth., Disp: , Rfl:     atorvastatin (LIPITOR) 40 MG tablet, Take 1 tablet by mouth Every Night., Disp: 90 tablet, Rfl: 3    buPROPion XL (WELLBUTRIN XL) 300 MG 24 hr tablet, Take 1 tablet by mouth Every Morning., Disp: , Rfl:     cetirizine (zyrTEC) 10 MG tablet, Take 1 tablet by mouth Daily., Disp: 90 tablet, Rfl: 3    clobetasol (TEMOVATE) 0.05 % cream, Apply  topically to the appropriate area as directed 2 (Two) Times a Day., Disp: 30 g, Rfl: 1    lisinopril (PRINIVIL,ZESTRIL) 10 MG tablet, Take 1 tablet by mouth Daily. For blood pressure, Disp: 90 tablet, Rfl: 3    metoprolol succinate XL (TOPROL-XL) 50 MG 24 hr tablet, Take 1 tablet by mouth Daily., Disp: 90 tablet, Rfl: 3    nystatin (MYCOSTATIN) [id number] UNIT/GM cream, Apply 1 application  topically to the appropriate area as directed 2 (Two) Times a Day., Disp: 30 g, Rfl: 0    potassium chloride 10 MEQ CR tablet, Take 1 tablet by mouth Daily., Disp: 90 tablet, Rfl: 3    venlafaxine XR (EFFEXOR-XR) 75 MG 24 hr capsule, Take 3 capsules by mouth Daily for 30 days., Disp: 90 capsule, Rfl: 0    Xarelto 20 MG tablet, TAKE 1 TABLET BY MOUTH EVERY DAY, Disp: 90 tablet, Rfl: 3  Allergies   Allergen Reactions    Bee Venom Swelling    Niacin Other (See Comments)     Pt states her face turned red    Shellfish-Derived Products Swelling     Lips became swollen.     Social History     Socioeconomic History    Marital status:    Tobacco Use    Smoking status: Never     Passive exposure: Never    Smokeless tobacco: Never   Vaping Use    Vaping Use: Never used   Substance and Sexual Activity    Alcohol use: No    Drug use: No    Sexual activity: Defer     Review of Systems   Constitutional: Positive for malaise/fatigue.   Cardiovascular:  Positive for leg swelling. Negative for chest pain, dyspnea on exertion and palpitations.   Respiratory:  Negative for cough and shortness of breath.    Gastrointestinal:  Negative  for abdominal pain, nausea and vomiting.   Neurological:  Positive for light-headedness. Negative for dizziness, focal weakness, headaches and numbness.   All other systems reviewed and are negative.             Objective:     Constitutional:       Appearance: Well-developed.   Eyes:      General: No scleral icterus.     Conjunctiva/sclera: Conjunctivae normal.   HENT:      Head: Normocephalic and atraumatic.   Neck:      Vascular: No carotid bruit or JVD.   Pulmonary:      Effort: Pulmonary effort is normal.      Breath sounds: Normal breath sounds. No wheezing. No rales.   Cardiovascular:      Normal rate. Regular rhythm.   Pulses:     Intact distal pulses.   Abdominal:      General: Bowel sounds are normal.      Palpations: Abdomen is soft.   Musculoskeletal:      Cervical back: Normal range of motion and neck supple. Skin:     General: Skin is warm and dry.      Findings: No rash.   Neurological:      Mental Status: Alert.       Procedures    Lab Review:         MDM    #1 paroxysmal atrial fibrillation  Patient has history of paroxysmal afibrillation but is currently stable on metoprolol and also on Xarelto for anticoagulation  2.  Hypertension  Patient blood pressure currently stable on metoprolol and lisinopril  3.  Hyperlipidemia  Patient is currently on atorvastatin and the lipid levels are followed by the primary care doctor  4 TIA  Patient had history of TIA but since she has been on Xarelto and she is not having any more symptoms and is followed by the primary care doctor    Patient's previous medical records, labs, and EKG were reviewed and discussed with the patient at today's visit.

## 2024-02-06 ENCOUNTER — OFFICE VISIT (OUTPATIENT)
Dept: FAMILY MEDICINE CLINIC | Facility: CLINIC | Age: 64
End: 2024-02-06
Payer: COMMERCIAL

## 2024-02-06 ENCOUNTER — OFFICE VISIT (OUTPATIENT)
Dept: PSYCHIATRY | Facility: CLINIC | Age: 64
End: 2024-02-06
Payer: COMMERCIAL

## 2024-02-06 VITALS
OXYGEN SATURATION: 98 % | HEIGHT: 61 IN | WEIGHT: 196.8 LBS | HEART RATE: 54 BPM | SYSTOLIC BLOOD PRESSURE: 200 MMHG | DIASTOLIC BLOOD PRESSURE: 120 MMHG | BODY MASS INDEX: 37.16 KG/M2

## 2024-02-06 VITALS
BODY MASS INDEX: 34.73 KG/M2 | SYSTOLIC BLOOD PRESSURE: 149 MMHG | WEIGHT: 196 LBS | TEMPERATURE: 97.6 F | DIASTOLIC BLOOD PRESSURE: 80 MMHG | HEART RATE: 56 BPM | HEIGHT: 63 IN | OXYGEN SATURATION: 97 %

## 2024-02-06 DIAGNOSIS — I10 PRIMARY HYPERTENSION: Primary | ICD-10-CM

## 2024-02-06 DIAGNOSIS — F33.1 MAJOR DEPRESSIVE DISORDER, RECURRENT EPISODE, MODERATE: Primary | ICD-10-CM

## 2024-02-06 PROCEDURE — 99213 OFFICE O/P EST LOW 20 MIN: CPT | Performed by: FAMILY MEDICINE

## 2024-02-06 PROCEDURE — 90792 PSYCH DIAG EVAL W/MED SRVCS: CPT

## 2024-02-06 RX ORDER — LOSARTAN POTASSIUM 50 MG/1
50 TABLET ORAL DAILY
Qty: 30 TABLET | Refills: 1 | Status: SHIPPED | OUTPATIENT
Start: 2024-02-06

## 2024-02-06 RX ORDER — BUPROPION HYDROCHLORIDE 150 MG/1
150 TABLET ORAL EVERY MORNING
Qty: 30 TABLET | Refills: 0 | Status: SHIPPED | OUTPATIENT
Start: 2024-02-06

## 2024-02-06 RX ORDER — VENLAFAXINE HYDROCHLORIDE 75 MG/1
225 CAPSULE, EXTENDED RELEASE ORAL DAILY
Qty: 90 CAPSULE | Refills: 2 | Status: SHIPPED | OUTPATIENT
Start: 2024-02-06 | End: 2024-05-06

## 2024-02-06 NOTE — PATIENT INSTRUCTIONS
Keep working to lose weight through healthy eating and exercise.   Stress reduction  Stop the lisinopril

## 2024-02-06 NOTE — PROGRESS NOTES
Subjective   Sarah Brannon is a 64 y.o. female.     History of Present Illness  Here for follow up on bp    Sarah Brannon is a 64-year-old female who presents today with concerns about her blood pressure, as it has been running high.    The patient's blood pressure was noted to be elevated at her cardiology appointment with Dr. Js Garcia. She subsequently began monitoring her blood pressure at home and notes that it has been elevated. Her systolic blood pressure has been running between the 150s mmHg and 190s mmHg, and her diastolic blood pressure has been in the 90s mmHg. She is still taking lisinopril and metoprolol. She confirms that she has been under a lot of stress lately. She denies consuming a lot of caffeine. She denies chest pain, palpitations, or headaches.    The following portions of the patient's history were reviewed and updated as appropriate: allergies, current medications, past family history, past medical history, past social history, past surgical history, and problem list.    Past Medical History:   Diagnosis Date    Arthritis     Baker's cyst of knee     RIGHT    Depression     Hiatal hernia     History of breast augmentation 04/07/2020    History of palpitations     Hyperlipemia     Hypertension     Migraine     PONV (postoperative nausea and vomiting)     SLOW TO WAKE    Seasonal allergies      Past Surgical History:   Procedure Laterality Date    BREAST AUGMENTATION Bilateral 06/2020    COLONOSCOPY Bilateral 01/06/2023    ENDOMETRIAL ABLATION      menustrual    FOOT SURGERY      rt and lt foot    LEEP      MOLE REMOVAL      NASAL SEPTUM SURGERY      REDUCTION MAMMAPLASTY      TOTAL KNEE ARTHROPLASTY Right 08/23/2016    Procedure: RT TOTAL KNEE ARTHROPLASTY WITH ROBERTO NAVIGATION, depuy;  Surgeon: Hakan Mccann MD;  Location: [facility];  Service:      Family History   Problem Relation Age of Onset    Heart disease Mother     Heart disease Father      Social History  "    Socioeconomic History    Marital status:    Tobacco Use    Smoking status: Never     Passive exposure: Never    Smokeless tobacco: Never   Vaping Use    Vaping Use: Never used   Substance and Sexual Activity    Alcohol use: No    Drug use: No    Sexual activity: Defer         Current Outpatient Medications:     acyclovir (ZOVIRAX) 800 MG tablet, Take 1 tablet by mouth Daily., Disp: 90 tablet, Rfl: 3    ASPIRIN 81 PO, Take  by mouth., Disp: , Rfl:     atorvastatin (LIPITOR) 40 MG tablet, Take 1 tablet by mouth Every Night., Disp: 90 tablet, Rfl: 3    buPROPion XL (WELLBUTRIN XL) 300 MG 24 hr tablet, Take 1 tablet by mouth Every Morning., Disp: , Rfl:     cetirizine (zyrTEC) 10 MG tablet, Take 1 tablet by mouth Daily., Disp: 90 tablet, Rfl: 3    clobetasol (TEMOVATE) 0.05 % cream, Apply  topically to the appropriate area as directed 2 (Two) Times a Day., Disp: 30 g, Rfl: 1    metoprolol succinate XL (TOPROL-XL) 50 MG 24 hr tablet, Take 1 tablet by mouth Daily., Disp: 90 tablet, Rfl: 3    potassium chloride 10 MEQ CR tablet, Take 1 tablet by mouth Daily., Disp: 90 tablet, Rfl: 3    venlafaxine XR (EFFEXOR-XR) 75 MG 24 hr capsule, Take 3 capsules by mouth Daily for 30 days., Disp: 90 capsule, Rfl: 0    Xarelto 20 MG tablet, TAKE 1 TABLET BY MOUTH EVERY DAY, Disp: 90 tablet, Rfl: 3    losartan (Cozaar) 50 MG tablet, Take 1 tablet by mouth Daily., Disp: 30 tablet, Rfl: 1    Review of Systems   Cardiovascular:  Negative for chest pain and palpitations.   Neurological:  Negative for headache.     /80 (BP Location: Left arm, Patient Position: Sitting, Cuff Size: Large Adult)   Pulse 56   Temp 97.6 °F (36.4 °C) (Temporal)   Ht 160 cm (63\")   Wt 88.9 kg (196 lb)   SpO2 97%   BMI 34.72 kg/m²   BMI is >= 30 and <35. (Class 1 Obesity). The following options were offered after discussion;: exercise counseling/recommendations       Objective   Physical Exam  Vitals and nursing note reviewed.   Constitutional: "       Appearance: Normal appearance. She is obese.   Cardiovascular:      Rate and Rhythm: Normal rate and regular rhythm.      Heart sounds: Normal heart sounds.   Pulmonary:      Effort: Pulmonary effort is normal.      Breath sounds: Normal breath sounds.   Musculoskeletal:      Right lower leg: No edema.      Left lower leg: No edema.   Neurological:      Mental Status: She is alert.           Assessment & Plan   Problems Addressed this Visit          Cardiac and Vasculature    Primary hypertension - Primary    Relevant Medications    losartan (Cozaar) 50 MG tablet     Diagnoses         Codes Comments    Primary hypertension    -  Primary ICD-10-CM: I10  ICD-9-CM: 401.9           1. Hypertension  Poorly controlled. I will have her discontinue the lisinopril and stay on the metoprolol. I have added losartan 50 mg. She was counseled on the need for stress reduction and weight loss. I will see her back in 3.5 to 4 weeks.         Transcribed from ambient dictation for Patricia Steele MD by Michelle Vaughn.  02/06/24   11:53 EST    Patient or patient representative verbalized consent to the visit recording.  I have personally performed the services described in this document as transcribed by the above individual, and it is both accurate and complete.
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
## 2024-03-07 ENCOUNTER — OFFICE VISIT (OUTPATIENT)
Dept: FAMILY MEDICINE CLINIC | Facility: CLINIC | Age: 64
End: 2024-03-07
Payer: COMMERCIAL

## 2024-03-07 VITALS
WEIGHT: 196 LBS | TEMPERATURE: 97.9 F | OXYGEN SATURATION: 100 % | HEIGHT: 61 IN | DIASTOLIC BLOOD PRESSURE: 82 MMHG | BODY MASS INDEX: 37 KG/M2 | SYSTOLIC BLOOD PRESSURE: 138 MMHG | HEART RATE: 63 BPM

## 2024-03-07 DIAGNOSIS — I10 PRIMARY HYPERTENSION: Primary | ICD-10-CM

## 2024-03-07 PROCEDURE — 99213 OFFICE O/P EST LOW 20 MIN: CPT | Performed by: FAMILY MEDICINE

## 2024-03-07 RX ORDER — LOSARTAN POTASSIUM 100 MG/1
50 TABLET ORAL DAILY
Qty: 90 TABLET | Refills: 3 | Status: SHIPPED | OUTPATIENT
Start: 2024-03-07

## 2024-03-07 NOTE — PATIENT INSTRUCTIONS
Continue the metoprolol  Start taking 2 of the losartan 50mg pills at the same time  Once you finish the 50mg pills start the new rx for the 100mg pill

## 2024-03-07 NOTE — PROGRESS NOTES
Subjective   Sarah Suresh is a 64 y.o. female.     ERENDIRA SURESH her YOB: 1960, she is a 64-year-old female who comes in for follow-up after her blood pressure medicines were adjusted a month ago.    She has been checking her systolic blood pressure at home and it ranges between 160 and 180. She went to a new psychiatrist the same day who cut her Wellbutrin in half from 300 mg to 150 mg a day as she thought that was contributing to her high blood pressure. She does not drink any soft drinks, but drinks root beer and no caffeine. She had chest pain yesterday, 03/07/2024, but she thinks it was because she lifted some concrete. She denies any headaches that are out of character for her.         The following portions of the patient's history were reviewed and updated as appropriate: allergies, current medications, past family history, past medical history, past social history, past surgical history, and problem list.  Past Medical History:   Diagnosis Date    Arthritis     Baker's cyst of knee     RIGHT    Depression     Hiatal hernia     History of breast augmentation 04/07/2020    History of palpitations     Hyperlipemia     Hypertension     Migraine     PONV (postoperative nausea and vomiting)     SLOW TO WAKE    Seasonal allergies      Past Surgical History:   Procedure Laterality Date    BREAST AUGMENTATION Bilateral 06/2020    COLONOSCOPY Bilateral 01/06/2023    ENDOMETRIAL ABLATION      menustrual    FOOT SURGERY      rt and lt foot    LEEP      MOLE REMOVAL      NASAL SEPTUM SURGERY      REDUCTION MAMMAPLASTY      TOTAL KNEE ARTHROPLASTY Right 08/23/2016    Procedure: RT TOTAL KNEE ARTHROPLASTY WITH ROBERTO NAVIGATION, depuy;  Surgeon: Hakan Mccann MD;  Location: [facility] OR;  Service:      Family History   Problem Relation Age of Onset    Heart disease Mother     Heart disease Father      Social History     Socioeconomic History    Marital status:    Tobacco Use    Smoking  "status: Never     Passive exposure: Never    Smokeless tobacco: Never   Vaping Use    Vaping status: Never Used   Substance and Sexual Activity    Alcohol use: No    Drug use: No    Sexual activity: Defer         Current Outpatient Medications:     acyclovir (ZOVIRAX) 800 MG tablet, Take 1 tablet by mouth Daily., Disp: 90 tablet, Rfl: 3    ASPIRIN 81 PO, Take  by mouth., Disp: , Rfl:     atorvastatin (LIPITOR) 40 MG tablet, Take 1 tablet by mouth Every Night., Disp: 90 tablet, Rfl: 3    buPROPion XL (WELLBUTRIN XL) 150 MG 24 hr tablet, Take 1 tablet by mouth Every Morning., Disp: 30 tablet, Rfl: 0    cetirizine (zyrTEC) 10 MG tablet, Take 1 tablet by mouth Daily., Disp: 90 tablet, Rfl: 3    clobetasol (TEMOVATE) 0.05 % cream, Apply  topically to the appropriate area as directed 2 (Two) Times a Day., Disp: 30 g, Rfl: 1    losartan (Cozaar) 100 MG tablet, Take 0.5 tablets by mouth Daily. For bp, Disp: 90 tablet, Rfl: 3    metoprolol succinate XL (TOPROL-XL) 50 MG 24 hr tablet, Take 1 tablet by mouth Daily., Disp: 90 tablet, Rfl: 3    potassium chloride 10 MEQ CR tablet, Take 1 tablet by mouth Daily., Disp: 90 tablet, Rfl: 3    venlafaxine XR (EFFEXOR-XR) 75 MG 24 hr capsule, Take 3 capsules by mouth Daily for 90 days., Disp: 90 capsule, Rfl: 2    Xarelto 20 MG tablet, TAKE 1 TABLET BY MOUTH EVERY DAY, Disp: 90 tablet, Rfl: 3    Review of Systems    A review of systems was performed, and the pertinent positives are noted in the HPI.     /82   Pulse 63   Temp 97.9 °F (36.6 °C) (Temporal)   Ht 154.9 cm (61\")   Wt 88.9 kg (196 lb)   SpO2 100%   BMI 37.03 kg/m²           Objective   Physical Exam  Vitals and nursing note reviewed.   Constitutional:       Appearance: Normal appearance. She is obese.   Cardiovascular:      Rate and Rhythm: Normal rate and regular rhythm.      Heart sounds: Normal heart sounds.   Pulmonary:      Effort: Pulmonary effort is normal.      Breath sounds: Normal breath sounds. "   Musculoskeletal:      Right lower leg: No edema.      Left lower leg: No edema.   Neurological:      Mental Status: She is alert.           Assessment & Plan   Problems Addressed this Visit          Cardiac and Vasculature    Primary hypertension - Primary    Relevant Medications    losartan (Cozaar) 100 MG tablet     Diagnoses         Codes Comments    Primary hypertension    -  Primary ICD-10-CM: I10  ICD-9-CM: 401.9             1. Primary hypertension.  Blood pressure has improved. I am going to increase her losartan to 100 mg. She will still continue the metoprolol XL 50 mg. She was counseled on the need to continue to work on weight loss.    Follow-up  I will see her back in 3 months.               Transcribed from ambient dictation for Patricia Steele MD by Michelle Denton.  03/07/24   15:01 EST    Patient or patient representative verbalized consent to the visit recording.  I have personally performed the services described in this document as transcribed by the above individual, and it is both accurate and complete.

## 2024-03-14 ENCOUNTER — OFFICE VISIT (OUTPATIENT)
Dept: PSYCHIATRY | Facility: CLINIC | Age: 64
End: 2024-03-14
Payer: COMMERCIAL

## 2024-03-14 VITALS — OXYGEN SATURATION: 97 % | SYSTOLIC BLOOD PRESSURE: 169 MMHG | HEART RATE: 52 BPM | DIASTOLIC BLOOD PRESSURE: 86 MMHG

## 2024-03-14 DIAGNOSIS — F33.1 MAJOR DEPRESSIVE DISORDER, RECURRENT EPISODE, MODERATE: ICD-10-CM

## 2024-03-14 RX ORDER — BUPROPION HYDROCHLORIDE 150 MG/1
150 TABLET ORAL EVERY MORNING
Qty: 30 TABLET | Refills: 2 | Status: SHIPPED | OUTPATIENT
Start: 2024-03-14
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
## 2024-03-18 RX ORDER — RIVAROXABAN 20 MG/1
TABLET, FILM COATED ORAL
Qty: 90 TABLET | Refills: 0 | Status: SHIPPED | OUTPATIENT
Start: 2024-03-18

## 2024-03-18 NOTE — TELEPHONE ENCOUNTER
Rx Refill Note  Requested Prescriptions     Pending Prescriptions Disp Refills    Xarelto 20 MG tablet [Pharmacy Med Name: Xarelto Oral Tablet 20 MG] 90 tablet 0     Sig: TAKE 1 TABLET BY MOUTH EVERY DAY      Last office visit with prescribing clinician: 1/25/2024   Last telemedicine visit with prescribing clinician: Visit date not found   Next office visit with prescribing clinician: 7/31/2024                         Would you like a call back once the refill request has been completed: [] Yes [] No    If the office needs to give you a call back, can they leave a voicemail: [] Yes [] No    Angela Oates MA  03/18/24, 07:19 EDT

## 2024-03-27 ENCOUNTER — TELEPHONE (OUTPATIENT)
Dept: CARDIOLOGY | Facility: CLINIC | Age: 64
End: 2024-03-27
Payer: COMMERCIAL

## 2024-03-27 NOTE — TELEPHONE ENCOUNTER
Patient dropped off blood pressure readings per Dr. Garcia he is adding Amlodipine 5 mg daily. Need to know which pharmacy patient would like RX sent to.         Called patient, no answer, left voicemail.

## 2024-03-28 ENCOUNTER — TELEPHONE (OUTPATIENT)
Dept: CARDIOLOGY | Facility: CLINIC | Age: 64
End: 2024-03-28
Payer: COMMERCIAL

## 2024-03-28 RX ORDER — AMLODIPINE BESYLATE 5 MG/1
5 TABLET ORAL DAILY
Qty: 90 TABLET | Refills: 3 | Status: SHIPPED | OUTPATIENT
Start: 2024-03-28

## 2024-03-28 NOTE — TELEPHONE ENCOUNTER
"  Caller: Sarah Brannon \"ERENDIRA\"    Relationship: Self    Best call back number:[phone]    What is the best time to reach you: ANY    Who are you requesting to speak with (clinical staff, provider,  specific staff member): ANY        What was the call regarding: PATIENT MISSED A CALL, CALLING BACK ABOUT HIGH BP, [facility] IN [place] IS HER PHARMACY.      "

## 2024-05-12 DIAGNOSIS — F33.1 MAJOR DEPRESSIVE DISORDER, RECURRENT EPISODE, MODERATE: ICD-10-CM

## 2024-05-13 RX ORDER — VENLAFAXINE HYDROCHLORIDE 75 MG/1
225 CAPSULE, EXTENDED RELEASE ORAL DAILY
Qty: 90 CAPSULE | Refills: 0 | OUTPATIENT
Start: 2024-05-13

## 2024-05-19 DIAGNOSIS — F33.1 MAJOR DEPRESSIVE DISORDER, RECURRENT EPISODE, MODERATE: ICD-10-CM

## 2024-05-20 DIAGNOSIS — F33.1 MAJOR DEPRESSIVE DISORDER, RECURRENT EPISODE, MODERATE: ICD-10-CM

## 2024-05-20 RX ORDER — VENLAFAXINE HYDROCHLORIDE 75 MG/1
225 CAPSULE, EXTENDED RELEASE ORAL DAILY
Qty: 90 CAPSULE | Refills: 0 | OUTPATIENT
Start: 2024-05-20

## 2024-05-21 RX ORDER — VENLAFAXINE HYDROCHLORIDE 75 MG/1
225 CAPSULE, EXTENDED RELEASE ORAL DAILY
Qty: 90 CAPSULE | Refills: 0 | OUTPATIENT
Start: 2024-05-21

## 2024-05-23 DIAGNOSIS — F33.1 MAJOR DEPRESSIVE DISORDER, RECURRENT EPISODE, MODERATE: ICD-10-CM

## 2024-05-24 DIAGNOSIS — F33.1 MAJOR DEPRESSIVE DISORDER, RECURRENT EPISODE, MODERATE: ICD-10-CM

## 2024-05-24 RX ORDER — VENLAFAXINE HYDROCHLORIDE 75 MG/1
225 CAPSULE, EXTENDED RELEASE ORAL DAILY
Qty: 90 CAPSULE | Refills: 0 | OUTPATIENT
Start: 2024-05-24

## 2024-05-24 NOTE — TELEPHONE ENCOUNTER
Rx Refill Note  Requested Prescriptions     Pending Prescriptions Disp Refills    venlafaxine XR (EFFEXOR-XR) 75 MG 24 hr capsule 90 capsule 2     Sig: Take 3 capsules by mouth Daily for 90 days.      Last office visit with prescribing clinician: 3/14/2024     Next office visit with prescribing clinician: 6/6/2024     Office Visit with Tamara Bell, YOMAIRA, APRN (03/14/2024)     Med check - 6-6-2024    Harleen Carrasco MA  05/24/24, 12:23 EDT

## 2024-05-28 RX ORDER — VENLAFAXINE HYDROCHLORIDE 75 MG/1
225 CAPSULE, EXTENDED RELEASE ORAL DAILY
Qty: 90 CAPSULE | Refills: 2 | Status: SHIPPED | OUTPATIENT
Start: 2024-05-28 | End: 2024-08-26

## 2024-06-03 RX ORDER — LOSARTAN POTASSIUM 50 MG/1
50 TABLET ORAL DAILY
Qty: 30 TABLET | Refills: 0 | OUTPATIENT
Start: 2024-06-03

## 2024-06-07 ENCOUNTER — OFFICE VISIT (OUTPATIENT)
Dept: FAMILY MEDICINE CLINIC | Facility: CLINIC | Age: 64
End: 2024-06-07
Payer: COMMERCIAL

## 2024-06-07 VITALS
BODY MASS INDEX: 36.82 KG/M2 | WEIGHT: 195 LBS | HEIGHT: 61 IN | OXYGEN SATURATION: 96 % | HEART RATE: 47 BPM | TEMPERATURE: 97.6 F | SYSTOLIC BLOOD PRESSURE: 118 MMHG | DIASTOLIC BLOOD PRESSURE: 74 MMHG

## 2024-06-07 DIAGNOSIS — I10 PRIMARY HYPERTENSION: ICD-10-CM

## 2024-06-07 PROCEDURE — 99213 OFFICE O/P EST LOW 20 MIN: CPT | Performed by: FAMILY MEDICINE

## 2024-06-07 RX ORDER — LOSARTAN POTASSIUM 50 MG/1
1 TABLET ORAL DAILY
COMMUNITY
Start: 2024-05-23 | End: 2024-06-07 | Stop reason: DRUGHIGH

## 2024-06-07 RX ORDER — LOSARTAN POTASSIUM 100 MG/1
100 TABLET ORAL DAILY
Qty: 90 TABLET | Refills: 3 | Status: SHIPPED | OUTPATIENT
Start: 2024-06-07

## 2024-06-07 NOTE — PATIENT INSTRUCTIONS
Keep working to lose weight through healthy eating and exercise.   Take 2 of the mg losartan 50 mg pills until you run out then start the 100mg

## 2024-06-07 NOTE — PROGRESS NOTES
Subjective   Sarah Brannon is a 64 y.o. female.     History of Present Illness  The patient presents for follow up on her bp after her meds were adjusted    The patient reports no adverse reactions to her current medication regimen. She denies experiencing any chest pain, however, she does report occasional palpitations. Initially, she was prescribed 100mg but there was confustion and a 50mg rx was filled.  As a result, she has been on a 50 mg dose for 2weeks, which she reports was not as effective in managing her blood pressure as the 100mg. Her blood pressure readings at home have been consistently high, ranging from 130 to 160. She plans to acquire a new blood pressure cuff to assess its impact on her blood pressure.       The following portions of the patient's history were reviewed and updated as appropriate: allergies, current medications, past family history, past medical history, past social history, past surgical history, and problem list.  Past Medical History:   Diagnosis Date    Arthritis     Baker's cyst of knee     RIGHT    Depression     Hiatal hernia     History of breast augmentation 04/07/2020    History of palpitations     Hyperlipemia     Hypertension     Migraine     PONV (postoperative nausea and vomiting)     SLOW TO WAKE    Seasonal allergies      Past Surgical History:   Procedure Laterality Date    BREAST AUGMENTATION Bilateral 06/2020    COLONOSCOPY Bilateral 01/06/2023    ENDOMETRIAL ABLATION      menustrual    FOOT SURGERY      rt and lt foot    LEEP      MOLE REMOVAL      NASAL SEPTUM SURGERY      REDUCTION MAMMAPLASTY      TOTAL KNEE ARTHROPLASTY Right 08/23/2016    Procedure: RT TOTAL KNEE ARTHROPLASTY WITH ROBERTO NAVIGATION, depuy;  Surgeon: Hakan Mccann MD;  Location: [facility] OR;  Service:      Family History   Problem Relation Age of Onset    Heart disease Mother     Heart disease Father      Social History     Socioeconomic History    Marital status:   "  Tobacco Use    Smoking status: Never     Passive exposure: Never    Smokeless tobacco: Never   Vaping Use    Vaping status: Never Used   Substance and Sexual Activity    Alcohol use: No    Drug use: No    Sexual activity: Defer         Current Outpatient Medications:     acyclovir (ZOVIRAX) 800 MG tablet, Take 1 tablet by mouth Daily., Disp: 90 tablet, Rfl: 3    amLODIPine (NORVASC) 5 MG tablet, Take 1 tablet by mouth Daily., Disp: 90 tablet, Rfl: 3    ASPIRIN 81 PO, Take  by mouth., Disp: , Rfl:     atorvastatin (LIPITOR) 40 MG tablet, Take 1 tablet by mouth Every Night., Disp: 90 tablet, Rfl: 3    buPROPion XL (WELLBUTRIN XL) 150 MG 24 hr tablet, Take 1 tablet by mouth Every Morning., Disp: 30 tablet, Rfl: 2    cetirizine (zyrTEC) 10 MG tablet, Take 1 tablet by mouth Daily., Disp: 90 tablet, Rfl: 3    clobetasol (TEMOVATE) 0.05 % cream, Apply  topically to the appropriate area as directed 2 (Two) Times a Day., Disp: 30 g, Rfl: 1    losartan (Cozaar) 100 MG tablet, Take 1 tablet by mouth Daily., Disp: 90 tablet, Rfl: 3    metoprolol succinate XL (TOPROL-XL) 50 MG 24 hr tablet, Take 1 tablet by mouth Daily., Disp: 90 tablet, Rfl: 3    potassium chloride 10 MEQ CR tablet, Take 1 tablet by mouth Daily., Disp: 90 tablet, Rfl: 3    venlafaxine XR (EFFEXOR-XR) 75 MG 24 hr capsule, Take 3 capsules by mouth Daily for 90 days., Disp: 90 capsule, Rfl: 2    Xarelto 20 MG tablet, TAKE 1 TABLET BY MOUTH EVERY DAY, Disp: 90 tablet, Rfl: 0    Review of Systems  ROS done and noted in HPI    /74 (BP Location: Left arm, Patient Position: Sitting, Cuff Size: Large Adult)   Pulse (!) 47   Temp 97.6 °F (36.4 °C) (Temporal)   Ht 154.9 cm (61\")   Wt 88.5 kg (195 lb)   SpO2 96%   BMI 36.84 kg/m²           Objective   Physical Exam  Vitals and nursing note reviewed.   Constitutional:       Appearance: Normal appearance. She is obese.   Cardiovascular:      Rate and Rhythm: Normal rate and regular rhythm.      Heart sounds: " Normal heart sounds.   Pulmonary:      Effort: Pulmonary effort is normal.      Breath sounds: Normal breath sounds.   Musculoskeletal:      Right lower leg: No edema.      Left lower leg: No edema.   Neurological:      Mental Status: She is alert.       Physical Exam         Results         Assessment & Plan   Problems Addressed this Visit          Cardiac and Vasculature    Primary hypertension    Relevant Medications    losartan (Cozaar) 100 MG tablet     Diagnoses         Codes Comments    Primary hypertension     ICD-10-CM: I10  ICD-9-CM: 401.9           Assessment & Plan  1. Hypertension.  The patient's blood pressure readings at home have been consistently high, ranging from 130 to 160. A prescription for losartan 100 mg has been issued. The patient is advised to take two 50 mg tablets until her current supply is exhausted, at which point she will start the 100mg pill. Should she experience significant weight loss or lightheadedness she is to inform me immediately.    Follow-up  A follow-up appointment is scheduled for six months from now.            Patient or patient representative verbalized consent for the use of Ambient Listening during the visit with  Patricia Steele MD for chart documentation. 6/7/2024  15:03 EDT

## 2024-06-13 DIAGNOSIS — F33.1 MAJOR DEPRESSIVE DISORDER, RECURRENT EPISODE, MODERATE: ICD-10-CM

## 2024-06-14 ENCOUNTER — OFFICE VISIT (OUTPATIENT)
Dept: PSYCHIATRY | Facility: CLINIC | Age: 64
End: 2024-06-14
Payer: COMMERCIAL

## 2024-06-14 VITALS — SYSTOLIC BLOOD PRESSURE: 138 MMHG | DIASTOLIC BLOOD PRESSURE: 87 MMHG

## 2024-06-14 DIAGNOSIS — F33.9 RECURRENT MAJOR DEPRESSION RESISTANT TO TREATMENT: Primary | ICD-10-CM

## 2024-06-14 DIAGNOSIS — F41.1 GENERALIZED ANXIETY DISORDER: ICD-10-CM

## 2024-06-14 RX ORDER — BUPROPION HYDROCHLORIDE 300 MG/1
300 TABLET ORAL EVERY MORNING
Qty: 30 TABLET | Refills: 1 | Status: SHIPPED | OUTPATIENT
Start: 2024-06-14

## 2024-06-14 RX ORDER — BUPROPION HYDROCHLORIDE 150 MG/1
150 TABLET ORAL EVERY MORNING
Qty: 30 TABLET | Refills: 0 | OUTPATIENT
Start: 2024-06-14
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
## 2024-06-27 RX ORDER — RIVAROXABAN 20 MG/1
TABLET, FILM COATED ORAL
Qty: 90 TABLET | Refills: 1 | Status: SHIPPED | OUTPATIENT
Start: 2024-06-27

## 2024-06-27 NOTE — TELEPHONE ENCOUNTER
Rx Refill Note  Requested Prescriptions     Pending Prescriptions Disp Refills    Xarelto 20 MG tablet [Pharmacy Med Name: Xarelto Oral Tablet 20 MG] 90 tablet 0     Sig: TAKE 1 TABLET BY MOUTH EVERY DAY      Last office visit with prescribing clinician: 1/25/2024   Last telemedicine visit with prescribing clinician: Visit date not found   Next office visit with prescribing clinician: 7/31/2024                         Would you like a call back once the refill request has been completed: [] Yes [] No    If the office needs to give you a call back, can they leave a voicemail: [] Yes [] No    Cuca Franks MA  06/27/24, 08:22 EDT

## 2024-07-31 ENCOUNTER — TELEPHONE (OUTPATIENT)
Dept: CARDIOLOGY | Facility: CLINIC | Age: 64
End: 2024-07-31

## 2024-07-31 ENCOUNTER — OFFICE VISIT (OUTPATIENT)
Dept: CARDIOLOGY | Facility: CLINIC | Age: 64
End: 2024-07-31
Payer: COMMERCIAL

## 2024-07-31 VITALS
BODY MASS INDEX: 36.63 KG/M2 | HEART RATE: 52 BPM | WEIGHT: 194 LBS | DIASTOLIC BLOOD PRESSURE: 62 MMHG | OXYGEN SATURATION: 98 % | HEIGHT: 61 IN | SYSTOLIC BLOOD PRESSURE: 131 MMHG

## 2024-07-31 DIAGNOSIS — E78.2 MIXED HYPERLIPIDEMIA: ICD-10-CM

## 2024-07-31 DIAGNOSIS — G45.9 TIA (TRANSIENT ISCHEMIC ATTACK): ICD-10-CM

## 2024-07-31 DIAGNOSIS — I10 PRIMARY HYPERTENSION: ICD-10-CM

## 2024-07-31 DIAGNOSIS — Z01.810 PREOP CARDIOVASCULAR EXAM: ICD-10-CM

## 2024-07-31 DIAGNOSIS — I48.0 PAROXYSMAL ATRIAL FIBRILLATION: Primary | ICD-10-CM

## 2024-07-31 NOTE — PROGRESS NOTES
"    Subjective:     Encounter Date:07/31/2024      Patient ID: Sarah Brannon is a 64 y.o. female.    Chief Complaint:  History of Present Illness 64-year-old white female with history of paroxysmal fibrillation history of TIA hypertension hyperlipidemia presents to the office for follow-up.  Patient needs preop evaluation too.  Patient does not have any chest pain or shortness of breath at rest or exertion radiograms any PND orthopnea.  She has occasional palpitation with dizziness.  No syncope or she has some swelling of the feet.  She is taking her medicines regularly.  She does not smoke.    The following portions of the patient's history were reviewed and updated as appropriate: allergies, current medications, past family history, past medical history, past social history, past surgical history, and problem list.  Past Medical History:   Diagnosis Date    Arthritis     Baker's cyst of knee     RIGHT    Depression     Hiatal hernia     History of breast augmentation 04/07/2020    History of palpitations     Hyperlipemia     Hypertension     Migraine     PONV (postoperative nausea and vomiting)     SLOW TO WAKE    Seasonal allergies      Past Surgical History:   Procedure Laterality Date    BREAST AUGMENTATION Bilateral 06/2020    COLONOSCOPY Bilateral 01/06/2023    ENDOMETRIAL ABLATION      menustrual    FOOT SURGERY      rt and lt foot    LEEP      MOLE REMOVAL      NASAL SEPTUM SURGERY      REDUCTION MAMMAPLASTY      TOTAL KNEE ARTHROPLASTY Right 08/23/2016    Procedure: RT TOTAL KNEE ARTHROPLASTY WITH ROBERTO NAVIGATION, depuy;  Surgeon: Hakan Mccann MD;  Location: [facility]  Service:      /62   Pulse 52   Ht 154.9 cm (61\")   Wt 88 kg (194 lb)   SpO2 98%   BMI 36.66 kg/m²   Family History   Problem Relation Age of Onset    Heart disease Mother     Heart disease Father        Current Outpatient Medications:     acyclovir (ZOVIRAX) 800 MG tablet, Take 1 tablet by mouth Daily., Disp: 90 " tablet, Rfl: 3    amLODIPine (NORVASC) 5 MG tablet, Take 1 tablet by mouth Daily., Disp: 90 tablet, Rfl: 3    ASPIRIN 81 PO, Take  by mouth., Disp: , Rfl:     atorvastatin (LIPITOR) 40 MG tablet, Take 1 tablet by mouth Every Night., Disp: 90 tablet, Rfl: 3    buPROPion XL (WELLBUTRIN XL) 300 MG 24 hr tablet, Take 1 tablet by mouth Every Morning., Disp: 30 tablet, Rfl: 1    cetirizine (zyrTEC) 10 MG tablet, Take 1 tablet by mouth Daily., Disp: 90 tablet, Rfl: 3    clobetasol (TEMOVATE) 0.05 % cream, Apply  topically to the appropriate area as directed 2 (Two) Times a Day., Disp: 30 g, Rfl: 1    losartan (Cozaar) 100 MG tablet, Take 1 tablet by mouth Daily., Disp: 90 tablet, Rfl: 3    metoprolol succinate XL (TOPROL-XL) 50 MG 24 hr tablet, Take 1 tablet by mouth Daily., Disp: 90 tablet, Rfl: 3    potassium chloride 10 MEQ CR tablet, Take 1 tablet by mouth Daily., Disp: 90 tablet, Rfl: 3    rivaroxaban (Xarelto) 20 MG tablet, TAKE 1 TABLET BY MOUTH EVERY DAY, Disp: 90 tablet, Rfl: 1    venlafaxine XR (EFFEXOR-XR) 75 MG 24 hr capsule, Take 3 capsules by mouth Daily for 90 days., Disp: 90 capsule, Rfl: 2  Allergies   Allergen Reactions    Bee Venom Swelling    Niacin Other (See Comments)     Pt states her face turned red    Shellfish-Derived Products Swelling     Lips became swollen.     Social History     Socioeconomic History    Marital status:    Tobacco Use    Smoking status: Never     Passive exposure: Never    Smokeless tobacco: Never   Vaping Use    Vaping status: Never Used   Substance and Sexual Activity    Alcohol use: No    Drug use: No    Sexual activity: Defer     Review of Systems   Constitutional: Negative for malaise/fatigue.   Cardiovascular:  Positive for leg swelling and palpitations. Negative for chest pain and dyspnea on exertion.   Respiratory:  Negative for cough and shortness of breath.    Gastrointestinal:  Negative for abdominal pain, nausea and vomiting.   Neurological:  Positive for  dizziness. Negative for focal weakness, headaches, light-headedness and numbness.   All other systems reviewed and are negative.             Objective:     Constitutional:       Appearance: Well-developed.   Eyes:      General: No scleral icterus.     Conjunctiva/sclera: Conjunctivae normal.   HENT:      Head: Normocephalic and atraumatic.   Neck:      Vascular: No carotid bruit or JVD.   Pulmonary:      Effort: Pulmonary effort is normal.      Breath sounds: Normal breath sounds. No wheezing. No rales.   Cardiovascular:      Normal rate. Regular rhythm.   Pulses:     Intact distal pulses.   Abdominal:      General: Bowel sounds are normal.      Palpations: Abdomen is soft.   Musculoskeletal:      Cervical back: Normal range of motion and neck supple. Skin:     General: Skin is warm and dry.      Findings: No rash.   Neurological:      Mental Status: Alert.         ECG 12 Lead    Date/Time: 7/31/2024 11:40 AM  Performed by: Js Garcia MD    Authorized by: Js Garcia MD  Comments: Sinus bradycardia with sinus arrhythmia  Nonspecific ST segment abnormality  Abnormal EKG  No previous ECGs available          Lab Review:         MDM    #1 paroxysmal afibrillation  Patient has history of paroxysmal fibrillation is currently stable on medications including metoprolol and Xarelto for anticoagulation  2.  Hypertension  Patient blood pressure currently stable on metoprolol and losartan and amlodipine  3.  Hyperlipidemia  Patient is on Lipitor and the lipid levels are well within normal limits  4.  Preop evaluation  Patient is cleared for surgery from cardiac status with low risk and can hold the Xarelto for 2 to 3 days.    Patient's previous medical records, labs, and EKG were reviewed and discussed with the patient at today's visit.

## 2024-07-31 NOTE — TELEPHONE ENCOUNTER
FACILITY: [facility]  DR: KINDRA KIM  PHONE: [phone]  FAX: [phone]  PROCEDURE: LEFT CMC ARTHROPLASTY TRAPEZIUM EXCISION INTERMETACARPAL LIGAMENT RECONSTRUCTION  SCHEDULED: 08/26/2024  MEDS TO HOLD: XARELTO    PATIENT WAS SEEN IN THE OFFICE TODAY BY  AND CLEARANCE WAS PLACED WITH HER CHART.

## 2024-08-13 ENCOUNTER — OFFICE VISIT (OUTPATIENT)
Dept: PSYCHIATRY | Facility: CLINIC | Age: 64
End: 2024-08-13
Payer: COMMERCIAL

## 2024-08-13 VITALS
OXYGEN SATURATION: 96 % | SYSTOLIC BLOOD PRESSURE: 135 MMHG | BODY MASS INDEX: 36.2 KG/M2 | DIASTOLIC BLOOD PRESSURE: 85 MMHG | HEART RATE: 49 BPM | WEIGHT: 191.6 LBS

## 2024-08-13 DIAGNOSIS — F33.9 RECURRENT MAJOR DEPRESSION RESISTANT TO TREATMENT: Primary | ICD-10-CM

## 2024-08-13 DIAGNOSIS — F41.1 GENERALIZED ANXIETY DISORDER: ICD-10-CM

## 2024-08-13 PROCEDURE — 99214 OFFICE O/P EST MOD 30 MIN: CPT
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
## 2024-08-28 DIAGNOSIS — F33.1 MAJOR DEPRESSIVE DISORDER, RECURRENT EPISODE, MODERATE: ICD-10-CM

## 2024-08-29 RX ORDER — VENLAFAXINE HYDROCHLORIDE 75 MG/1
225 CAPSULE, EXTENDED RELEASE ORAL DAILY
Qty: 90 CAPSULE | Refills: 2 | Status: SHIPPED | OUTPATIENT
Start: 2024-08-29

## 2024-09-17 DIAGNOSIS — F33.9 RECURRENT MAJOR DEPRESSION RESISTANT TO TREATMENT: ICD-10-CM

## 2024-09-18 RX ORDER — BUPROPION HYDROCHLORIDE 300 MG/1
300 TABLET ORAL EVERY MORNING
Qty: 30 TABLET | Refills: 2 | Status: SHIPPED | OUTPATIENT
Start: 2024-09-18

## 2024-09-23 RX ORDER — ATORVASTATIN CALCIUM 40 MG/1
40 TABLET, FILM COATED ORAL
Qty: 90 TABLET | Refills: 0 | Status: SHIPPED | OUTPATIENT
Start: 2024-09-23

## 2024-09-24 RX ORDER — ACYCLOVIR 800 MG/1
800 TABLET ORAL DAILY
Qty: 90 TABLET | Refills: 0 | Status: SHIPPED | OUTPATIENT
Start: 2024-09-24

## 2024-09-25 ENCOUNTER — TELEPHONE (OUTPATIENT)
Dept: PSYCHIATRY | Facility: CLINIC | Age: 64
End: 2024-09-25
Payer: COMMERCIAL

## 2024-09-25 DIAGNOSIS — F33.9 RECURRENT MAJOR DEPRESSION RESISTANT TO TREATMENT: ICD-10-CM

## 2024-09-26 RX ORDER — POTASSIUM CHLORIDE 750 MG/1
10 TABLET, EXTENDED RELEASE ORAL DAILY
Qty: 90 TABLET | Refills: 0 | Status: SHIPPED | OUTPATIENT
Start: 2024-09-26

## 2024-09-27 DIAGNOSIS — F33.9 RECURRENT MAJOR DEPRESSION RESISTANT TO TREATMENT: ICD-10-CM

## 2024-09-30 ENCOUNTER — PRIOR AUTHORIZATION (OUTPATIENT)
Dept: PSYCHIATRY | Facility: CLINIC | Age: 64
End: 2024-09-30
Payer: COMMERCIAL

## 2024-09-30 NOTE — TELEPHONE ENCOUNTER
Prior authorization submitted to [facility] through covermymeds regarding Rexulti 1 mg tablet.  Approved. Faxed to pharmacy.  Attempted to contact patient.  No answer.  LMV informing patient Rexulti had been approved and information was sent to [facility] pharmacy.  Instructed patient to call with any questions or concerns.

## 2024-10-04 ENCOUNTER — LAB (OUTPATIENT)
Dept: FAMILY MEDICINE CLINIC | Facility: CLINIC | Age: 64
End: 2024-10-04
Payer: COMMERCIAL

## 2024-10-04 ENCOUNTER — OFFICE VISIT (OUTPATIENT)
Dept: FAMILY MEDICINE CLINIC | Facility: CLINIC | Age: 64
End: 2024-10-04
Payer: COMMERCIAL

## 2024-10-04 VITALS
WEIGHT: 190 LBS | BODY MASS INDEX: 35.87 KG/M2 | DIASTOLIC BLOOD PRESSURE: 78 MMHG | HEIGHT: 61 IN | SYSTOLIC BLOOD PRESSURE: 128 MMHG

## 2024-10-04 DIAGNOSIS — E66.9 OBESITY (BMI 30-39.9): ICD-10-CM

## 2024-10-04 DIAGNOSIS — Z12.11 COLON CANCER SCREENING: ICD-10-CM

## 2024-10-04 DIAGNOSIS — R06.89 GASPING FOR BREATH: ICD-10-CM

## 2024-10-04 DIAGNOSIS — Z00.01 ENCOUNTER FOR GENERAL ADULT MEDICAL EXAMINATION WITH ABNORMAL FINDINGS: ICD-10-CM

## 2024-10-04 DIAGNOSIS — Z12.4 SCREENING FOR CERVICAL CANCER: ICD-10-CM

## 2024-10-04 DIAGNOSIS — E55.9 VITAMIN D DEFICIENCY: ICD-10-CM

## 2024-10-04 DIAGNOSIS — R40.0 DAYTIME SOMNOLENCE: ICD-10-CM

## 2024-10-04 DIAGNOSIS — R06.83 SNORING: ICD-10-CM

## 2024-10-04 DIAGNOSIS — Z00.01 ENCOUNTER FOR GENERAL ADULT MEDICAL EXAMINATION WITH ABNORMAL FINDINGS: Primary | ICD-10-CM

## 2024-10-04 DIAGNOSIS — Z23 NEED FOR INFLUENZA VACCINATION: ICD-10-CM

## 2024-10-04 LAB
25(OH)D3 SERPL-MCNC: 23.9 NG/ML (ref 30–100)
ALBUMIN SERPL-MCNC: 4.2 G/DL (ref 3.5–5.2)
ALBUMIN/GLOB SERPL: 1.6 G/DL
ALP SERPL-CCNC: 105 U/L (ref 39–117)
ALT SERPL W P-5'-P-CCNC: 12 U/L (ref 1–33)
ANION GAP SERPL CALCULATED.3IONS-SCNC: 10.8 MMOL/L (ref 5–15)
AST SERPL-CCNC: 15 U/L (ref 1–32)
BASOPHILS # BLD AUTO: 0.02 10*3/MM3 (ref 0–0.2)
BASOPHILS NFR BLD AUTO: 0.3 % (ref 0–1.5)
BILIRUB SERPL-MCNC: 0.3 MG/DL (ref 0–1.2)
BUN SERPL-MCNC: 17 MG/DL (ref 8–23)
BUN/CREAT SERPL: 17.7 (ref 7–25)
CALCIUM SPEC-SCNC: 9.4 MG/DL (ref 8.6–10.5)
CHLORIDE SERPL-SCNC: 103 MMOL/L (ref 98–107)
CHOLEST SERPL-MCNC: 158 MG/DL (ref 0–200)
CO2 SERPL-SCNC: 25.2 MMOL/L (ref 22–29)
CREAT SERPL-MCNC: 0.96 MG/DL (ref 0.57–1)
DEPRECATED RDW RBC AUTO: 42.9 FL (ref 37–54)
EGFRCR SERPLBLD CKD-EPI 2021: 65.4 ML/MIN/1.73
EOSINOPHIL # BLD AUTO: 0.29 10*3/MM3 (ref 0–0.4)
EOSINOPHIL NFR BLD AUTO: 4.5 % (ref 0.3–6.2)
ERYTHROCYTE [DISTWIDTH] IN BLOOD BY AUTOMATED COUNT: 12.9 % (ref 12.3–15.4)
GLOBULIN UR ELPH-MCNC: 2.6 GM/DL
GLUCOSE SERPL-MCNC: 61 MG/DL (ref 65–99)
HBA1C MFR BLD: 5.8 % (ref 4.8–5.6)
HCT VFR BLD AUTO: 39 % (ref 34–46.6)
HDLC SERPL-MCNC: 57 MG/DL (ref 40–60)
HEMOCCULT STL QL IA: NEGATIVE
HGB BLD-MCNC: 13 G/DL (ref 12–15.9)
IMM GRANULOCYTES # BLD AUTO: 0.02 10*3/MM3 (ref 0–0.05)
IMM GRANULOCYTES NFR BLD AUTO: 0.3 % (ref 0–0.5)
LDLC SERPL CALC-MCNC: 83 MG/DL (ref 0–100)
LDLC/HDLC SERPL: 1.42 {RATIO}
LYMPHOCYTES # BLD AUTO: 2.27 10*3/MM3 (ref 0.7–3.1)
LYMPHOCYTES NFR BLD AUTO: 35.5 % (ref 19.6–45.3)
MCH RBC QN AUTO: 30.9 PG (ref 26.6–33)
MCHC RBC AUTO-ENTMCNC: 33.3 G/DL (ref 31.5–35.7)
MCV RBC AUTO: 92.6 FL (ref 79–97)
MONOCYTES # BLD AUTO: 0.62 10*3/MM3 (ref 0.1–0.9)
MONOCYTES NFR BLD AUTO: 9.7 % (ref 5–12)
NEUTROPHILS NFR BLD AUTO: 3.18 10*3/MM3 (ref 1.7–7)
NEUTROPHILS NFR BLD AUTO: 49.7 % (ref 42.7–76)
NRBC BLD AUTO-RTO: 0 /100 WBC (ref 0–0.2)
PLATELET # BLD AUTO: 311 10*3/MM3 (ref 140–450)
PMV BLD AUTO: 9.8 FL (ref 6–12)
POTASSIUM SERPL-SCNC: 4.1 MMOL/L (ref 3.5–5.2)
PROT SERPL-MCNC: 6.8 G/DL (ref 6–8.5)
RBC # BLD AUTO: 4.21 10*6/MM3 (ref 3.77–5.28)
SODIUM SERPL-SCNC: 139 MMOL/L (ref 136–145)
TRIGL SERPL-MCNC: 99 MG/DL (ref 0–150)
VLDLC SERPL-MCNC: 18 MG/DL (ref 5–40)
WBC NRBC COR # BLD AUTO: 6.4 10*3/MM3 (ref 3.4–10.8)

## 2024-10-04 PROCEDURE — 90656 IIV3 VACC NO PRSV 0.5 ML IM: CPT | Performed by: FAMILY MEDICINE

## 2024-10-04 PROCEDURE — 99397 PER PM REEVAL EST PAT 65+ YR: CPT | Performed by: FAMILY MEDICINE

## 2024-10-04 PROCEDURE — 36415 COLL VENOUS BLD VENIPUNCTURE: CPT

## 2024-10-04 PROCEDURE — 90471 IMMUNIZATION ADMIN: CPT | Performed by: FAMILY MEDICINE

## 2024-10-04 PROCEDURE — 83036 HEMOGLOBIN GLYCOSYLATED A1C: CPT | Performed by: FAMILY MEDICINE

## 2024-10-04 PROCEDURE — 82306 VITAMIN D 25 HYDROXY: CPT | Performed by: FAMILY MEDICINE

## 2024-10-04 PROCEDURE — 80053 COMPREHEN METABOLIC PANEL: CPT | Performed by: FAMILY MEDICINE

## 2024-10-04 PROCEDURE — 80061 LIPID PANEL: CPT | Performed by: FAMILY MEDICINE

## 2024-10-04 PROCEDURE — 82274 ASSAY TEST FOR BLOOD FECAL: CPT | Performed by: FAMILY MEDICINE

## 2024-10-04 PROCEDURE — 85025 COMPLETE CBC W/AUTO DIFF WBC: CPT | Performed by: FAMILY MEDICINE

## 2024-10-04 NOTE — PROGRESS NOTES
Subjective   Sarah Brannon is a 64 y.o. female.     History of Present Illness  The patient presents for a physical.    She reports no unusual headaches or chest pain. However, she experienced an increase in shortness of breath a few weeks ago, which has since resolved. She has a history of anemia. Her bowel movements and urination are normal. She is emotionally stable and plans to receive her influenza vaccine today.  She has reported episodes of gasping in the night and daytime somnolence but had deferred a sleep study.  She is ready now       The following portions of the patient's history were reviewed and updated as appropriate: allergies, current medications, past family history, past medical history, past social history, past surgical history, and problem list.  Past Medical History:   Diagnosis Date    Arthritis     Baker's cyst of knee     RIGHT    Depression     Hiatal hernia     History of breast augmentation 04/07/2020    History of palpitations     Hyperlipemia     Hypertension     Migraine     PONV (postoperative nausea and vomiting)     SLOW TO WAKE    Seasonal allergies      Past Surgical History:   Procedure Laterality Date    BREAST AUGMENTATION Bilateral 06/2020    COLONOSCOPY Bilateral 01/06/2023    ENDOMETRIAL ABLATION      menustrual    FOOT SURGERY      rt and lt foot    LEEP      MOLE REMOVAL      NASAL SEPTUM SURGERY      REDUCTION MAMMAPLASTY      TOTAL KNEE ARTHROPLASTY Right 08/23/2016    Procedure: RT TOTAL KNEE ARTHROPLASTY WITH ROBERTO NAVIGATION, depuy;  Surgeon: Hakan Mccann MD;  Location: [facility];  Service:      Family History   Problem Relation Age of Onset    Heart disease Mother     Heart disease Father      Social History     Socioeconomic History    Marital status:    Tobacco Use    Smoking status: Never     Passive exposure: Never    Smokeless tobacco: Never   Vaping Use    Vaping status: Never Used   Substance and Sexual Activity    Alcohol use: No     "Drug use: No    Sexual activity: Defer         Current Outpatient Medications:     acyclovir (ZOVIRAX) 800 MG tablet, TAKE 1 TABLET BY MOUTH EVERY DAY, Disp: 90 tablet, Rfl: 0    amLODIPine (NORVASC) 5 MG tablet, Take 1 tablet by mouth Daily., Disp: 90 tablet, Rfl: 3    ASPIRIN 81 PO, Take  by mouth., Disp: , Rfl:     atorvastatin (LIPITOR) 40 MG tablet, TAKE 1 TABLET BY MOUTH AT NIGHT, Disp: 90 tablet, Rfl: 0    Brexpiprazole 1 MG tablet, Take 1 tablet by mouth Every Night., Disp: 30 tablet, Rfl: 2    buPROPion XL (WELLBUTRIN XL) 300 MG 24 hr tablet, TAKE 1 TABLET BY MOUTH IN THE MORNING, Disp: 30 tablet, Rfl: 2    cetirizine (zyrTEC) 10 MG tablet, Take 1 tablet by mouth Daily., Disp: 90 tablet, Rfl: 3    clobetasol (TEMOVATE) 0.05 % cream, Apply  topically to the appropriate area as directed 2 (Two) Times a Day., Disp: 30 g, Rfl: 1    losartan (Cozaar) 100 MG tablet, Take 1 tablet by mouth Daily., Disp: 90 tablet, Rfl: 3    metoprolol succinate XL (TOPROL-XL) 50 MG 24 hr tablet, Take 1 tablet by mouth Daily., Disp: 90 tablet, Rfl: 3    potassium chloride 10 MEQ CR tablet, TAKE 1 TABLET BY MOUTH EVERY DAY, Disp: 90 tablet, Rfl: 0    rivaroxaban (Xarelto) 20 MG tablet, TAKE 1 TABLET BY MOUTH EVERY DAY, Disp: 90 tablet, Rfl: 1    venlafaxine XR (EFFEXOR-XR) 75 MG 24 hr capsule, TAKE 3 CAPSULES BY MOUTH EVERY DAY, Disp: 90 capsule, Rfl: 2    Review of Systems  ROS done and noted in HPI    /78 (BP Location: Right arm, Patient Position: Sitting, Cuff Size: Large Adult)   Ht 154.9 cm (61\")   Wt 86.2 kg (190 lb)   BMI 35.90 kg/m²           Objective   Physical Exam  Vitals and nursing note reviewed. Exam conducted with a chaperone present.   Constitutional:       Appearance: Normal appearance. She is well-developed and well-groomed. She is obese.   HENT:      Head: Normocephalic and atraumatic.      Right Ear: Tympanic membrane, ear canal and external ear normal.      Left Ear: Tympanic membrane, ear canal and " external ear normal.      Nose: Nose normal.      Mouth/Throat:      Mouth: Mucous membranes are moist.      Pharynx: Oropharynx is clear.   Eyes:      Extraocular Movements: Extraocular movements intact.      Conjunctiva/sclera: Conjunctivae normal.      Pupils: Pupils are equal, round, and reactive to light.   Neck:      Thyroid: No thyromegaly.      Vascular: No carotid bruit.   Cardiovascular:      Rate and Rhythm: Normal rate and regular rhythm.      Pulses: Normal pulses.      Heart sounds: Normal heart sounds.   Pulmonary:      Effort: Pulmonary effort is normal.      Breath sounds: Normal breath sounds.   Abdominal:      General: Abdomen is flat. Bowel sounds are normal.      Palpations: Abdomen is soft. There is no hepatomegaly, splenomegaly or mass.      Tenderness: There is no abdominal tenderness.      Hernia: No hernia is present. There is no hernia in the left inguinal area or right inguinal area.   Genitourinary:     General: Normal vulva.      Exam position: Lithotomy position.      Labia:         Right: No rash, tenderness or lesion.         Left: No rash, tenderness or lesion.       Vagina: Normal.      Cervix: Normal.      Uterus: Normal.       Adnexa: Right adnexa normal and left adnexa normal.      Rectum: Normal. Guaiac result negative.      Comments: Atrophic changes  Musculoskeletal:      Cervical back: Normal range of motion and neck supple.      Right lower leg: No edema.      Left lower leg: No edema.   Lymphadenopathy:      Cervical: No cervical adenopathy.      Upper Body:      Right upper body: No supraclavicular adenopathy.      Left upper body: No supraclavicular adenopathy.      Lower Body: No right inguinal adenopathy. No left inguinal adenopathy.   Skin:     General: Skin is warm and dry.      Findings: No lesion or rash.   Neurological:      General: No focal deficit present.      Mental Status: She is alert.      Motor: Motor function is intact.      Deep Tendon Reflexes:  Reflexes are normal and symmetric.   Psychiatric:         Attention and Perception: Attention normal.         Mood and Affect: Mood normal.         Behavior: Behavior is cooperative.       Physical Exam         Results         Assessment & Plan   Problems Addressed this Visit          Endocrine and Metabolic    Vitamin D deficiency    Relevant Orders    Vitamin D,25-Hydroxy    Obesity (BMI 30-39.9)       Health Encounters    Encounter for general adult medical examination with abnormal findings - Primary    Relevant Orders    Comprehensive Metabolic Panel    Lipid Panel    Hemoglobin A1c    CBC & Differential     Other Visit Diagnoses       Screening for cervical cancer        Relevant Orders    IGP,Aptima HPV,Age Gdln    Colon cancer screening        Relevant Orders    POC FECAL OCCULT BLOOD BY IMMUNOASSAY (Completed)    Need for influenza vaccination        Relevant Orders    Fluzone >6mos (9528-9101) (Completed)    Daytime somnolence        Relevant Orders    Ambulatory Referral to Sleep Medicine    Gasping for breath        Relevant Orders    Ambulatory Referral to Sleep Medicine    Snoring        Relevant Orders    Ambulatory Referral to Sleep Medicine          Diagnoses         Codes Comments    Encounter for general adult medical examination with abnormal findings    -  Primary ICD-10-CM: Z00.01  ICD-9-CM: V70.0     Obesity (BMI 30-39.9)     ICD-10-CM: E66.9  ICD-9-CM: 278.00     Vitamin D deficiency     ICD-10-CM: E55.9  ICD-9-CM: 268.9     Screening for cervical cancer     ICD-10-CM: Z12.4  ICD-9-CM: V76.2     Colon cancer screening     ICD-10-CM: Z12.11  ICD-9-CM: V76.51     Need for influenza vaccination     ICD-10-CM: Z23  ICD-9-CM: V04.81     Daytime somnolence     ICD-10-CM: R40.0  ICD-9-CM: 780.54     Gasping for breath     ICD-10-CM: R06.89  ICD-9-CM: 786.09     Snoring     ICD-10-CM: R06.83  ICD-9-CM: 786.09           Assessment & Plan  1. Annual Physical.  Her weight has slightly decreased, and her  blood pressure is within the normal range. A Pap smear will be conducted today. Blood work has been ordered to check for anemia, given her history of anemia and recent shortness of breath. She was advised to continue her efforts towards weight management. CMP/LIPID/A1C/CBC/VITAMIN D ordered.  She deferred mammogram this year    2. Shortness of Breath.  She reported experiencing shortness of breath a couple of weeks ago, which has since resolved. The shortness of breath occurred during minimal activities such as walking through the house. The cardiologist had previously inquired about this symptom. Blood work has been ordered to check for anemia as a potential cause. With the H/O gasping at night, daytime somnolence, snoring, I will order a sleep study    3. Health Maintenance.  An influenza vaccine will be administered today. She plans to get additional vaccinations at [facility] on Saturday.    Follow-up  Return in 1 year for follow up.            Patient or patient representative verbalized consent for the use of Ambient Listening during the visit with  Patricia Steele MD for chart documentation. 10/4/2024  14:25 EDT

## 2024-10-06 RX ORDER — ERGOCALCIFEROL 1.25 MG/1
50000 CAPSULE, LIQUID FILLED ORAL WEEKLY
Qty: 12 CAPSULE | Refills: 3 | Status: SHIPPED | OUTPATIENT
Start: 2024-10-06

## 2024-10-07 NOTE — PROGRESS NOTES
Left message to return call to doctor's office at [facility]. Either to get test results or message from provider.

## 2024-10-08 ENCOUNTER — OFFICE VISIT (OUTPATIENT)
Dept: PSYCHIATRY | Facility: CLINIC | Age: 64
End: 2024-10-08
Payer: COMMERCIAL

## 2024-10-08 DIAGNOSIS — F33.9 RECURRENT MAJOR DEPRESSION RESISTANT TO TREATMENT: Primary | ICD-10-CM

## 2024-10-08 DIAGNOSIS — F41.1 GENERALIZED ANXIETY DISORDER: ICD-10-CM

## 2024-10-08 PROBLEM — F32.9 MAJOR DEPRESSIVE DISORDER, SINGLE EPISODE, UNSPECIFIED: Status: RESOLVED | Noted: 2023-09-19 | Resolved: 2024-10-08
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
## 2024-10-10 LAB
AGE GDLN ACOG TESTING: NORMAL
CYTOLOGIST CVX/VAG CYTO: NORMAL
CYTOLOGY CVX/VAG DOC CYTO: NORMAL
CYTOLOGY CVX/VAG DOC THIN PREP: NORMAL
DX ICD CODE: NORMAL
Lab: NORMAL
OTHER STN SPEC: NORMAL
STAT OF ADQ CVX/VAG CYTO-IMP: NORMAL

## 2024-10-10 RX ORDER — FLUCONAZOLE 150 MG/1
150 TABLET ORAL ONCE
Qty: 1 TABLET | Refills: 0 | Status: SHIPPED | OUTPATIENT
Start: 2024-10-11 | End: 2024-10-11

## 2024-10-10 NOTE — PROGRESS NOTES
I have reviewed the notes, assessments, and/or procedures performed by Tamara Bell, I concur with her/his documentation of Sarah Brannon.

## 2024-10-12 RX ORDER — CETIRIZINE HYDROCHLORIDE 10 MG/1
10 TABLET ORAL DAILY
Qty: 90 TABLET | Refills: 0 | Status: SHIPPED | OUTPATIENT
Start: 2024-10-12

## 2024-10-21 RX ORDER — METOPROLOL SUCCINATE 50 MG/1
50 TABLET, EXTENDED RELEASE ORAL DAILY
Qty: 90 TABLET | Refills: 0 | Status: SHIPPED | OUTPATIENT
Start: 2024-10-21

## 2024-12-03 DIAGNOSIS — F33.1 MAJOR DEPRESSIVE DISORDER, RECURRENT EPISODE, MODERATE: ICD-10-CM

## 2024-12-03 NOTE — TELEPHONE ENCOUNTER
Rx Refill Note  Requested Prescriptions     Pending Prescriptions Disp Refills    venlafaxine XR (EFFEXOR-XR) 75 MG 24 hr capsule [Pharmacy Med Name: Venlafaxine HCl ER Oral Capsule Extended Release 24 Hour 75 MG] 90 capsule 0     Sig: TAKE 3 CAPSULES BY MOUTH EVERY DAY      Last office visit with prescribing clinician: 10/8/2024     Next office visit with prescribing clinician: 1/2/2025     Office Visit with Tamara Bell, YOMAIRA, APRN (10/08/2024)     Med check - 1-      Harleen Carrasco MA  12/03/24, 16:07 EST

## 2024-12-05 RX ORDER — VENLAFAXINE HYDROCHLORIDE 75 MG/1
225 CAPSULE, EXTENDED RELEASE ORAL DAILY
Qty: 90 CAPSULE | Refills: 2 | Status: SHIPPED | OUTPATIENT
Start: 2024-12-05

## 2024-12-15 DIAGNOSIS — F33.9 RECURRENT MAJOR DEPRESSION RESISTANT TO TREATMENT: ICD-10-CM

## 2024-12-15 RX ORDER — POTASSIUM CHLORIDE 750 MG/1
10 TABLET, EXTENDED RELEASE ORAL DAILY
Qty: 90 TABLET | Refills: 0 | Status: SHIPPED | OUTPATIENT
Start: 2024-12-15

## 2024-12-16 RX ORDER — ATORVASTATIN CALCIUM 40 MG/1
40 TABLET, FILM COATED ORAL
Qty: 90 TABLET | Refills: 0 | Status: SHIPPED | OUTPATIENT
Start: 2024-12-16

## 2024-12-16 RX ORDER — BUPROPION HYDROCHLORIDE 300 MG/1
300 TABLET ORAL EVERY MORNING
Qty: 30 TABLET | Refills: 2 | Status: SHIPPED | OUTPATIENT
Start: 2024-12-16

## 2024-12-16 NOTE — TELEPHONE ENCOUNTER
Rx Refill Note  Requested Prescriptions     Pending Prescriptions Disp Refills    buPROPion XL (WELLBUTRIN XL) 300 MG 24 hr tablet [Pharmacy Med Name: buPROPion HCl ER (XL) Oral Tablet Extended Release 24 Hour 300 MG] 30 tablet 0     Sig: TAKE 1 TABLET BY MOUTH IN THE MORNING      Last office visit with prescribing clinician: 10/8/2024     Next office visit with prescribing clinician: 1/2/2025     Office Visit with Tamara Bell, YOMAIRA, APRN (10/08/2024)     Med check - 1-    Harleen Carrasco MA  12/16/24, 11:34 EST

## 2024-12-17 RX ORDER — ACYCLOVIR 800 MG/1
800 TABLET ORAL DAILY
Qty: 90 TABLET | Refills: 0 | Status: SHIPPED | OUTPATIENT
Start: 2024-12-17

## 2024-12-30 RX ORDER — RIVAROXABAN 20 MG/1
TABLET, FILM COATED ORAL
Qty: 90 TABLET | Refills: 1 | Status: SHIPPED | OUTPATIENT
Start: 2024-12-30

## 2024-12-30 NOTE — TELEPHONE ENCOUNTER
Rx Refill Note  Requested Prescriptions     Pending Prescriptions Disp Refills    Xarelto 20 MG tablet [Pharmacy Med Name: Xarelto Oral Tablet 20 MG] 90 tablet 0     Sig: TAKE 1 TABLET BY MOUTH EVERY DAY      Last office visit with prescribing clinician: 7/31/2024   Last telemedicine visit with prescribing clinician: Visit date not found   Next office visit with prescribing clinician: 2/4/2025                         Would you like a call back once the refill request has been completed: [] Yes [] No    If the office needs to give you a call back, can they leave a voicemail: [] Yes [] No    Cuca Franks MA  12/30/24, 09:43 EST

## 2025-01-03 ENCOUNTER — OFFICE VISIT (OUTPATIENT)
Dept: PSYCHIATRY | Facility: CLINIC | Age: 65
End: 2025-01-03
Payer: MEDICARE

## 2025-01-03 VITALS
OXYGEN SATURATION: 97 % | DIASTOLIC BLOOD PRESSURE: 78 MMHG | HEART RATE: 49 BPM | SYSTOLIC BLOOD PRESSURE: 145 MMHG | BODY MASS INDEX: 37.22 KG/M2 | WEIGHT: 197 LBS

## 2025-01-03 DIAGNOSIS — F41.1 GENERALIZED ANXIETY DISORDER: ICD-10-CM

## 2025-01-03 DIAGNOSIS — F33.9 RECURRENT MAJOR DEPRESSION RESISTANT TO TREATMENT: Primary | ICD-10-CM
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
## 2025-01-14 RX ORDER — METOPROLOL SUCCINATE 50 MG/1
50 TABLET, EXTENDED RELEASE ORAL DAILY
Qty: 90 TABLET | Refills: 0 | Status: SHIPPED | OUTPATIENT
Start: 2025-01-14

## 2025-01-14 RX ORDER — CETIRIZINE HYDROCHLORIDE 10 MG/1
10 TABLET ORAL DAILY
Qty: 90 TABLET | Refills: 0 | Status: SHIPPED | OUTPATIENT
Start: 2025-01-14

## 2025-01-14 NOTE — TELEPHONE ENCOUNTER
Caller: ANKIT SURESH    Relationship: Emergency Contact    Best call back number:   Telephone Information:   Mobile [phone]         Requested Prescriptions:   Requested Prescriptions     Pending Prescriptions Disp Refills    metoprolol succinate XL (TOPROL-XL) 50 MG 24 hr tablet 90 tablet 0     Sig: Take 1 tablet by mouth Daily.    cetirizine (zyrTEC) 10 MG tablet 90 tablet 0     Sig: Take 1 tablet by mouth Daily.        Pharmacy where request should be sent: [facility] [id number] [place] [address] [address]     Last office visit with prescribing clinician: 10/4/2024   Last telemedicine visit with prescribing clinician: Visit date not found   Next office visit with prescribing clinician: 10/21/2025         Does the patient have less than a 3 day supply:  [] Yes  [x] No    Would you like a call back once the refill request has been completed: [] Yes [] No    If the office needs to give you a call back, can they leave a voicemail: [] Yes [] No    Marco Moore Rep   01/14/25 14:41 EST

## 2025-01-28 DIAGNOSIS — F33.9 RECURRENT MAJOR DEPRESSION RESISTANT TO TREATMENT: ICD-10-CM

## 2025-01-28 RX ORDER — POTASSIUM CHLORIDE 750 MG/1
10 TABLET, EXTENDED RELEASE ORAL DAILY
Qty: 90 TABLET | Refills: 1 | Status: SHIPPED | OUTPATIENT
Start: 2025-01-28

## 2025-01-28 RX ORDER — BUPROPION HYDROCHLORIDE 300 MG/1
300 TABLET ORAL EVERY MORNING
Qty: 90 TABLET | Refills: 1 | Status: SHIPPED | OUTPATIENT
Start: 2025-01-28

## 2025-01-28 NOTE — TELEPHONE ENCOUNTER
Caller: ANKIT SURESH    Relationship: Emergency Contact    Best call back number:   Telephone Information:   Mobile [phone]         Requested Prescriptions:   Requested Prescriptions     Pending Prescriptions Disp Refills    buPROPion XL (WELLBUTRIN XL) 300 MG 24 hr tablet 30 tablet 2     Sig: Take 1 tablet by mouth Every Morning.    potassium chloride 10 MEQ CR tablet 90 tablet 0     Sig: Take 1 tablet by mouth Daily.        Pharmacy where request should be sent: [facility] [id number] [place] [phone] [address]     Last office visit with prescribing clinician: 10/4/2024   Last telemedicine visit with prescribing clinician: Visit date not found   Next office visit with prescribing clinician: 10/21/2025     Additional details provided by patient:     Does the patient have less than a 3 day supply:  [x] Yes  [] No    Would you like a call back once the refill request has been completed: [] Yes [] No    If the office needs to give you a call back, can they leave a voicemail: [] Yes [] No    Marco Moore Rep   01/28/25 14:14 EST

## 2025-02-04 ENCOUNTER — OFFICE VISIT (OUTPATIENT)
Dept: CARDIOLOGY | Facility: CLINIC | Age: 65
End: 2025-02-04
Payer: MEDICARE

## 2025-02-04 VITALS
SYSTOLIC BLOOD PRESSURE: 124 MMHG | BODY MASS INDEX: 37.19 KG/M2 | DIASTOLIC BLOOD PRESSURE: 82 MMHG | WEIGHT: 197 LBS | HEIGHT: 61 IN | HEART RATE: 67 BPM | OXYGEN SATURATION: 99 %

## 2025-02-04 DIAGNOSIS — I10 PRIMARY HYPERTENSION: ICD-10-CM

## 2025-02-04 DIAGNOSIS — E78.2 MIXED HYPERLIPIDEMIA: ICD-10-CM

## 2025-02-04 DIAGNOSIS — I48.0 PAROXYSMAL ATRIAL FIBRILLATION: Primary | ICD-10-CM

## 2025-02-04 PROCEDURE — 3074F SYST BP LT 130 MM HG: CPT | Performed by: INTERNAL MEDICINE

## 2025-02-04 PROCEDURE — 1159F MED LIST DOCD IN RCRD: CPT | Performed by: INTERNAL MEDICINE

## 2025-02-04 PROCEDURE — 3079F DIAST BP 80-89 MM HG: CPT | Performed by: INTERNAL MEDICINE

## 2025-02-04 PROCEDURE — 99214 OFFICE O/P EST MOD 30 MIN: CPT | Performed by: INTERNAL MEDICINE

## 2025-02-04 PROCEDURE — 1160F RVW MEDS BY RX/DR IN RCRD: CPT | Performed by: INTERNAL MEDICINE

## 2025-02-04 NOTE — PROGRESS NOTES
"    Subjective:     Encounter Date:02/04/2025      Patient ID: Sarah Brannon is a 65 y.o. female.    Chief Complaint:  Atrial Fibrillation  Symptoms include palpitations. Symptoms are negative for chest pain, dizziness and shortness of breath. Past medical history includes atrial fibrillation.   65-year-old white female with history of paroxysmal fibrillation hypertension hyperlipidemia and TIA in the past presents to my office for a follow-up.  Patient is currently stable without any symptoms of chest pain or shortness of breath at rest on exertion.  No complaint of any PND orthopnea.  She has occasional palpitation without any dizziness syncope or swelling of the feet.  She is taking her medicines regularly.  She does not smoke.    The following portions of the patient's history were reviewed and updated as appropriate: allergies, current medications, past family history, past medical history, past social history, past surgical history, and problem list.  Past Medical History:   Diagnosis Date    Arthritis     Baker's cyst of knee     RIGHT    Depression     Hiatal hernia     History of breast augmentation 04/07/2020    History of palpitations     Hyperlipemia     Hypertension     Migraine     PONV (postoperative nausea and vomiting)     SLOW TO WAKE    Seasonal allergies      Past Surgical History:   Procedure Laterality Date    BREAST AUGMENTATION Bilateral 06/2020    COLONOSCOPY Bilateral 01/06/2023    ENDOMETRIAL ABLATION      menustrual    FOOT SURGERY      rt and lt foot    LEEP      MOLE REMOVAL      NASAL SEPTUM SURGERY      REDUCTION MAMMAPLASTY      TOTAL KNEE ARTHROPLASTY Right 08/23/2016    Procedure: RT TOTAL KNEE ARTHROPLASTY WITH ROBERTO NAVIGATION, [place];  Surgeon: Hakan Mccann MD;  Location: [facility];  Service:      /82   Pulse 67   Ht 154.9 cm (60.98\")   Wt 89.4 kg (197 lb)   SpO2 99%   BMI 37.24 kg/m²   Family History   Problem Relation Age of Onset    Heart disease " Mother     Heart disease Father        Current Outpatient Medications:     acyclovir (ZOVIRAX) 800 MG tablet, TAKE 1 TABLET BY MOUTH EVERY DAY, Disp: 90 tablet, Rfl: 0    amLODIPine (NORVASC) 5 MG tablet, Take 1 tablet by mouth Daily., Disp: 90 tablet, Rfl: 3    ASPIRIN 81 PO, Take  by mouth., Disp: , Rfl:     atorvastatin (LIPITOR) 40 MG tablet, TAKE 1 TABLET BY MOUTH AT NIGHT, Disp: 90 tablet, Rfl: 0    buPROPion XL (WELLBUTRIN XL) 300 MG 24 hr tablet, Take 1 tablet by mouth Every Morning., Disp: 90 tablet, Rfl: 1    cetirizine (zyrTEC) 10 MG tablet, Take 1 tablet by mouth Daily., Disp: 90 tablet, Rfl: 0    clobetasol (TEMOVATE) 0.05 % cream, Apply  topically to the appropriate area as directed 2 (Two) Times a Day., Disp: 30 g, Rfl: 1    losartan (Cozaar) 100 MG tablet, Take 1 tablet by mouth Daily., Disp: 90 tablet, Rfl: 3    metoprolol succinate XL (TOPROL-XL) 50 MG 24 hr tablet, Take 1 tablet by mouth Daily., Disp: 90 tablet, Rfl: 0    potassium chloride 10 MEQ CR tablet, Take 1 tablet by mouth Daily., Disp: 90 tablet, Rfl: 1    rivaroxaban (Xarelto) 20 MG tablet, TAKE 1 TABLET BY MOUTH EVERY DAY, Disp: 90 tablet, Rfl: 1    venlafaxine XR (EFFEXOR-XR) 75 MG 24 hr capsule, TAKE 3 CAPSULES BY MOUTH EVERY DAY, Disp: 90 capsule, Rfl: 2    vitamin D (ERGOCALCIFEROL) 1.25 MG (79624 UT) capsule capsule, Take 1 capsule by mouth 1 (One) Time Per Week., Disp: 12 capsule, Rfl: 3  Allergies   Allergen Reactions    Bee Venom Swelling    Niacin Other (See Comments)     Pt states her face turned red    Shellfish-Derived Products Swelling     Lips became swollen.     Social History     Socioeconomic History    Marital status:    Tobacco Use    Smoking status: Never     Passive exposure: Never    Smokeless tobacco: Never   Vaping Use    Vaping status: Never Used   Substance and Sexual Activity    Alcohol use: No    Drug use: No    Sexual activity: Defer     Review of Systems   Constitutional: Negative for  malaise/fatigue.   Cardiovascular:  Positive for palpitations. Negative for chest pain, dyspnea on exertion and leg swelling.   Respiratory:  Negative for cough and shortness of breath.    Gastrointestinal:  Negative for abdominal pain, nausea and vomiting.   Neurological:  Negative for dizziness, focal weakness, headaches, light-headedness and numbness.   All other systems reviewed and are negative.             Objective:     Constitutional:       Appearance: Well-developed.   Eyes:      General: No scleral icterus.     Conjunctiva/sclera: Conjunctivae normal.   HENT:      Head: Normocephalic and atraumatic.   Neck:      Vascular: No carotid bruit or JVD.   Pulmonary:      Effort: Pulmonary effort is normal.      Breath sounds: Normal breath sounds. No wheezing. No rales.   Cardiovascular:      Normal rate. Regular rhythm.   Pulses:     Intact distal pulses.   Abdominal:      General: Bowel sounds are normal.      Palpations: Abdomen is soft.   Musculoskeletal:      Cervical back: Normal range of motion and neck supple. Skin:     General: Skin is warm and dry.      Findings: No rash.   Neurological:      Mental Status: Alert.       Procedures    Lab Review:         MDM    #1 atrial fibrillation  Patient has paroxysmal fibrillation is currently on metoprolol and Xarelto for anticoagulation but has occasional palpitations  2.  Hypertension  Patient blood pressure currently stable on amlodipine losartan metoprolol  3.  Hyperlipidemia  Patient on atorvastatin and the lipid levels are well within normal limits  4.  TIA  Patient has been on anticoagulation with Xarelto and also on aspirin since her last TIA and is doing well.    Patient's previous medical records, labs, and EKG were reviewed and discussed with the patient at today's visit.

## 2025-03-14 ENCOUNTER — OFFICE VISIT (OUTPATIENT)
Dept: FAMILY MEDICINE CLINIC | Facility: CLINIC | Age: 65
End: 2025-03-14
Payer: MEDICARE

## 2025-03-14 VITALS
BODY MASS INDEX: 37.99 KG/M2 | SYSTOLIC BLOOD PRESSURE: 131 MMHG | TEMPERATURE: 97.8 F | HEIGHT: 61 IN | HEART RATE: 56 BPM | DIASTOLIC BLOOD PRESSURE: 79 MMHG | WEIGHT: 201.2 LBS | OXYGEN SATURATION: 97 %

## 2025-03-14 DIAGNOSIS — R42 VERTIGO: ICD-10-CM

## 2025-03-14 DIAGNOSIS — J01.90 ACUTE SINUSITIS, RECURRENCE NOT SPECIFIED, UNSPECIFIED LOCATION: Primary | ICD-10-CM

## 2025-03-14 PROCEDURE — 3078F DIAST BP <80 MM HG: CPT | Performed by: NURSE PRACTITIONER

## 2025-03-14 PROCEDURE — 3075F SYST BP GE 130 - 139MM HG: CPT | Performed by: NURSE PRACTITIONER

## 2025-03-14 PROCEDURE — 99213 OFFICE O/P EST LOW 20 MIN: CPT | Performed by: NURSE PRACTITIONER

## 2025-03-14 PROCEDURE — 1126F AMNT PAIN NOTED NONE PRSNT: CPT | Performed by: NURSE PRACTITIONER

## 2025-03-14 RX ORDER — MECLIZINE HYDROCHLORIDE 25 MG/1
25 TABLET ORAL 3 TIMES DAILY PRN
Qty: 90 TABLET | Refills: 0 | Status: SHIPPED | OUTPATIENT
Start: 2025-03-14

## 2025-03-14 RX ORDER — LORATADINE 10 MG
1 CAPSULE ORAL EVERY 6 HOURS PRN
Qty: 56 EACH | Refills: 0 | Status: SHIPPED | OUTPATIENT
Start: 2025-03-14 | End: 2025-03-28

## 2025-03-14 NOTE — PROGRESS NOTES
Chief Complaint  Dizziness, Sinus Problem (Sinus infection for a few weeks but hasn't gotten better ), and Earache (Due to drainage but has gotten better )    Subjective        Sarah Brannon presents to [facility]  History of Present Illness  Jayla is a 65-year-old female presenting today with complaints of sinus congestion and vertigo. She has been dealing with sinus issues for about a month. She has a cough, post nasal drainage and dry mouth. Sputum is yellow. She feels like she is improving overall. She is doing nasal lavages and taking flonase/allergy meds daily. She is also experiencing vertigo. She says it feels like the room is spinning.        The following portions of the patient's history were reviewed and updated as appropriate: allergies, current medications, past family history, past medical history, past social history, past surgical history and problem list.    Allergies   Allergen Reactions    Bee Venom Swelling    Niacin Other (See Comments)     Pt states her face turned red    Shellfish-Derived Products Swelling     Lips became swollen.       Patient Active Problem List   Diagnosis    Primary osteoarthritis of right knee    Status post right knee replacement    Hx of total knee arthroplasty    History of total knee arthroplasty    Allergic rhinitis    Anemia    Breast hypertrophy    Bunion    Edema    Encounter for general adult medical examination with abnormal findings    Gout    Hyperlipidemia    Primary hypertension    Macromastia    Mastodynia    Migraine headache    Obesity (BMI 30-39.9)    Presbyopia    Vitamin D deficiency    Intrinsic eczema    TIA (transient ischemic attack)    Hypokalemia    Sinusitis    History of stroke    Migraine aura without headache    Hyperglycemia    Acquired hallux rigidus of right foot    Acquired hallux varus of left foot    Arthritis of left foot    Back pain    Bilateral foot pain    Cerebral infarction, unspecified     "Constipation    Diverticulosis of large intestine without perforation or abscess without bleeding    Hiatal hernia    Irritable bowel syndrome with mixed bowel habits    Lower abdominal pain, unspecified    Other abnormalities of heart beat    Presence of retained hardware    Psoriasis    Pure hypercholesterolemia    Polyp of colon    Seasonal allergies    Unspecified sexually transmitted disease    Hammer toe of left foot       Current Outpatient Medications   Medication Instructions    acyclovir (ZOVIRAX) 800 mg, Oral, Daily    amLODIPine (NORVASC) 5 mg, Oral, Daily    ASPIRIN 81 PO Take  by mouth.    atorvastatin (LIPITOR) 40 mg, Oral, Every Night at Bedtime    buPROPion XL (WELLBUTRIN XL) 300 mg, Oral, Every Morning    cetirizine (ZYRTEC) 10 mg, Oral, Daily    clobetasol (TEMOVATE) 0.05 % cream Topical, 2 Times Daily    Dextromethorphan-guaiFENesin (Coricidin HBP Congestion/Cough)  MG capsule 1 tablet, Oral, Every 6 Hours PRN    losartan (COZAAR) 100 mg, Oral, Daily    meclizine (ANTIVERT) 25 mg, Oral, 3 Times Daily PRN    metoprolol succinate XL (TOPROL-XL) 50 mg, Oral, Daily    potassium chloride 10 MEQ CR tablet 10 mEq, Oral, Daily    rivaroxaban (Xarelto) 20 MG tablet TAKE 1 TABLET BY MOUTH EVERY DAY    venlafaxine XR (EFFEXOR-XR) 225 mg, Oral, Daily    vitamin D (ERGOCALCIFEROL) 50,000 Units, Oral, Weekly          Objective   Vital Signs:  /79 (BP Location: Left arm, Patient Position: Sitting, Cuff Size: Large Adult)   Pulse 56   Temp 97.8 °F (36.6 °C) (Temporal)   Ht 154.9 cm (60.98\")   Wt 91.3 kg (201 lb 3.2 oz)   SpO2 97%   BMI 38.04 kg/m²   Estimated body mass index is 38.04 kg/m² as calculated from the following:    Height as of this encounter: 154.9 cm (60.98\").    Weight as of this encounter: 91.3 kg (201 lb 3.2 oz).             Review of Systems   Constitutional:  Negative for appetite change, chills, fatigue and fever.   HENT:  Positive for congestion and postnasal drip. Negative " for ear pain, rhinorrhea, sinus pressure, sinus pain, sneezing, sore throat and tinnitus.    Eyes:  Negative for redness.   Respiratory:  Positive for cough. Negative for chest tightness, shortness of breath and wheezing.    Cardiovascular:  Negative for chest pain.   Gastrointestinal:  Negative for nausea and vomiting.   Musculoskeletal:  Negative for myalgias.   Allergic/Immunologic: Negative for environmental allergies.   Neurological:  Positive for dizziness. Negative for syncope, weakness, light-headedness and headaches.        Physical Exam  Constitutional:       Appearance: Normal appearance.   HENT:      Head: Normocephalic and atraumatic.      Right Ear: Tympanic membrane, ear canal and external ear normal. A middle ear effusion is present.      Left Ear: Tympanic membrane, ear canal and external ear normal. A middle ear effusion is present.      Nose: Nose normal. Congestion present.      Mouth/Throat:      Mouth: Mucous membranes are moist.      Pharynx: Oropharynx is clear.   Eyes:      Extraocular Movements: Extraocular movements intact.      Conjunctiva/sclera: Conjunctivae normal.      Pupils: Pupils are equal, round, and reactive to light.   Cardiovascular:      Rate and Rhythm: Normal rate and regular rhythm.   Pulmonary:      Effort: Pulmonary effort is normal.      Breath sounds: Normal breath sounds.   Musculoskeletal:      Cervical back: Normal range of motion and neck supple.   Skin:     General: Skin is warm and dry.   Neurological:      Mental Status: She is alert and oriented to person, place, and time.   Psychiatric:         Mood and Affect: Mood normal.         Behavior: Behavior normal.         Thought Content: Thought content normal.         Judgment: Judgment normal.        Result Review :                   Assessment and Plan   Diagnoses and all orders for this visit:    1. Acute sinusitis, recurrence not specified, unspecified location (Primary)  Comments:  start coricidin hbp  cont  flonase and nasal lavage  rto if symptoms persist.    2. Vertigo  Comments:  Start meclizine.  Gave patient handout on Epley maneuver.  May consider vestibular PT.    Other orders  -     meclizine (ANTIVERT) 25 MG tablet; Take 1 tablet by mouth 3 (Three) Times a Day As Needed for Dizziness.  Dispense: 90 tablet; Refill: 0  -     Dextromethorphan-guaiFENesin (Coricidin HBP Congestion/Cough)  MG capsule; Take 1 tablet by mouth Every 6 (Six) Hours As Needed (congestion) for up to 14 days.  Dispense: 56 each; Refill: 0             Follow Up   No follow-ups on file.  Patient was given instructions and counseling regarding her condition or for health maintenance advice. Please see specific information pulled into the AVS if appropriate.

## 2025-03-19 RX ORDER — ACYCLOVIR 800 MG/1
800 TABLET ORAL DAILY
Qty: 90 TABLET | Refills: 0 | Status: SHIPPED | OUTPATIENT
Start: 2025-03-19

## 2025-03-19 NOTE — TELEPHONE ENCOUNTER
Caller: ANKIT SURESH    Relationship: Emergency Contact    Best call back number: [phone]    Requested Prescriptions:   Requested Prescriptions     Pending Prescriptions Disp Refills    acyclovir (ZOVIRAX) 800 MG tablet 90 tablet 0     Sig: Take 1 tablet by mouth Daily.      Pharmacy where request should be sent: [facility] [address] [place] [address] [phone] [facility] [phone] FX     Last office visit with prescribing clinician: 10/4/2024   Last telemedicine visit with prescribing clinician: Visit date not found   Next office visit with prescribing clinician: 10/21/2025     Does the patient have less than a 3 day supply:  [] Yes  [x] No    Would you like a call back once the refill request has been completed: [] Yes [x] No    Marco Suh Rep   03/19/25 14:32 EDT

## 2025-03-20 ENCOUNTER — TELEPHONE (OUTPATIENT)
Dept: CARDIOLOGY | Facility: CLINIC | Age: 65
End: 2025-03-20
Payer: COMMERCIAL

## 2025-03-20 RX ORDER — AMLODIPINE BESYLATE 5 MG/1
5 TABLET ORAL DAILY
Qty: 90 TABLET | Refills: 3 | Status: SHIPPED | OUTPATIENT
Start: 2025-03-20

## 2025-03-20 NOTE — TELEPHONE ENCOUNTER
Incoming Refill Request      Medication requested (name and dose): Amlodipine 5 mg    Pharmacy where request should be sent: [facility] [id number]     Additional details provided by patient:     Best call back number: [phone]    Does the patient have less than a 3 day supply:  [] Yes  [x] No    Marco Tejada Rep  03/20/25, 12:30 EDT

## 2025-03-20 NOTE — TELEPHONE ENCOUNTER
Rx Refill Note  Requested Prescriptions     Signed Prescriptions Disp Refills    amLODIPine (NORVASC) 5 MG tablet 90 tablet 3     Sig: Take 1 tablet by mouth Daily.     Authorizing Provider: ENE GARZON     Ordering User: ALESSIO OATES      Last office visit with prescribing clinician: 2/4/2025   Last telemedicine visit with prescribing clinician: Visit date not found   Next office visit with prescribing clinician: 8/5/2025                         Would you like a call back once the refill request has been completed: [] Yes [] No    If the office needs to give you a call back, can they leave a voicemail: [] Yes [] No    Alessio Oates MA  03/20/25, 12:33 EDT

## 2025-03-21 RX ORDER — ATORVASTATIN CALCIUM 40 MG/1
40 TABLET, FILM COATED ORAL
Qty: 90 TABLET | Refills: 0 | Status: SHIPPED | OUTPATIENT
Start: 2025-03-21

## 2025-03-21 NOTE — TELEPHONE ENCOUNTER
Caller: ANKIT SURESH    Relationship: Emergency Contact    Best call back number: [phone]    Requested Prescriptions:   Requested Prescriptions     Pending Prescriptions Disp Refills    atorvastatin (LIPITOR) 40 MG tablet 90 tablet 0     Sig: Take 1 tablet by mouth every night at bedtime.        Pharmacy where request should be sent: [facility] [address] [place], [place] [phone] [place] [phone]      Last office visit with prescribing clinician: 10/4/2024   Last telemedicine visit with prescribing clinician: Visit date not found   Next office visit with prescribing clinician: 10/21/2025     Additional details provided by patient:     Does the patient have less than a 3 day supply:  [] Yes  [x] No    Would you like a call back once the refill request has been completed: [] Yes [] No    If the office needs to give you a call back, can they leave a voicemail: [] Yes [] No    Marco Crespo Rep   03/21/25 09:00 EDT

## 2025-03-29 DIAGNOSIS — F33.1 MAJOR DEPRESSIVE DISORDER, RECURRENT EPISODE, MODERATE: ICD-10-CM

## 2025-03-31 RX ORDER — VENLAFAXINE HYDROCHLORIDE 75 MG/1
225 CAPSULE, EXTENDED RELEASE ORAL DAILY
Qty: 90 CAPSULE | Refills: 0 | Status: SHIPPED | OUTPATIENT
Start: 2025-03-31

## 2025-04-07 RX ORDER — RIVAROXABAN 20 MG/1
20 TABLET, FILM COATED ORAL DAILY
Qty: 90 TABLET | Refills: 0 | Status: SHIPPED | OUTPATIENT
Start: 2025-04-07

## 2025-04-07 NOTE — TELEPHONE ENCOUNTER
Rx Refill Note  Requested Prescriptions     Pending Prescriptions Disp Refills    Xarelto 20 MG tablet [Pharmacy Med Name: Xarelto 20 MG Oral Tablet] 90 tablet 0     Sig: Take 1 tablet by mouth once daily      Last office visit with prescribing clinician: 2/4/2025   Last telemedicine visit with prescribing clinician: Visit date not found   Next office visit with prescribing clinician: 8/5/2025                         Would you like a call back once the refill request has been completed: [] Yes [] No    If the office needs to give you a call back, can they leave a voicemail: [] Yes [] No    Edgardo Metcalf MA  04/07/25, 08:41 EDT

## 2025-04-09 RX ORDER — METOPROLOL SUCCINATE 50 MG/1
50 TABLET, EXTENDED RELEASE ORAL DAILY
Qty: 90 TABLET | Refills: 0 | Status: SHIPPED | OUTPATIENT
Start: 2025-04-09

## 2025-04-09 RX ORDER — CETIRIZINE HYDROCHLORIDE 10 MG/1
10 TABLET, FILM COATED ORAL DAILY
Qty: 90 TABLET | Refills: 0 | Status: SHIPPED | OUTPATIENT
Start: 2025-04-09

## 2025-04-27 DIAGNOSIS — F33.1 MAJOR DEPRESSIVE DISORDER, RECURRENT EPISODE, MODERATE: ICD-10-CM

## 2025-04-28 RX ORDER — VENLAFAXINE HYDROCHLORIDE 75 MG/1
225 CAPSULE, EXTENDED RELEASE ORAL DAILY
Qty: 90 CAPSULE | Refills: 2 | Status: SHIPPED | OUTPATIENT
Start: 2025-04-28

## 2025-04-28 NOTE — TELEPHONE ENCOUNTER
Rx Refill Note  Requested Prescriptions     Pending Prescriptions Disp Refills    venlafaxine XR (EFFEXOR-XR) 75 MG 24 hr capsule [Pharmacy Med Name: Venlafaxine HCl ER 75 MG Oral Capsule Extended Release 24 Hour] 90 capsule 0     Sig: TAKE 3 CAPSULES BY MOUTH ONCE DAILY      Last office visit with prescribing clinician: 1/3/2025     Next office visit with prescribing clinician: 7/3/2025     Office Visit with Tamara Bell, YOMAIRA, APRN (01/03/2025)     Med check - 7-    Harleen Carrasco MA  04/28/25, 09:34 EDT

## 2025-06-10 DIAGNOSIS — I10 PRIMARY HYPERTENSION: ICD-10-CM

## 2025-06-10 RX ORDER — LOSARTAN POTASSIUM 100 MG/1
100 TABLET ORAL DAILY
Qty: 90 TABLET | Refills: 0 | Status: SHIPPED | OUTPATIENT
Start: 2025-06-10

## 2025-06-22 RX ORDER — ATORVASTATIN CALCIUM 40 MG/1
40 TABLET, FILM COATED ORAL
Qty: 90 TABLET | Refills: 0 | Status: SHIPPED | OUTPATIENT
Start: 2025-06-22

## 2025-07-03 ENCOUNTER — OFFICE VISIT (OUTPATIENT)
Dept: PSYCHIATRY | Facility: CLINIC | Age: 65
End: 2025-07-03
Payer: MEDICARE

## 2025-07-03 VITALS
DIASTOLIC BLOOD PRESSURE: 81 MMHG | WEIGHT: 204.6 LBS | HEART RATE: 52 BPM | OXYGEN SATURATION: 97 % | BODY MASS INDEX: 38.68 KG/M2 | SYSTOLIC BLOOD PRESSURE: 146 MMHG

## 2025-07-03 DIAGNOSIS — F41.1 GENERALIZED ANXIETY DISORDER: Chronic | ICD-10-CM

## 2025-07-03 DIAGNOSIS — F33.9 RECURRENT MAJOR DEPRESSION RESISTANT TO TREATMENT: Primary | Chronic | ICD-10-CM

## 2025-07-03 RX ORDER — BUPROPION HYDROCHLORIDE 300 MG/1
300 TABLET ORAL EVERY MORNING
Qty: 90 TABLET | Refills: 1 | Status: SHIPPED | OUTPATIENT
Start: 2025-07-03
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
## 2025-07-14 RX ORDER — CETIRIZINE HYDROCHLORIDE 10 MG/1
10 TABLET, FILM COATED ORAL DAILY
Qty: 90 TABLET | Refills: 0 | Status: SHIPPED | OUTPATIENT
Start: 2025-07-14 | End: 2025-07-15

## 2025-07-15 RX ORDER — ACYCLOVIR 800 MG/1
800 TABLET ORAL DAILY
Qty: 90 TABLET | Refills: 0 | Status: SHIPPED | OUTPATIENT
Start: 2025-07-15

## 2025-07-15 RX ORDER — CETIRIZINE HYDROCHLORIDE 10 MG/1
10 TABLET, FILM COATED ORAL DAILY
Qty: 90 TABLET | Refills: 0 | Status: SHIPPED | OUTPATIENT
Start: 2025-07-15

## 2025-07-15 RX ORDER — METOPROLOL SUCCINATE 50 MG/1
50 TABLET, EXTENDED RELEASE ORAL DAILY
Qty: 90 TABLET | Refills: 0 | Status: SHIPPED | OUTPATIENT
Start: 2025-07-15

## 2025-07-22 DIAGNOSIS — F33.1 MAJOR DEPRESSIVE DISORDER, RECURRENT EPISODE, MODERATE: ICD-10-CM

## 2025-07-22 NOTE — TELEPHONE ENCOUNTER
Rx Refill Note  Requested Prescriptions     Pending Prescriptions Disp Refills    venlafaxine XR (EFFEXOR-XR) 75 MG 24 hr capsule [Pharmacy Med Name: Venlafaxine HCl ER 75 MG Oral Capsule Extended Release 24 Hour] 90 capsule 0     Sig: TAKE 3 CAPSULES BY MOUTH ONCE DAILY        Last office visit with prescribing clinician: 7/3/2025     Next office visit with prescribing clinician: 1/6/2026     Office Visit with Tamara Bell, YOMAIRA, APRN (07/03/2025)     Anali Espitia MA  07/22/25, 09:58 EDT

## 2025-07-23 RX ORDER — VENLAFAXINE HYDROCHLORIDE 75 MG/1
225 CAPSULE, EXTENDED RELEASE ORAL DAILY
Qty: 90 CAPSULE | Refills: 2 | Status: SHIPPED | OUTPATIENT
Start: 2025-07-23

## 2025-08-05 ENCOUNTER — OFFICE VISIT (OUTPATIENT)
Dept: CARDIOLOGY | Facility: CLINIC | Age: 65
End: 2025-08-05
Payer: MEDICARE

## 2025-08-05 VITALS
WEIGHT: 205 LBS | BODY MASS INDEX: 38.71 KG/M2 | OXYGEN SATURATION: 99 % | HEART RATE: 61 BPM | DIASTOLIC BLOOD PRESSURE: 83 MMHG | SYSTOLIC BLOOD PRESSURE: 136 MMHG | HEIGHT: 61 IN

## 2025-08-05 DIAGNOSIS — I10 PRIMARY HYPERTENSION: ICD-10-CM

## 2025-08-05 DIAGNOSIS — I48.0 PAROXYSMAL ATRIAL FIBRILLATION: Primary | ICD-10-CM

## 2025-08-05 DIAGNOSIS — E78.2 MIXED HYPERLIPIDEMIA: ICD-10-CM

## 2025-08-05 PROCEDURE — 99214 OFFICE O/P EST MOD 30 MIN: CPT | Performed by: INTERNAL MEDICINE

## 2025-08-05 PROCEDURE — 3075F SYST BP GE 130 - 139MM HG: CPT | Performed by: INTERNAL MEDICINE

## 2025-08-05 PROCEDURE — 1160F RVW MEDS BY RX/DR IN RCRD: CPT | Performed by: INTERNAL MEDICINE

## 2025-08-05 PROCEDURE — 3079F DIAST BP 80-89 MM HG: CPT | Performed by: INTERNAL MEDICINE

## 2025-08-05 PROCEDURE — 1159F MED LIST DOCD IN RCRD: CPT | Performed by: INTERNAL MEDICINE

## 2025-08-14 ENCOUNTER — TELEPHONE (OUTPATIENT)
Dept: FAMILY MEDICINE CLINIC | Facility: CLINIC | Age: 65
End: 2025-08-14
Payer: COMMERCIAL